# Patient Record
Sex: FEMALE | Race: WHITE | NOT HISPANIC OR LATINO | Employment: OTHER | URBAN - METROPOLITAN AREA
[De-identification: names, ages, dates, MRNs, and addresses within clinical notes are randomized per-mention and may not be internally consistent; named-entity substitution may affect disease eponyms.]

---

## 2017-01-23 DIAGNOSIS — Z12.11 ENCOUNTER FOR SCREENING FOR MALIGNANT NEOPLASM OF COLON: Primary | ICD-10-CM

## 2017-01-23 RX ORDER — SODIUM CHLORIDE 0.9 % (FLUSH) 0.9 %
1-10 SYRINGE (ML) INJECTION AS NEEDED
Status: CANCELLED | OUTPATIENT
Start: 2017-01-23

## 2017-01-23 RX ORDER — SODIUM CHLORIDE, SODIUM LACTATE, POTASSIUM CHLORIDE, CALCIUM CHLORIDE 600; 310; 30; 20 MG/100ML; MG/100ML; MG/100ML; MG/100ML
30 INJECTION, SOLUTION INTRAVENOUS CONTINUOUS
Status: CANCELLED | OUTPATIENT
Start: 2017-01-23

## 2017-02-14 DIAGNOSIS — Z12.11 ENCOUNTER FOR SCREENING FOR MALIGNANT NEOPLASM OF COLON: Primary | ICD-10-CM

## 2017-03-06 ENCOUNTER — ANESTHESIA (OUTPATIENT)
Dept: GASTROENTEROLOGY | Facility: HOSPITAL | Age: 72
End: 2017-03-06

## 2017-03-06 ENCOUNTER — ANESTHESIA EVENT (OUTPATIENT)
Dept: GASTROENTEROLOGY | Facility: HOSPITAL | Age: 72
End: 2017-03-06

## 2017-03-06 ENCOUNTER — HOSPITAL ENCOUNTER (OUTPATIENT)
Facility: HOSPITAL | Age: 72
Setting detail: HOSPITAL OUTPATIENT SURGERY
Discharge: HOME | End: 2017-03-06
Attending: INTERNAL MEDICINE | Admitting: INTERNAL MEDICINE

## 2017-03-06 VITALS
SYSTOLIC BLOOD PRESSURE: 134 MMHG | BODY MASS INDEX: 29.33 KG/M2 | OXYGEN SATURATION: 99 % | HEART RATE: 69 BPM | HEIGHT: 64 IN | DIASTOLIC BLOOD PRESSURE: 67 MMHG | TEMPERATURE: 97.8 F | RESPIRATION RATE: 15 BRPM | WEIGHT: 171.8 LBS

## 2017-03-06 DIAGNOSIS — Z12.11 ENCOUNTER FOR SCREENING FOR MALIGNANT NEOPLASM OF COLON: ICD-10-CM

## 2017-03-06 LAB — GLUCOSE BLDC GLUCOMTR-MCNC: 104 MG/DL (ref 70–130)

## 2017-03-06 PROCEDURE — 45380 COLONOSCOPY AND BIOPSY: CPT | Performed by: INTERNAL MEDICINE

## 2017-03-06 PROCEDURE — 25010000002 PROPOFOL 10 MG/ML EMULSION: Performed by: ANESTHESIOLOGY

## 2017-03-06 PROCEDURE — 45385 COLONOSCOPY W/LESION REMOVAL: CPT | Performed by: INTERNAL MEDICINE

## 2017-03-06 PROCEDURE — 82962 GLUCOSE BLOOD TEST: CPT

## 2017-03-06 PROCEDURE — 88305 TISSUE EXAM BY PATHOLOGIST: CPT | Performed by: INTERNAL MEDICINE

## 2017-03-06 RX ORDER — LIDOCAINE HYDROCHLORIDE 20 MG/ML
INJECTION, SOLUTION INFILTRATION; PERINEURAL AS NEEDED
Status: DISCONTINUED | OUTPATIENT
Start: 2017-03-06 | End: 2017-03-06 | Stop reason: SURG

## 2017-03-06 RX ORDER — ALBUTEROL SULFATE 90 UG/1
2 AEROSOL, METERED RESPIRATORY (INHALATION) EVERY 4 HOURS PRN
COMMUNITY
End: 2020-10-12

## 2017-03-06 RX ORDER — MONTELUKAST SODIUM 10 MG/1
10 TABLET ORAL NIGHTLY
COMMUNITY

## 2017-03-06 RX ORDER — GLIPIZIDE 5 MG/1
5 TABLET, FILM COATED, EXTENDED RELEASE ORAL DAILY
COMMUNITY

## 2017-03-06 RX ORDER — PROPOFOL 10 MG/ML
VIAL (ML) INTRAVENOUS AS NEEDED
Status: DISCONTINUED | OUTPATIENT
Start: 2017-03-06 | End: 2017-03-06 | Stop reason: SURG

## 2017-03-06 RX ORDER — PROPOFOL 10 MG/ML
VIAL (ML) INTRAVENOUS CONTINUOUS PRN
Status: DISCONTINUED | OUTPATIENT
Start: 2017-03-06 | End: 2017-03-06 | Stop reason: SURG

## 2017-03-06 RX ORDER — PROPOFOL 10 MG/ML
VIAL (ML) INTRAVENOUS CONTINUOUS PRN
Status: DISCONTINUED | OUTPATIENT
Start: 2017-03-06 | End: 2017-03-06

## 2017-03-06 RX ORDER — SODIUM CHLORIDE, SODIUM LACTATE, POTASSIUM CHLORIDE, CALCIUM CHLORIDE 600; 310; 30; 20 MG/100ML; MG/100ML; MG/100ML; MG/100ML
30 INJECTION, SOLUTION INTRAVENOUS CONTINUOUS PRN
Status: DISCONTINUED | OUTPATIENT
Start: 2017-03-06 | End: 2017-03-06 | Stop reason: HOSPADM

## 2017-03-06 RX ORDER — SIMVASTATIN 10 MG
10 TABLET ORAL NIGHTLY
COMMUNITY

## 2017-03-06 RX ORDER — SODIUM CHLORIDE 0.9 % (FLUSH) 0.9 %
1-10 SYRINGE (ML) INJECTION AS NEEDED
Status: DISCONTINUED | OUTPATIENT
Start: 2017-03-06 | End: 2017-03-06 | Stop reason: HOSPADM

## 2017-03-06 RX ADMIN — SODIUM CHLORIDE, POTASSIUM CHLORIDE, SODIUM LACTATE AND CALCIUM CHLORIDE 30 ML/HR: 600; 310; 30; 20 INJECTION, SOLUTION INTRAVENOUS at 09:13

## 2017-03-06 RX ADMIN — LIDOCAINE HYDROCHLORIDE 50 MG: 20 INJECTION, SOLUTION INFILTRATION; PERINEURAL at 09:39

## 2017-03-06 RX ADMIN — PROPOFOL 200 MCG/KG/MIN: 10 INJECTION, EMULSION INTRAVENOUS at 09:39

## 2017-03-06 RX ADMIN — PROPOFOL 100 MG: 10 INJECTION, EMULSION INTRAVENOUS at 09:39

## 2017-03-06 NOTE — PLAN OF CARE
Problem: Patient Care Overview (Adult)  Goal: Plan of Care Review  Outcome: Ongoing (interventions implemented as appropriate)    03/06/17 0842   Coping/Psychosocial Response Interventions   Plan Of Care Reviewed With patient       Goal: Adult Individualization and Mutuality  Outcome: Ongoing (interventions implemented as appropriate)    03/06/17 0842   Individualization   Patient Specific Preferences Olinda       Goal: Discharge Needs Assessment  Outcome: Ongoing (interventions implemented as appropriate)    03/06/17 0842   Discharge Needs Assessment   Concerns To Be Addressed no discharge needs identified   Discharge Disposition home or self-care   Living Environment   Transportation Available car;family or friend will provide         Problem: GI Endoscopy (Adult)  Intervention: Monitor/Manage Procedure Recovery    03/06/17 0842   Respiratory Interventions   Airway/Ventilation Management airway patency maintained   Coping/Psychosocial Interventions   Environmental Support calm environment promoted   Activity   Activity Type activity adjusted per tolerance   Cardiac Interventions   Warming Thermoregulation Maintenance warm blankets applied       Intervention: Prevent Tika-procedural Injury    03/06/17 0842   Positioning   Positioning side lying, left   Head Of Bed (HOB) Position HOB elevated         Goal: Signs and Symptoms of Listed Potential Problems Will be Absent or Manageable (GI Endoscopy)  Outcome: Ongoing (interventions implemented as appropriate)    03/06/17 0842   GI Endoscopy   Problems Assessed (GI Endoscopy) all

## 2017-03-06 NOTE — BRIEF OP NOTE
COLONOSCOPY  Procedure Note    Olinda Baumann  3/6/2017    Pre-op Diagnosis:   Encounter for screening for malignant neoplasm of colon [Z12.11]    Post-op Diagnosis:     Post-Op Diagnosis Codes:     * Encounter for screening for malignant neoplasm of colon [Z12.11]     * Colon polyps [K63.5]     * Internal hemorrhoids [K64.8]    Procedure/CPT® Codes:      Procedure(s):  COLONOSCOPY into cecum/terminal ileum with hot polypectomy    Surgeon(s):  Marquez Alan MD    Anesthesia: Monitor Anesthesia Care    Staff:   Endo Technician: Bijal Kim  Endo Nurse: Marycruz Marquez RN    Estimated Blood Loss: * No values recorded between 3/6/2017  9:31 AM and 3/6/2017 10:06 AM *  Urine Voided: * No values recorded between 3/6/2017  9:31 AM and 3/6/2017 10:06 AM *    Specimens:                  ID Type Source Tests Collected by Time Destination   A :  Polyp Large Intestine, Right / Ascending Colon TISSUE EXAM Marquez Alan MD 3/6/2017 0951    B : x2 Polyp Large Intestine, Transverse Colon TISSUE EXAM Marquez Alan MD 3/6/2017 0953    C : x2 Polyp Large Intestine, Left / Descending Colon TISSUE EXAM Marquez Alan MD 3/6/2017 0958    D :  Polyp Large Intestine, Sigmoid Colon TISSUE EXAM Marquez Alan MD 3/6/2017 1001    E :  Polyp Large Intestine, Rectum TISSUE EXAM Marquez Alan MD 3/6/2017 1003          Drains:    NA        Findings: Colon polyps  Internal hemorrhoids    Complications: None      Marquez Alan MD     Date: 3/6/2017  Time: 10:07 AM

## 2017-03-06 NOTE — ANESTHESIA PREPROCEDURE EVALUATION
Anesthesia Evaluation     Patient summary reviewed and Nursing notes reviewed      Airway   Mallampati: II  TM distance: <3 FB  Neck ROM: full  no difficulty expected  Dental - normal exam     Pulmonary - negative pulmonary ROS and normal exam   Cardiovascular - negative cardio ROS and normal exam        Neuro/Psych- negative ROS  GI/Hepatic/Renal/Endo - negative ROS     Musculoskeletal (-) negative ROS    Abdominal  - normal exam    Bowel sounds: normal.   Substance History - negative use     OB/GYN negative ob/gyn ROS         Other                                    Anesthesia Plan    ASA 2     MAC     intravenous induction   Anesthetic plan and risks discussed with patient.

## 2017-03-06 NOTE — H&P
"[facility], [facility]  Pre Procedure History & Physical    Chief Complaint:   Screening colonoscopy    Subjective     HPI:   Patient 72-year-old female with history of diabetes goiter and asthma presents for screening.  Patient reports had episode of prolonged diarrhea during the summer now resolved.  Patient also reports questionable anemia on last office visit and recommended by primary physician to get her screening colonoscopy done.  Patient now here for colonoscopy.    Past Medical History:   Past Medical History   Diagnosis Date   • Ankle fracture    • Asthma    • Cancer of skin of right leg    • Diabetes mellitus    • Goiter      INWARD GOITER       Family History:  History reviewed. No pertinent family history.    Social History:   reports that she has never smoked. She has never used smokeless tobacco. She reports that she drinks alcohol. She reports that she does not use illicit drugs.    Medications:   Prescriptions Prior to Admission   Medication Sig Dispense Refill Last Dose   • glipiZIDE (GLUCOTROL) 5 MG ER tablet Take 5 mg by mouth Daily.   3/5/2017 at Unknown time   • metFORMIN (GLUCOPHAGE) 500 MG tablet Take 500 mg by mouth 2 (Two) Times a Day With Meals.   3/5/2017 at Unknown time   • mometasone-formoterol (DULERA 100) 100-5 MCG/ACT inhaler Inhale 2 puffs 2 (Two) Times a Day.   3/6/2017 at Unknown time   • montelukast (SINGULAIR) 10 MG tablet Take 10 mg by mouth Every Night.   Past Week at Unknown time   • simvastatin (ZOCOR) 10 MG tablet Take 10 mg by mouth Every Night.   Past Week at Unknown time   • albuterol (VENTOLIN HFA) 108 (90 BASE) MCG/ACT inhaler Inhale 2 puffs Every 4 (Four) Hours As Needed for wheezing.   More than a month at Unknown time       Allergies:  Sulfa antibiotics    ROS:    Pertinent items are noted in HPI     Objective     Blood pressure 149/73, pulse 97, temperature 97.8 °F (36.6 °C), temperature source Oral, resp. rate 16, height 64\" (162.6 cm), weight 171 lb " 12.8 oz (77.9 kg), SpO2 98 %.    Physical Exam   Constitutional: Pt is oriented to person, place, and time and well-developed, well-nourished, and in no distress.   HENT:   Mouth/Throat: Oropharynx is clear and moist.   Neck: Normal range of motion. Neck supple.   Cardiovascular: Normal rate, regular rhythm and normal heart sounds.    Pulmonary/Chest: Effort normal and breath sounds normal. No respiratory distress. No  wheezes.   Abdominal: Soft. Bowel sounds are normal.   Skin: Skin is warm and dry.   Psychiatric: Mood, memory, affect and judgment normal.     Assessment/Plan     Diagnosis:  Screening colonoscopy    Anticipated Surgical Procedure:  Colonoscopy    The risks, benefits, and alternatives of this procedure have been discussed with the patient or the responsible party- the patient understands and agrees to proceed.

## 2017-03-06 NOTE — ANESTHESIA POSTPROCEDURE EVALUATION
Patient: Olinda Baumann    Procedure Summary     Date Anesthesia Start Anesthesia Stop Room / Location    03/06/17 0933 1005  [facility]       Procedure Diagnosis Surgeon Provider    COLONOSCOPY into cecum/terminal ileum with hot polypectomy (N/A ) Encounter for screening for malignant neoplasm of colon; Colon polyps; Internal hemorrhoids  (Encounter for screening for malignant neoplasm of colon [Z12.11]) MD Dustin Castro MD          Anesthesia Type: MAC  Last vitals  BP      Temp      Pulse     Resp      SpO2        Post Anesthesia Care and Evaluation    Patient location during evaluation: bedside  Patient participation: complete - patient participated  Level of consciousness: awake  Pain score: 1  Pain management: adequate  Airway patency: patent  Anesthetic complications: No anesthetic complications    Cardiovascular status: acceptable  Respiratory status: acceptable  Hydration status: acceptable

## 2017-03-06 NOTE — DISCHARGE INSTRUCTIONS
For the next 24 hours patient needs to be with a responsible adult.    For 24 hours DO NOT drive, operate machinery, appliances, drink alcohol, make important decisions or sign legal documents.    Start with a light or bland diet and advance to regular diet as tolerated.    Follow recommendations provided by your doctor.    Call Dr. Alan for problems [phone]    Problems may include but not limited to: large amounts of bleeding, trouble breathing, repeated vomiting, severe unrelieved pain, fever or chills.

## 2017-03-07 LAB
CYTO UR: NORMAL
LAB AP CASE REPORT: NORMAL
Lab: NORMAL
PATH REPORT.FINAL DX SPEC: NORMAL
PATH REPORT.GROSS SPEC: NORMAL

## 2017-05-03 ENCOUNTER — HOSPITAL ENCOUNTER (OUTPATIENT)
Dept: MAMMOGRAPHY | Facility: HOSPITAL | Age: 72
Discharge: HOME | End: 2017-05-03
Attending: INTERNAL MEDICINE | Admitting: INTERNAL MEDICINE

## 2017-05-03 DIAGNOSIS — Z12.31 ENCOUNTER FOR MAMMOGRAM TO ESTABLISH BASELINE MAMMOGRAM: ICD-10-CM

## 2017-05-03 PROCEDURE — G0202 SCR MAMMO BI INCL CAD: HCPCS

## 2017-11-09 ENCOUNTER — TELEPHONE (OUTPATIENT)
Dept: GASTROENTEROLOGY | Facility: CLINIC | Age: 72
End: 2017-11-09

## 2017-11-09 NOTE — TELEPHONE ENCOUNTER
----- Message from Marquez Alan MD sent at 11/8/2017 12:40 PM EST -----  Just confirm next colonoscopy in 3 years due to number of benign polyps, then will be every 5 years

## 2017-11-21 ENCOUNTER — TRANSCRIBE ORDERS (OUTPATIENT)
Dept: ADMINISTRATIVE | Facility: HOSPITAL | Age: 72
End: 2017-11-21

## 2017-11-21 ENCOUNTER — TRANSCRIBE ORDERS (OUTPATIENT)
Dept: PULMONOLOGY | Facility: HOSPITAL | Age: 72
End: 2017-11-21

## 2017-11-21 DIAGNOSIS — R05.3 PERSISTENT COUGH: Primary | ICD-10-CM

## 2017-11-27 ENCOUNTER — HOSPITAL ENCOUNTER (OUTPATIENT)
Dept: CT IMAGING | Facility: HOSPITAL | Age: 72
Discharge: HOME | End: 2017-11-27
Attending: INTERNAL MEDICINE | Admitting: INTERNAL MEDICINE

## 2017-11-27 DIAGNOSIS — R05.3 PERSISTENT COUGH: ICD-10-CM

## 2017-11-27 PROCEDURE — 71250 CT THORAX DX C-: CPT

## 2017-12-04 ENCOUNTER — TRANSCRIBE ORDERS (OUTPATIENT)
Dept: ADMINISTRATIVE | Facility: HOSPITAL | Age: 72
End: 2017-12-04

## 2017-12-04 DIAGNOSIS — R91.8 PULMONARY NODULES: Primary | ICD-10-CM

## 2018-04-24 ENCOUNTER — HOSPITAL ENCOUNTER (OUTPATIENT)
Dept: CT IMAGING | Facility: HOSPITAL | Age: 73
Discharge: HOME | End: 2018-04-24
Attending: INTERNAL MEDICINE | Admitting: INTERNAL MEDICINE

## 2018-04-24 DIAGNOSIS — R91.8 PULMONARY NODULES: ICD-10-CM

## 2018-04-24 PROCEDURE — 71250 CT THORAX DX C-: CPT

## 2018-10-23 ENCOUNTER — TRANSCRIBE ORDERS (OUTPATIENT)
Dept: ADMINISTRATIVE | Facility: HOSPITAL | Age: 73
End: 2018-10-23

## 2018-10-23 DIAGNOSIS — R91.1 COIN LESION: Primary | ICD-10-CM

## 2018-10-26 ENCOUNTER — HOSPITAL ENCOUNTER (OUTPATIENT)
Dept: CT IMAGING | Facility: HOSPITAL | Age: 73
Discharge: HOME | End: 2018-10-26
Attending: INTERNAL MEDICINE | Admitting: INTERNAL MEDICINE

## 2018-10-26 DIAGNOSIS — R91.1 COIN LESION: ICD-10-CM

## 2018-10-26 PROCEDURE — 71250 CT THORAX DX C-: CPT

## 2018-11-27 ENCOUNTER — TRANSCRIBE ORDERS (OUTPATIENT)
Dept: ADMINISTRATIVE | Facility: HOSPITAL | Age: 73
End: 2018-11-27

## 2018-11-27 DIAGNOSIS — R91.1 LUNG NODULE: Primary | ICD-10-CM

## 2019-02-14 ENCOUNTER — OFFICE VISIT (OUTPATIENT)
Dept: GASTROENTEROLOGY | Facility: CLINIC | Age: 74
End: 2019-02-14

## 2019-02-14 VITALS
HEIGHT: 64 IN | WEIGHT: 166.6 LBS | DIASTOLIC BLOOD PRESSURE: 76 MMHG | BODY MASS INDEX: 28.44 KG/M2 | TEMPERATURE: 98.7 F | SYSTOLIC BLOOD PRESSURE: 124 MMHG

## 2019-02-14 DIAGNOSIS — K52.9 CHRONIC DIARRHEA: Primary | ICD-10-CM

## 2019-02-14 PROCEDURE — 99213 OFFICE O/P EST LOW 20 MIN: CPT | Performed by: INTERNAL MEDICINE

## 2019-02-14 RX ORDER — ASPIRIN 81 MG/1
81 TABLET ORAL DAILY
COMMUNITY

## 2019-02-14 RX ORDER — NORTRIPTYLINE HYDROCHLORIDE 10 MG/1
10 CAPSULE ORAL NIGHTLY
Qty: 30 CAPSULE | Refills: 11 | Status: SHIPPED | OUTPATIENT
Start: 2019-02-14 | End: 2020-02-25 | Stop reason: SDUPTHER

## 2019-02-14 RX ORDER — CETIRIZINE HYDROCHLORIDE 10 MG/1
10 TABLET ORAL DAILY
COMMUNITY
End: 2020-10-12

## 2019-02-14 NOTE — PROGRESS NOTES
Chief Complaint   Patient presents with   • Pre-op Exam     due for colonoscopy?       Olinda Baumann is a  73 y.o. female here for a follow up visit for chronic diarrhea.    HPI     Patient 73-year-old female with history of diabetes and multiple colon polyps here for evaluation.  Patient reports chronic diarrhea with urgency.  Diarrhea can happen almost every meal and has had to severely limit fiber intake due to the fact that as soon as she begins eating solid she needs to run to the bathroom.  Patient having issues with going out for any meal.  Patient last seen 2017 for normal screening did not discussed the symptoms mostly she reports is been going on for a number of years.  Patient denies weight loss no bright red blood per rectum or melena.  Patient referred for repeat colonoscopy to evaluate chronic diarrhea.    Past Medical History:   Diagnosis Date   • Ankle fracture    • Asthma    • Cancer of skin of right leg    • Diabetes mellitus (CMS/HCC)    • Fibrocystic breast    • Goiter     INWARD GOITER         Current Outpatient Medications:   •  albuterol (VENTOLIN HFA) 108 (90 BASE) MCG/ACT inhaler, Inhale 2 puffs Every 4 (Four) Hours As Needed for wheezing., Disp: , Rfl:   •  aspirin 81 MG EC tablet, Take 81 mg by mouth Daily., Disp: , Rfl:   •  cetirizine (zyrTEC) 10 MG tablet, Take 10 mg by mouth Daily., Disp: , Rfl:   •  glipiZIDE (GLUCOTROL) 5 MG ER tablet, Take 5 mg by mouth Daily., Disp: , Rfl:   •  metFORMIN (GLUCOPHAGE) 500 MG tablet, Take 500 mg by mouth 2 (Two) Times a Day With Meals., Disp: , Rfl:   •  mometasone-formoterol (DULERA 100) 100-5 MCG/ACT inhaler, Inhale 2 puffs 2 (Two) Times a Day., Disp: , Rfl:   •  montelukast (SINGULAIR) 10 MG tablet, Take 10 mg by mouth Every Night., Disp: , Rfl:   •  simvastatin (ZOCOR) 10 MG tablet, Take 10 mg by mouth Every Night., Disp: , Rfl:   •  nortriptyline (PAMELOR) 10 MG capsule, Take 1 capsule by mouth Every Night., Disp: 30 capsule, Rfl:  11    Allergies   Allergen Reactions   • Sulfa Antibiotics      Does not remember reaction       Social History     Socioeconomic History   • Marital status:      Spouse name: Not on file   • Number of children: Not on file   • Years of education: Not on file   • Highest education level: Not on file   Social Needs   • Financial resource strain: Not on file   • Food insecurity - worry: Not on file   • Food insecurity - inability: Not on file   • Transportation needs - medical: Not on file   • Transportation needs - non-medical: Not on file   Occupational History   • Not on file   Tobacco Use   • Smoking status: Never Smoker   • Smokeless tobacco: Never Used   Substance and Sexual Activity   • Alcohol use: Yes     Comment: RARELY   • Drug use: No   • Sexual activity: Defer   Other Topics Concern   • Not on file   Social History Narrative   • Not on file       Family History   Problem Relation Age of Onset   • GI problems Mother         ileostomy       Review of Systems   Constitutional: Negative.    Respiratory: Negative.    Cardiovascular: Negative.    Gastrointestinal: Positive for abdominal distention and diarrhea. Negative for abdominal pain, anal bleeding, blood in stool, constipation, nausea, rectal pain and vomiting.   Musculoskeletal: Negative.    Skin: Negative.    Hematological: Negative.        Vitals:    02/14/19 1432   BP: 124/76   Temp: 98.7 °F (37.1 °C)       Physical Exam   Constitutional: She is oriented to person, place, and time. She appears well-developed and well-nourished.   HENT:   Head: Normocephalic and atraumatic.   Eyes: Pupils are equal, round, and reactive to light. No scleral icterus.   Cardiovascular: Normal rate, regular rhythm and normal heart sounds.   Pulmonary/Chest: Effort normal and breath sounds normal.   Abdominal: Soft. Bowel sounds are normal. She exhibits no distension and no mass. There is no tenderness. No hernia.   Neurological: She is alert and oriented to person,  place, and time.   Skin: Skin is warm and dry. No rash noted.   Psychiatric: She has a normal mood and affect. Her behavior is normal.   Vitals reviewed.      No visits with results within 2 Month(s) from this visit.   Latest known visit with results is:   Admission on 03/06/2017, Discharged on 03/06/2017   Component Date Value Ref Range Status   • Glucose 03/06/2017 104  70 - 130 mg/dL Final   • Case Report 03/06/2017    Final                    Value:Surgical Pathology Report                         Case: [id number]                                  Authorizing Provider:  Marquez Alan MD     Collected:           03/06/2017 09:51 AM          Ordering Location:     [facility]  Received:            03/06/2017 11:09 AM                                 ENDO SUITES                                                                  Pathologist:           Lele Peters MD                                                                           Specimens:   1) - Large Intestine, Right / Ascending Colon                                                       2) - Large Intestine, Transverse Colon, x2                                                          3) - Large Intestine, Left / Descending Colon, x2                                                   4) - Large Intestine, Sigmoid Colon                                                                                           5) - Large Intestine, Rectum                                                              • Final Diagnosis 03/06/2017    Final                    Value:This result contains rich text formatting which cannot be displayed here.   • Gross Description 03/06/2017    Final                    Value:This result contains rich text formatting which cannot be displayed here.   • Microscopic Description 03/06/2017    Final                    Value:This result  contains rich text formatting which cannot be displayed here.       Olinda was seen today for pre-op exam.    Diagnoses and all orders for this visit:    Chronic diarrhea  -     Case Request; Standing  -     Case Request    Other orders  -     Follow Anesthesia Guidelines / Standing Orders; Future  -     Implement Anesthesia orders day of procedure.; Standing  -     Obtain informed consent; Standing  -     nortriptyline (PAMELOR) 10 MG capsule; Take 1 capsule by mouth Every Night.      Patient 73-year-old female last seen 2017 for colonoscopy screening found with multiple colon adenomas.  Patient reports for several years chronic diarrhea.  Patient reports difficulty traveling due to the diarrhea which occurs almost after any meal.  Patient with urgency but no bright red blood per rectum.  Patient had to adjust down her metformin however this still did not resolve her symptoms.  At this point would repeat colonoscopy for biopsies to rule out microscopic colitis but will treat for IBS pending the results.  We will begin Pamelor 10 mg at bedtime and follow-up clinically.

## 2019-02-27 ENCOUNTER — ANESTHESIA (OUTPATIENT)
Dept: GASTROENTEROLOGY | Facility: HOSPITAL | Age: 74
End: 2019-02-27

## 2019-02-27 ENCOUNTER — ANESTHESIA EVENT (OUTPATIENT)
Dept: GASTROENTEROLOGY | Facility: HOSPITAL | Age: 74
End: 2019-02-27

## 2019-02-27 ENCOUNTER — HOSPITAL ENCOUNTER (OUTPATIENT)
Facility: HOSPITAL | Age: 74
Setting detail: HOSPITAL OUTPATIENT SURGERY
Discharge: HOME | End: 2019-02-27
Attending: INTERNAL MEDICINE | Admitting: INTERNAL MEDICINE

## 2019-02-27 VITALS
TEMPERATURE: 98.6 F | OXYGEN SATURATION: 100 % | BODY MASS INDEX: 30.27 KG/M2 | WEIGHT: 164.5 LBS | SYSTOLIC BLOOD PRESSURE: 123 MMHG | RESPIRATION RATE: 18 BRPM | HEIGHT: 62 IN | DIASTOLIC BLOOD PRESSURE: 80 MMHG | HEART RATE: 69 BPM

## 2019-02-27 DIAGNOSIS — K52.9 CHRONIC DIARRHEA: ICD-10-CM

## 2019-02-27 LAB — GLUCOSE BLDC GLUCOMTR-MCNC: 113 MG/DL (ref 70–130)

## 2019-02-27 PROCEDURE — 25010000002 PROPOFOL 10 MG/ML EMULSION: Performed by: ANESTHESIOLOGY

## 2019-02-27 PROCEDURE — 82962 GLUCOSE BLOOD TEST: CPT

## 2019-02-27 PROCEDURE — 45380 COLONOSCOPY AND BIOPSY: CPT | Performed by: INTERNAL MEDICINE

## 2019-02-27 PROCEDURE — 88305 TISSUE EXAM BY PATHOLOGIST: CPT | Performed by: INTERNAL MEDICINE

## 2019-02-27 RX ORDER — SODIUM CHLORIDE, SODIUM LACTATE, POTASSIUM CHLORIDE, CALCIUM CHLORIDE 600; 310; 30; 20 MG/100ML; MG/100ML; MG/100ML; MG/100ML
30 INJECTION, SOLUTION INTRAVENOUS CONTINUOUS PRN
Status: DISCONTINUED | OUTPATIENT
Start: 2019-02-27 | End: 2019-02-27 | Stop reason: HOSPADM

## 2019-02-27 RX ORDER — BUDESONIDE AND FORMOTEROL FUMARATE DIHYDRATE 160; 4.5 UG/1; UG/1
2 AEROSOL RESPIRATORY (INHALATION) 2 TIMES DAILY
COMMUNITY

## 2019-02-27 RX ORDER — LIDOCAINE HYDROCHLORIDE 20 MG/ML
INJECTION, SOLUTION INFILTRATION; PERINEURAL AS NEEDED
Status: DISCONTINUED | OUTPATIENT
Start: 2019-02-27 | End: 2019-02-27 | Stop reason: SURG

## 2019-02-27 RX ORDER — PROPOFOL 10 MG/ML
VIAL (ML) INTRAVENOUS AS NEEDED
Status: DISCONTINUED | OUTPATIENT
Start: 2019-02-27 | End: 2019-02-27 | Stop reason: SURG

## 2019-02-27 RX ORDER — SODIUM CHLORIDE 0.9 % (FLUSH) 0.9 %
3-10 SYRINGE (ML) INJECTION AS NEEDED
Status: DISCONTINUED | OUTPATIENT
Start: 2019-02-27 | End: 2019-02-27 | Stop reason: HOSPADM

## 2019-02-27 RX ORDER — SODIUM CHLORIDE 0.9 % (FLUSH) 0.9 %
3 SYRINGE (ML) INJECTION EVERY 12 HOURS SCHEDULED
Status: DISCONTINUED | OUTPATIENT
Start: 2019-02-27 | End: 2019-02-27 | Stop reason: HOSPADM

## 2019-02-27 RX ORDER — PROPOFOL 10 MG/ML
VIAL (ML) INTRAVENOUS CONTINUOUS PRN
Status: DISCONTINUED | OUTPATIENT
Start: 2019-02-27 | End: 2019-02-27 | Stop reason: SURG

## 2019-02-27 RX ADMIN — SODIUM CHLORIDE, POTASSIUM CHLORIDE, SODIUM LACTATE AND CALCIUM CHLORIDE 30 ML/HR: 600; 310; 30; 20 INJECTION, SOLUTION INTRAVENOUS at 11:18

## 2019-02-27 RX ADMIN — PROPOFOL 50 MG: 10 INJECTION, EMULSION INTRAVENOUS at 12:40

## 2019-02-27 RX ADMIN — LIDOCAINE HYDROCHLORIDE 60 MG: 20 INJECTION, SOLUTION INFILTRATION; PERINEURAL at 12:40

## 2019-02-27 RX ADMIN — PROPOFOL 200 MCG/KG/MIN: 10 INJECTION, EMULSION INTRAVENOUS at 12:40

## 2019-02-27 NOTE — ANESTHESIA POSTPROCEDURE EVALUATION
Patient: Olinda Baumann    Procedure Summary     Date:  02/27/19 Room / Location:   [facility]    Anesthesia Start:  1236 Anesthesia Stop:  1300    Procedure:  COLONOSCOPY TO CECUM WITH COLD BIOPSIES (N/A ) Diagnosis:       Chronic diarrhea      Internal hemorrhoids without complication      (Chronic diarrhea [K52.9])    Surgeon:  Marquez Alan MD Provider:  Anibal Kaufman MD    Anesthesia Type:  MAC ASA Status:  3          Anesthesia Type: MAC  Last vitals  BP   123/80 (02/27/19 1320)   Temp   37 °C (98.6 °F) (02/27/19 1044)   Pulse   69 (02/27/19 1320)   Resp   18 (02/27/19 1320)     SpO2   100 % (02/27/19 1320)     Post Anesthesia Care and Evaluation    Patient location during evaluation: bedside  Patient participation: complete - patient participated  Level of consciousness: awake and alert  Pain score: 0  Pain management: adequate  Airway patency: patent  Anesthetic complications: No anesthetic complications  PONV Status: none  Cardiovascular status: acceptable  Respiratory status: acceptable  Hydration status: acceptable  Post Neuraxial Block status: Motor and sensory function returned to baseline

## 2019-02-27 NOTE — ANESTHESIA PREPROCEDURE EVALUATION
Anesthesia Evaluation     Patient summary reviewed                Airway   Mallampati: III  TM distance: >3 FB  Neck ROM: full  No difficulty expected  Dental    (+) upper dentures    Pulmonary     breath sounds clear to auscultation  Cardiovascular   Exercise tolerance: good (4-7 METS)    Rhythm: regular  Rate: normal        Neuro/Psych  GI/Hepatic/Renal/Endo      Musculoskeletal     Abdominal    Substance History      OB/GYN          Other                        Anesthesia Plan    ASA 3     MAC     intravenous induction   Anesthetic plan, all risks, benefits, and alternatives have been provided, discussed and informed consent has been obtained with: patient.

## 2019-02-28 LAB
CYTO UR: NORMAL
LAB AP CASE REPORT: NORMAL
PATH REPORT.FINAL DX SPEC: NORMAL
PATH REPORT.GROSS SPEC: NORMAL

## 2019-03-11 ENCOUNTER — TELEPHONE (OUTPATIENT)
Dept: GASTROENTEROLOGY | Facility: CLINIC | Age: 74
End: 2019-03-11

## 2019-03-15 NOTE — TELEPHONE ENCOUNTER
Pt returned call per staff message.    Called pt back. Advised of the note from Dr Alan. Pt verb understanding and stated the nortriptyline is working well for her.

## 2019-03-19 NOTE — TELEPHONE ENCOUNTER
----- Message from Marquez Alan MD sent at 3/8/2019  2:30 PM EST -----  Biopsies benign with no inflammation noted, no microscopic colitis.  Check with patient to see if the Pamelor has helped.   How Severe Are They?: moderate Is This A New Presentation, Or A Follow-Up?: Skin Lesions

## 2019-04-26 RX ORDER — NORTRIPTYLINE HYDROCHLORIDE 10 MG/1
10 CAPSULE ORAL NIGHTLY
Qty: 90 CAPSULE | Refills: 1 | Status: SHIPPED | OUTPATIENT
Start: 2019-04-26 | End: 2020-02-25 | Stop reason: SDUPTHER

## 2019-11-26 ENCOUNTER — HOSPITAL ENCOUNTER (OUTPATIENT)
Dept: CT IMAGING | Facility: HOSPITAL | Age: 74
Discharge: HOME | End: 2019-11-26
Admitting: INTERNAL MEDICINE

## 2019-11-26 ENCOUNTER — HOSPITAL ENCOUNTER (OUTPATIENT)
Dept: MAMMOGRAPHY | Facility: HOSPITAL | Age: 74
Discharge: HOME | End: 2019-11-26

## 2019-11-26 ENCOUNTER — TRANSCRIBE ORDERS (OUTPATIENT)
Dept: ADMINISTRATIVE | Facility: HOSPITAL | Age: 74
End: 2019-11-26

## 2019-11-26 DIAGNOSIS — Z12.39 BREAST SCREENING: Primary | ICD-10-CM

## 2019-11-26 DIAGNOSIS — Z12.39 BREAST SCREENING: ICD-10-CM

## 2019-11-26 DIAGNOSIS — R91.1 LUNG NODULE: ICD-10-CM

## 2019-11-26 PROCEDURE — 71250 CT THORAX DX C-: CPT

## 2019-11-26 PROCEDURE — 77067 SCR MAMMO BI INCL CAD: CPT

## 2019-11-26 PROCEDURE — 77063 BREAST TOMOSYNTHESIS BI: CPT

## 2019-12-03 ENCOUNTER — TRANSCRIBE ORDERS (OUTPATIENT)
Dept: ADMINISTRATIVE | Facility: HOSPITAL | Age: 74
End: 2019-12-03

## 2019-12-03 DIAGNOSIS — K76.9 LIVER LESION: Primary | ICD-10-CM

## 2019-12-10 ENCOUNTER — HOSPITAL ENCOUNTER (OUTPATIENT)
Dept: CT IMAGING | Facility: HOSPITAL | Age: 74
Discharge: HOME | End: 2019-12-10
Admitting: INTERNAL MEDICINE

## 2019-12-10 DIAGNOSIS — K76.9 LIVER LESION: ICD-10-CM

## 2019-12-10 LAB — CREAT BLDA-MCNC: 0.6 MG/DL (ref 0.6–1.3)

## 2019-12-10 PROCEDURE — 74178 CT ABD&PLV WO CNTR FLWD CNTR: CPT

## 2019-12-10 PROCEDURE — 25010000002 IOPAMIDOL 61 % SOLUTION: Performed by: INTERNAL MEDICINE

## 2019-12-10 PROCEDURE — 82565 ASSAY OF CREATININE: CPT

## 2019-12-10 RX ADMIN — IOPAMIDOL 85 ML: 612 INJECTION, SOLUTION INTRAVENOUS at 09:44

## 2020-01-02 ENCOUNTER — TRANSCRIBE ORDERS (OUTPATIENT)
Dept: ADMINISTRATIVE | Facility: HOSPITAL | Age: 75
End: 2020-01-02

## 2020-01-02 DIAGNOSIS — R91.8 PULMONARY NODULES: Primary | ICD-10-CM

## 2020-01-27 ENCOUNTER — TELEPHONE (OUTPATIENT)
Dept: GASTROENTEROLOGY | Facility: CLINIC | Age: 75
End: 2020-01-27

## 2020-01-27 NOTE — TELEPHONE ENCOUNTER
Returned patient's phone call. Patient states Dr. Temo Zaidi ordered a CT scan for her and it showed she has liver lesions. She states Dr. Zaidi wants Dr. Alan to review the results. Advised patient to have Dr. Zaidi send an update regarding her test results. She states she is seeing him in the morning and will have him send an update. Advised will send an update to Dr. Alan. She verb understanding.

## 2020-01-27 NOTE — TELEPHONE ENCOUNTER
----- Message from Kamilah Olivera sent at 1/27/2020  1:25 PM EST -----  Regarding: [employer]IC[employer]  Contact: [phone]  CT RESULTS

## 2020-02-25 ENCOUNTER — OFFICE VISIT (OUTPATIENT)
Dept: GASTROENTEROLOGY | Facility: CLINIC | Age: 75
End: 2020-02-25

## 2020-02-25 VITALS
WEIGHT: 171.6 LBS | DIASTOLIC BLOOD PRESSURE: 80 MMHG | TEMPERATURE: 97.6 F | SYSTOLIC BLOOD PRESSURE: 140 MMHG | HEIGHT: 62 IN | BODY MASS INDEX: 31.58 KG/M2

## 2020-02-25 DIAGNOSIS — K58.0 IRRITABLE BOWEL SYNDROME WITH DIARRHEA: Primary | ICD-10-CM

## 2020-02-25 DIAGNOSIS — R05.3 CHRONIC COUGH: ICD-10-CM

## 2020-02-25 PROCEDURE — 99213 OFFICE O/P EST LOW 20 MIN: CPT | Performed by: INTERNAL MEDICINE

## 2020-02-25 RX ORDER — IPRATROPIUM BROMIDE 42 UG/1
SPRAY, METERED NASAL
COMMUNITY
Start: 2020-02-06 | End: 2020-10-12

## 2020-02-25 RX ORDER — NORTRIPTYLINE HYDROCHLORIDE 10 MG/1
10 CAPSULE ORAL NIGHTLY
Qty: 90 CAPSULE | Refills: 3 | Status: SHIPPED | OUTPATIENT
Start: 2020-02-25 | End: 2022-05-03 | Stop reason: SDUPTHER

## 2020-02-25 RX ORDER — TIOTROPIUM BROMIDE INHALATION SPRAY 1.56 UG/1
2 SPRAY, METERED RESPIRATORY (INHALATION) DAILY
COMMUNITY
Start: 2020-02-04 | End: 2022-07-05

## 2020-02-25 RX ORDER — PANTOPRAZOLE SODIUM 40 MG/1
40 TABLET, DELAYED RELEASE ORAL DAILY
Qty: 90 TABLET | Refills: 3 | Status: SHIPPED | OUTPATIENT
Start: 2020-02-25 | End: 2021-02-01

## 2020-02-25 NOTE — PROGRESS NOTES
Chief Complaint   Patient presents with   • Follow-up   • Diarrhea       Olinda Baumann is a  74 y.o. female here for a follow up visit for IBS D.    HPI     Patient 74-year-old female with history of diabetes, hyperlipidemia and IBS D doing very well on as needed nortriptyline.  Patient reports is being worked up for chronic cough and while she complains of postnasal drip it has been suggested that her cough may be reflux related.  Patient denies any significant heartburn no nausea vomiting no weight loss.  Patient denies any change in diet or appetite.  Patient here for med refill for her nortriptyline.    Past Medical History:   Diagnosis Date   • Ankle fracture    • Asthma    • Breast cyst    • Cancer of skin of right leg    • Diabetes mellitus (CMS/HCC)    • Fibrocystic breast    • Goiter     INWARD GOITER   • History of colon polyps    • Hyperlipidemia    • Seasonal allergies          Current Outpatient Medications:   •  albuterol (VENTOLIN HFA) 108 (90 BASE) MCG/ACT inhaler, Inhale 2 puffs Every 4 (Four) Hours As Needed for wheezing., Disp: , Rfl:   •  aspirin 81 MG EC tablet, Take 81 mg by mouth Daily., Disp: , Rfl:   •  budesonide-formoterol (SYMBICORT) 160-4.5 MCG/ACT inhaler, Inhale 2 puffs 2 (Two) Times a Day., Disp: , Rfl:   •  glipiZIDE (GLUCOTROL) 5 MG ER tablet, Take 5 mg by mouth Daily., Disp: , Rfl:   •  Loratadine (CLARITIN PO), Take  by mouth As Needed., Disp: , Rfl:   •  metFORMIN (GLUCOPHAGE) 500 MG tablet, Take 500 mg by mouth every night at bedtime., Disp: , Rfl:   •  montelukast (SINGULAIR) 10 MG tablet, Take 10 mg by mouth Every Night., Disp: , Rfl:   •  nortriptyline (PAMELOR) 10 MG capsule, Take 1 capsule by mouth Every Night., Disp: 90 capsule, Rfl: 3  •  simvastatin (ZOCOR) 10 MG tablet, Take 10 mg by mouth Every Night., Disp: , Rfl:   •  cetirizine (zyrTEC) 10 MG tablet, Take 10 mg by mouth Daily., Disp: , Rfl:   •  ipratropium (ATROVENT) 0.06 % nasal spray, , Disp: , Rfl:   •   mometasone-formoterol (DULERA 100) 100-5 MCG/ACT inhaler, Inhale 2 puffs 2 (Two) Times a Day., Disp: , Rfl:   •  pantoprazole (PROTONIX) 40 MG EC tablet, Take 1 tablet by mouth Daily., Disp: 90 tablet, Rfl: 3  •  SPIRIVA RESPIMAT 1.25 MCG/ACT aerosol solution inhaler, , Disp: , Rfl:     Allergies   Allergen Reactions   • Sulfa Antibiotics Other (See Comments)     Does not remember reaction       Social History     Socioeconomic History   • Marital status:      Spouse name: Not on file   • Number of children: Not on file   • Years of education: Not on file   • Highest education level: Not on file   Tobacco Use   • Smoking status: Never Smoker   • Smokeless tobacco: Never Used   Substance and Sexual Activity   • Alcohol use: Never     Comment: RARELY   • Drug use: Never   • Sexual activity: Defer       Family History   Problem Relation Age of Onset   • GI problems Mother         ileostomy   • Malig Hyperthermia Neg Hx        Review of Systems   Constitutional: Negative.    Respiratory: Positive for cough. Negative for apnea, choking, chest tightness, shortness of breath and stridor.    Cardiovascular: Negative.    Gastrointestinal: Negative.    Skin: Negative.    Hematological: Negative.        Vitals:    02/25/20 0853   BP: 140/80   Temp: 97.6 °F (36.4 °C)       Physical Exam   Constitutional: She is oriented to person, place, and time. She appears well-developed and well-nourished.   HENT:   Head: Normocephalic and atraumatic.   Eyes: Pupils are equal, round, and reactive to light. No scleral icterus.   Cardiovascular: Normal rate, regular rhythm and normal heart sounds. Exam reveals no gallop and no friction rub.   No murmur heard.  Pulmonary/Chest: Effort normal and breath sounds normal.   Abdominal: Soft. Bowel sounds are normal. She exhibits no distension and no mass. There is no tenderness. No hernia.   Musculoskeletal: She exhibits no edema.   Neurological: She is alert and oriented to person, place,  and time.   Skin: Skin is warm and dry. No rash noted.   Psychiatric: She has a normal mood and affect. Her behavior is normal.   Vitals reviewed.      No visits with results within 2 Month(s) from this visit.   Latest known visit with results is:   Hospital Outpatient Visit on 12/10/2019   Component Date Value Ref Range Status   • Creatinine 12/10/2019 0.60  0.60 - 1.30 mg/dL Final       Olinda was seen today for follow-up and diarrhea.    Diagnoses and all orders for this visit:    Irritable bowel syndrome with diarrhea    Chronic cough    Other orders  -     nortriptyline (PAMELOR) 10 MG capsule; Take 1 capsule by mouth Every Night.  -     pantoprazole (PROTONIX) 40 MG EC tablet; Take 1 tablet by mouth Daily.      Patient 74-year-old female with history of chronic cough and IBS D doing very well on the nortriptyline 10 mg but actually just taking it as needed.  Patient reports having some altered sensation on top of her head she takes it every night but as needed she does very well with her bowels.  Patient undergoing work-up for chronic cough and pulmonary suggested might be related to reflux though no objective evidence for this and still undergoing evaluation.  Patient denies any heartburn or reflux type symptoms but certainly a trial of proton pump inhibitor would not be unreasonable.  We will refill her nortriptyline to take as effective.  We will also order Protonix 40 mg daily and follow-up as needed.

## 2020-06-22 ENCOUNTER — OFFICE VISIT (OUTPATIENT)
Dept: OBSTETRICS AND GYNECOLOGY | Age: 75
End: 2020-06-22

## 2020-06-22 VITALS
BODY MASS INDEX: 31.1 KG/M2 | HEIGHT: 62 IN | DIASTOLIC BLOOD PRESSURE: 84 MMHG | SYSTOLIC BLOOD PRESSURE: 144 MMHG | WEIGHT: 169 LBS

## 2020-06-22 DIAGNOSIS — R93.89 THICKENED ENDOMETRIUM: Primary | ICD-10-CM

## 2020-06-22 PROCEDURE — 99204 OFFICE O/P NEW MOD 45 MIN: CPT | Performed by: OBSTETRICS & GYNECOLOGY

## 2020-06-22 RX ORDER — LISINOPRIL 5 MG/1
5 TABLET ORAL NIGHTLY
COMMUNITY
Start: 2020-06-10 | End: 2022-06-13

## 2020-06-22 NOTE — PROGRESS NOTES
Subjective     Chief Complaint   Patient presents with   • Gynecologic Exam     New gyn: referred by  for endometrial thickening per CT scan,dated 19       History of Present Illness  Olinda Baumann is a 75 y.o. female is being seen today for evaluation of potentially thickened endometrium however on CT scan incidental endometrial measurement was only 6 mm.  Patient's not had any postmenopausal bleeding she has no other gynecological concerns or complaints.  She is a non-smoker she does not take hormone replacement therapy she is not had any postmenopausal bleeding she went through natural menopause at 51.  Chief Complaint   Patient presents with   • Gynecologic Exam     New gyn: referred by  for endometrial thickening per CT scan,dated 19   .        The following portions of the patient's history were reviewed and updated as appropriate: allergies, current medications, past family history, past medical history, past social history, past surgical history and problem list.    PAST MEDICAL HISTORY  Past Medical History:   Diagnosis Date   • Ankle fracture    • Asthma    • Breast cyst    • Cancer of skin of right leg    • Diabetes mellitus (CMS/HCC)    • Fibrocystic breast    • Goiter     INWARD GOITER   • History of colon polyps    • Hyperlipidemia    • Seasonal allergies      OB History    Para Term  AB Living       0         SAB TAB Ectopic Molar Multiple Live Births                   Past Surgical History:   Procedure Laterality Date   • ANKLE SURGERY Left     WITH METAL   • BREAST CYST EXCISION Left     about 20 years ago   • CATARACT EXTRACTION EXTRACAPSULAR W/ INTRAOCULAR LENS IMPLANTATION     • COLONOSCOPY N/A 3/6/2017    TA polyps, IH   • COLONOSCOPY N/A 2019    Procedure: COLONOSCOPY TO CECUM WITH COLD BIOPSIES;  Surgeon: Marquez Alan MD;  Location: [facility] ENDOSCOPY;  Service: Gastroenterology   • FRACTURE SURGERY Left     ANKLE   • MOUTH SURGERY      • SKIN CANCER EXCISION Right     LEG     Family History   Problem Relation Age of Onset   • GI problems Mother         ileostomy   • Malig Hyperthermia Neg Hx      Social History     Tobacco Use   Smoking Status Never Smoker   Smokeless Tobacco Never Used       Current Outpatient Medications:   •  albuterol (VENTOLIN HFA) 108 (90 BASE) MCG/ACT inhaler, Inhale 2 puffs Every 4 (Four) Hours As Needed for wheezing., Disp: , Rfl:   •  aspirin 81 MG EC tablet, Take 81 mg by mouth Daily., Disp: , Rfl:   •  budesonide-formoterol (SYMBICORT) 160-4.5 MCG/ACT inhaler, Inhale 2 puffs 2 (Two) Times a Day., Disp: , Rfl:   •  glipiZIDE (GLUCOTROL) 5 MG ER tablet, Take 5 mg by mouth Daily., Disp: , Rfl:   •  ipratropium (ATROVENT) 0.06 % nasal spray, , Disp: , Rfl:   •  lisinopril (PRINIVIL,ZESTRIL) 5 MG tablet, , Disp: , Rfl:   •  Loratadine (CLARITIN PO), Take  by mouth As Needed., Disp: , Rfl:   •  metFORMIN (GLUCOPHAGE) 500 MG tablet, Take 500 mg by mouth every night at bedtime., Disp: , Rfl:   •  montelukast (SINGULAIR) 10 MG tablet, Take 10 mg by mouth Every Night., Disp: , Rfl:   •  nortriptyline (PAMELOR) 10 MG capsule, Take 1 capsule by mouth Every Night., Disp: 90 capsule, Rfl: 3  •  pantoprazole (PROTONIX) 40 MG EC tablet, Take 1 tablet by mouth Daily., Disp: 90 tablet, Rfl: 3  •  simvastatin (ZOCOR) 10 MG tablet, Take 10 mg by mouth Every Night., Disp: , Rfl:   •  SPIRIVA RESPIMAT 1.25 MCG/ACT aerosol solution inhaler, , Disp: , Rfl:   •  cetirizine (zyrTEC) 10 MG tablet, Take 10 mg by mouth Daily., Disp: , Rfl:   •  mometasone-formoterol (DULERA 100) 100-5 MCG/ACT inhaler, Inhale 2 puffs 2 (Two) Times a Day., Disp: , Rfl:     There is no immunization history on file for this patient.    Review of Systems       Except as outlined in history of physical illness, patient denies any changes in her GYN, , GI systems. All other systems reviewed are negative.    Objective   Physical Exam   Alert and oriented, respirations  unlabored, heart regular rate and rhythm   Pelvic external genitalia normal vagina normal cervix is nulliparous uterus is nonenlarged adnexa normal rectal exams normal      Assessment/Plan   Olinda was seen today for gynecologic exam.    Diagnoses and all orders for this visit:    Thickened endometrium  -     IGP, Apt HPV,rfx 16 / 18,45  -     US Pelvis Complete; Future    Actually the endometrium is likely to be not thickened and normal but we are going to schedule a ultrasound at the [facility] location per patient's request.  If that is normal we will simply follow back up with us in 6 months time.  If it is thickened would recommend endometrial sampling.                     EMR Dragon/ Transcription disclaimer:  Much of the encounter note is an electronic transcription/translation of spoken language to printed text. The electronic translation of spoken language may permit erroneous, or at times, nonessential words or phrases to be inadvertently transcribes; Although i have reviewed the note for such errors, some may still exist.

## 2020-06-23 ENCOUNTER — TELEPHONE (OUTPATIENT)
Dept: OBSTETRICS AND GYNECOLOGY | Age: 75
End: 2020-06-23

## 2020-06-23 RX ORDER — FLUCONAZOLE 150 MG/1
150 TABLET ORAL DAILY
Qty: 3 TABLET | Refills: 0 | Status: SHIPPED | OUTPATIENT
Start: 2020-06-23 | End: 2020-06-26

## 2020-06-23 NOTE — TELEPHONE ENCOUNTER
Patient called, wanted to know if she could have an rx sent into pharmacy for yeast infection. Pharmacy confirmed

## 2020-06-25 ENCOUNTER — TELEPHONE (OUTPATIENT)
Dept: OBSTETRICS AND GYNECOLOGY | Age: 75
End: 2020-06-25

## 2020-06-25 NOTE — TELEPHONE ENCOUNTER
"Anesthesia Transfer of Care Note    Patient: Diana Arriaga    Procedure(s) Performed: Procedure(s) (LRB):  INSERTION, TISSUE EXPANDER, BREAST (Right)  CLOSURE, BREAST (Left)    Patient location: PACU    Anesthesia Type: general    Transport from OR: Transported from OR on room air with adequate spontaneous ventilation    Post pain: adequate analgesia    Post assessment: no apparent anesthetic complications and tolerated procedure well    Post vital signs: stable    Level of consciousness: awake, alert and oriented    Nausea/Vomiting: no nausea/vomiting    Complications: none    Transfer of care protocol was followed      Last vitals:   Visit Vitals  BP (!) 118/57   Pulse 83   Temp 36.6 °C (97.9 °F) (Oral)   Resp 14   Ht 5' 2" (1.575 m)   Wt 66.2 kg (146 lb)   SpO2 99%   Breastfeeding? No   BMI 26.70 kg/m²     " (Link pt) Sumi from [facility] scheduling is needing us to fix the order for the u/s Dr. Calix put in. They need it to say a Non Ob Transvaginal U/S, can we place this?    [phone]

## 2020-06-26 LAB
CYTOLOGIST CVX/VAG CYTO: NORMAL
CYTOLOGY CVX/VAG DOC CYTO: NORMAL
CYTOLOGY CVX/VAG DOC THIN PREP: NORMAL
DX ICD CODE: NORMAL
HIV 1 & 2 AB SER-IMP: NORMAL
HPV I/H RISK 4 DNA CVX QL PROBE+SIG AMP: NEGATIVE
OTHER STN SPEC: NORMAL
STAT OF ADQ CVX/VAG CYTO-IMP: NORMAL

## 2020-07-02 ENCOUNTER — HOSPITAL ENCOUNTER (OUTPATIENT)
Dept: ULTRASOUND IMAGING | Facility: HOSPITAL | Age: 75
End: 2020-07-02

## 2020-07-14 ENCOUNTER — TELEPHONE (OUTPATIENT)
Dept: OBSTETRICS AND GYNECOLOGY | Age: 75
End: 2020-07-14

## 2020-07-14 DIAGNOSIS — R10.2 PELVIC PAIN: Primary | ICD-10-CM

## 2020-07-16 ENCOUNTER — HOSPITAL ENCOUNTER (OUTPATIENT)
Dept: ULTRASOUND IMAGING | Facility: HOSPITAL | Age: 75
Discharge: HOME | End: 2020-07-16
Admitting: OBSTETRICS & GYNECOLOGY

## 2020-07-16 DIAGNOSIS — R93.89 THICKENED ENDOMETRIUM: ICD-10-CM

## 2020-07-16 DIAGNOSIS — R10.2 PELVIC PAIN: ICD-10-CM

## 2020-07-16 PROCEDURE — 76856 US EXAM PELVIC COMPLETE: CPT

## 2020-07-16 PROCEDURE — 76830 TRANSVAGINAL US NON-OB: CPT

## 2020-07-22 ENCOUNTER — TELEPHONE (OUTPATIENT)
Dept: OBSTETRICS AND GYNECOLOGY | Age: 75
End: 2020-07-22

## 2020-07-22 NOTE — TELEPHONE ENCOUNTER
----- Message from Mariano Calix MD sent at 7/22/2020  3:03 PM EDT -----  I have already sent a note to call the patient and have her scheduled next week for an endometrial biopsy.  Any day of the week works fine for me

## 2020-07-22 NOTE — TELEPHONE ENCOUNTER
Pt is aware that you are out of office until Monday 7/27/20.  She would like you to call her in regards to her U/S of the Uterus results.  She says she is leaving town on 7/30/20.

## 2020-07-22 NOTE — PROGRESS NOTES
I have already sent a note to call the patient and have her scheduled next week for an endometrial biopsy.  Any day of the week works fine for me

## 2020-07-22 NOTE — TELEPHONE ENCOUNTER
Alondra I am going to need to see the patient either at the end of Monday or Tuesday or Wednesday of next week or if there is a chance to put her on Thursday that would be fine as well.  When you call the patient please let her know the ultrasound confirmed the thickening and as we discussed we need to do an endometrial sampling or biopsy.

## 2020-07-28 ENCOUNTER — OFFICE VISIT (OUTPATIENT)
Dept: OBSTETRICS AND GYNECOLOGY | Age: 75
End: 2020-07-28

## 2020-07-28 VITALS
DIASTOLIC BLOOD PRESSURE: 86 MMHG | HEIGHT: 62 IN | WEIGHT: 166 LBS | BODY MASS INDEX: 30.55 KG/M2 | SYSTOLIC BLOOD PRESSURE: 160 MMHG

## 2020-07-28 DIAGNOSIS — R93.89 THICKENED ENDOMETRIUM: Primary | ICD-10-CM

## 2020-07-28 DIAGNOSIS — N94.89 ENDOMETRIAL MASS: ICD-10-CM

## 2020-07-28 PROCEDURE — 58100 BIOPSY OF UTERUS LINING: CPT | Performed by: OBSTETRICS & GYNECOLOGY

## 2020-07-28 NOTE — PROGRESS NOTES
Subjective     Chief Complaint   Patient presents with   • Gynecologic Exam     Problem: ? endo biopsy       History of Present Illness  Olinda Baumann is a 75 y.o. female is being seen today for endometrial biopsy  Chief Complaint   Patient presents with   • Gynecologic Exam     Problem: ? endo biopsy   .        The following portions of the patient's history were reviewed and updated as appropriate: allergies, current medications, past family history, past medical history, past social history, past surgical history and problem list.    PAST MEDICAL HISTORY  Past Medical History:   Diagnosis Date   • Ankle fracture    • Asthma    • Breast cyst    • Cancer of skin of right leg    • Diabetes mellitus (CMS/HCC)    • Fibrocystic breast    • Goiter     INWARD GOITER   • History of colon polyps    • Hyperlipidemia    • Seasonal allergies      OB History    Para Term  AB Living       0         SAB TAB Ectopic Molar Multiple Live Births                   Past Surgical History:   Procedure Laterality Date   • ANKLE SURGERY Left     WITH METAL   • BREAST CYST EXCISION Left     about 20 years ago   • CATARACT EXTRACTION EXTRACAPSULAR W/ INTRAOCULAR LENS IMPLANTATION     • COLONOSCOPY N/A 3/6/2017    TA polyps, IH   • COLONOSCOPY N/A 2019    Procedure: COLONOSCOPY TO CECUM WITH COLD BIOPSIES;  Surgeon: Marquez Alan MD;  Location: [facility] ENDOSCOPY;  Service: Gastroenterology   • FRACTURE SURGERY Left     ANKLE   • MOUTH SURGERY     • SKIN CANCER EXCISION Right     LEG     Family History   Problem Relation Age of Onset   • GI problems Mother         ileostomy   • Malig Hyperthermia Neg Hx      Social History     Tobacco Use   Smoking Status Never Smoker   Smokeless Tobacco Never Used       Current Outpatient Medications:   •  albuterol (VENTOLIN HFA) 108 (90 BASE) MCG/ACT inhaler, Inhale 2 puffs Every 4 (Four) Hours As Needed for wheezing., Disp: , Rfl:   •  aspirin 81 MG EC tablet, Take 81 mg by  mouth Daily., Disp: , Rfl:   •  budesonide-formoterol (SYMBICORT) 160-4.5 MCG/ACT inhaler, Inhale 2 puffs 2 (Two) Times a Day., Disp: , Rfl:   •  cetirizine (zyrTEC) 10 MG tablet, Take 10 mg by mouth Daily., Disp: , Rfl:   •  glipiZIDE (GLUCOTROL) 5 MG ER tablet, Take 5 mg by mouth Daily., Disp: , Rfl:   •  ipratropium (ATROVENT) 0.06 % nasal spray, , Disp: , Rfl:   •  lisinopril (PRINIVIL,ZESTRIL) 5 MG tablet, , Disp: , Rfl:   •  Loratadine (CLARITIN PO), Take  by mouth As Needed., Disp: , Rfl:   •  metFORMIN (GLUCOPHAGE) 500 MG tablet, Take 500 mg by mouth every night at bedtime., Disp: , Rfl:   •  montelukast (SINGULAIR) 10 MG tablet, Take 10 mg by mouth Every Night., Disp: , Rfl:   •  nortriptyline (PAMELOR) 10 MG capsule, Take 1 capsule by mouth Every Night., Disp: 90 capsule, Rfl: 3  •  pantoprazole (PROTONIX) 40 MG EC tablet, Take 1 tablet by mouth Daily., Disp: 90 tablet, Rfl: 3  •  simvastatin (ZOCOR) 10 MG tablet, Take 10 mg by mouth Every Night., Disp: , Rfl:   •  SPIRIVA RESPIMAT 1.25 MCG/ACT aerosol solution inhaler, , Disp: , Rfl:   •  mometasone-formoterol (DULERA 100) 100-5 MCG/ACT inhaler, Inhale 2 puffs 2 (Two) Times a Day., Disp: , Rfl:     There is no immunization history on file for this patient.    Review of Systems       Except as outlined in history of physical illness, patient denies any changes in her GYN, , GI systems. All other systems reviewed are negative.    Objective   Physical Exam   Alert and oriented, respirations unlabored, heart regular rate and rhythm   Pelvic external genitalia atrophic female    Procedure note   after bimanual exam speculum was inserted, cervix cleansed.  Tenaculum was used to grasp the anterior lip of the cervix.  Pipelle was used to retrieve endometrial tissue.  3 passes were performed, adequate amount of tissue appeared with each pass  Tenaculum was removed  There was no blood loss  There were no complications  Patient tolerated procedure well  She will  call for test results if she has not heard within 7 days.      Assessment/Plan   Olinda was seen today for gynecologic exam.    Diagnoses and all orders for this visit:    Thickened endometrium  -     Reference Histopathology    Endometrial mass                         EMR Dragon/ Transcription disclaimer:  Much of the encounter note is an electronic transcription/translation of spoken language to printed text. The electronic translation of spoken language may permit erroneous, or at times, nonessential words or phrases to be inadvertently transcribes; Although i have reviewed the note for such errors, some may still exist.

## 2020-07-30 ENCOUNTER — PREP FOR SURGERY (OUTPATIENT)
Dept: OTHER | Facility: HOSPITAL | Age: 75
End: 2020-07-30

## 2020-07-30 DIAGNOSIS — N84.0 ENDOMETRIAL POLYP: Primary | ICD-10-CM

## 2020-07-30 LAB
DX ICD CODE: NORMAL
PATH REPORT.FINAL DX SPEC: NORMAL
PATH REPORT.GROSS SPEC: NORMAL
PATH REPORT.SITE OF ORIGIN SPEC: NORMAL
PATHOLOGIST NAME: NORMAL
PAYMENT PROCEDURE: NORMAL

## 2020-07-30 RX ORDER — SODIUM CHLORIDE 0.9 % (FLUSH) 0.9 %
3 SYRINGE (ML) INJECTION EVERY 12 HOURS SCHEDULED
Status: CANCELLED | OUTPATIENT
Start: 2020-10-14

## 2020-07-30 RX ORDER — SODIUM CHLORIDE 0.9 % (FLUSH) 0.9 %
10 SYRINGE (ML) INJECTION AS NEEDED
Status: CANCELLED | OUTPATIENT
Start: 2020-10-14

## 2020-10-12 ENCOUNTER — APPOINTMENT (OUTPATIENT)
Dept: PREADMISSION TESTING | Facility: HOSPITAL | Age: 75
End: 2020-10-12

## 2020-10-12 VITALS
HEIGHT: 62 IN | WEIGHT: 169 LBS | OXYGEN SATURATION: 100 % | SYSTOLIC BLOOD PRESSURE: 156 MMHG | BODY MASS INDEX: 31.1 KG/M2 | HEART RATE: 97 BPM | TEMPERATURE: 99 F | RESPIRATION RATE: 18 BRPM | DIASTOLIC BLOOD PRESSURE: 99 MMHG

## 2020-10-12 DIAGNOSIS — N84.0 ENDOMETRIAL POLYP: ICD-10-CM

## 2020-10-12 LAB
ANION GAP SERPL CALCULATED.3IONS-SCNC: 11.5 MMOL/L (ref 5–15)
BASOPHILS # BLD AUTO: 0.02 10*3/MM3 (ref 0–0.2)
BASOPHILS NFR BLD AUTO: 0.3 % (ref 0–1.5)
BUN SERPL-MCNC: 13 MG/DL (ref 8–23)
BUN/CREAT SERPL: 17.8 (ref 7–25)
CALCIUM SPEC-SCNC: 9.8 MG/DL (ref 8.6–10.5)
CHLORIDE SERPL-SCNC: 104 MMOL/L (ref 98–107)
CO2 SERPL-SCNC: 24.5 MMOL/L (ref 22–29)
CREAT SERPL-MCNC: 0.73 MG/DL (ref 0.57–1)
DEPRECATED RDW RBC AUTO: 42.1 FL (ref 37–54)
EOSINOPHIL # BLD AUTO: 0.06 10*3/MM3 (ref 0–0.4)
EOSINOPHIL NFR BLD AUTO: 0.9 % (ref 0.3–6.2)
ERYTHROCYTE [DISTWIDTH] IN BLOOD BY AUTOMATED COUNT: 13.9 % (ref 12.3–15.4)
GFR SERPL CREATININE-BSD FRML MDRD: 78 ML/MIN/1.73
GLUCOSE SERPL-MCNC: 170 MG/DL (ref 65–99)
HCT VFR BLD AUTO: 41.7 % (ref 34–46.6)
HGB BLD-MCNC: 13.5 G/DL (ref 12–15.9)
IMM GRANULOCYTES # BLD AUTO: 0.01 10*3/MM3 (ref 0–0.05)
IMM GRANULOCYTES NFR BLD AUTO: 0.2 % (ref 0–0.5)
LYMPHOCYTES # BLD AUTO: 1.74 10*3/MM3 (ref 0.7–3.1)
LYMPHOCYTES NFR BLD AUTO: 27 % (ref 19.6–45.3)
MCH RBC QN AUTO: 26.9 PG (ref 26.6–33)
MCHC RBC AUTO-ENTMCNC: 32.4 G/DL (ref 31.5–35.7)
MCV RBC AUTO: 83.1 FL (ref 79–97)
MONOCYTES # BLD AUTO: 0.57 10*3/MM3 (ref 0.1–0.9)
MONOCYTES NFR BLD AUTO: 8.9 % (ref 5–12)
NEUTROPHILS NFR BLD AUTO: 4.04 10*3/MM3 (ref 1.7–7)
NEUTROPHILS NFR BLD AUTO: 62.7 % (ref 42.7–76)
NRBC BLD AUTO-RTO: 0 /100 WBC (ref 0–0.2)
PLATELET # BLD AUTO: 243 10*3/MM3 (ref 140–450)
PMV BLD AUTO: 8.5 FL (ref 6–12)
POTASSIUM SERPL-SCNC: 4.5 MMOL/L (ref 3.5–5.2)
RBC # BLD AUTO: 5.02 10*6/MM3 (ref 3.77–5.28)
SODIUM SERPL-SCNC: 140 MMOL/L (ref 136–145)
WBC # BLD AUTO: 6.44 10*3/MM3 (ref 3.4–10.8)

## 2020-10-12 PROCEDURE — 93005 ELECTROCARDIOGRAM TRACING: CPT

## 2020-10-12 PROCEDURE — 36415 COLL VENOUS BLD VENIPUNCTURE: CPT

## 2020-10-12 PROCEDURE — U0004 COV-19 TEST NON-CDC HGH THRU: HCPCS | Performed by: NURSE PRACTITIONER

## 2020-10-12 PROCEDURE — 85025 COMPLETE CBC W/AUTO DIFF WBC: CPT | Performed by: OBSTETRICS & GYNECOLOGY

## 2020-10-12 PROCEDURE — 93010 ELECTROCARDIOGRAM REPORT: CPT | Performed by: INTERNAL MEDICINE

## 2020-10-12 PROCEDURE — 80048 BASIC METABOLIC PNL TOTAL CA: CPT | Performed by: OBSTETRICS & GYNECOLOGY

## 2020-10-12 PROCEDURE — C9803 HOPD COVID-19 SPEC COLLECT: HCPCS

## 2020-10-12 RX ORDER — ALBUTEROL SULFATE 90 UG/1
2 AEROSOL, METERED RESPIRATORY (INHALATION) EVERY 4 HOURS PRN
COMMUNITY

## 2020-10-12 RX ORDER — LEVOCETIRIZINE DIHYDROCHLORIDE 5 MG/1
5 TABLET, FILM COATED ORAL AS NEEDED
COMMUNITY
End: 2022-11-22

## 2020-10-12 RX ORDER — CHLORHEXIDINE GLUCONATE 500 MG/1
CLOTH TOPICAL TAKE AS DIRECTED
COMMUNITY
End: 2022-06-13

## 2020-10-12 NOTE — DISCHARGE INSTRUCTIONS
Take the following medications the morning of surgery: INHALERS AND  PANTOPRAZOLE    ARRIVE AT 1130AM    If you are on prescription narcotic pain medication to control your pain you may also take that medication the morning of surgery.    General Instructions:  • Do not eat solid food after midnight the night before surgery.  • You may drink clear liquids day of surgery but must stop at least one hour before your hospital arrival time.  • It is beneficial for you to have a clear drink that contains carbohydrates the day of surgery.  We suggest a 12 to 20 ounce bottle of Gatorade or Powerade for non-diabetic patients or a 12 to 20 ounce bottle of G2 or Powerade Zero for diabetic patients. (Pediatric patients, are not advised to drink a 12 to 20 ounce carbohydrate drink)    Clear liquids are liquids you can see through.  Nothing red in color.     Plain water                               Sports drinks  Sodas                                   Gelatin (Jell-O)  Fruit juices without pulp such as white grape juice and apple juice  Popsicles that contain no fruit or yogurt  Tea or coffee (no cream or milk added)  Gatorade / Powerade  G2 / Powerade Zero    • Infants may have breast milk up to four hours before surgery.  • Infants drinking formula may drink formula up to six hours before surgery.   • Patients who avoid smoking, chewing tobacco and alcohol for 4 weeks prior to surgery have a reduced risk of post-operative complications.  Quit smoking as many days before surgery as you can.  • Do not smoke, use chewing tobacco or drink alcohol the day of surgery.   • If applicable bring your C-PAP/ BI-PAP machine.  • Bring any papers given to you in the doctor’s office.  • Wear clean comfortable clothes.  • Do not wear contact lenses, false eyelashes or make-up.  Bring a case for your glasses.   • Bring crutches or walker if applicable.  • Remove all piercings.  Leave jewelry and any other valuables at home.  • Hair extensions  with metal clips must be removed prior to surgery.  • The Pre-Admission Testing nurse will instruct you to bring medications if unable to obtain an accurate list in Pre-Admission Testing.        If you were given a blood bank ID arm band remember to bring it with you the day of surgery.    Preventing a Surgical Site Infection:  • For 2 to 3 days before surgery, avoid shaving with a razor because the razor can irritate skin and make it easier to develop an infection.    • Any areas of open skin can increase the risk of a post-operative wound infection by allowing bacteria to enter and travel throughout the body.  Notify your surgeon if you have any skin wounds / rashes even if it is not near the expected surgical site.  The area will need assessed to determine if surgery should be delayed until it is healed.  • The night prior to surgery shower using a fresh bar of anti-bacterial soap (such as Dial) and clean washcloth.  Sleep in a clean bed with clean clothing.  Do not allow pets to sleep with you.  • Shower on the morning of surgery using a fresh bar of anti-bacterial soap (such as Dial) and clean washcloth.  Dry with a clean towel and dress in clean clothing.  • Ask your surgeon if you will be receiving antibiotics prior to surgery.  • Make sure you, your family, and all healthcare providers clean their hands with soap and water or an alcohol based hand  before caring for you or your wound.    Day of surgery:  Your arrival time is approximately two hours before your scheduled surgery time.  Upon arrival, a Pre-op nurse and Anesthesiologist will review your health history, obtain vital signs, and answer questions you may have.  The only belongings needed at this time will be a list of your home medications and if applicable your C-PAP/BI-PAP machine.  If you are staying overnight your family can leave the rest of your belongings in the car and bring them to your room later.  A Pre-op nurse will start an IV  and you may receive medication in preparation for surgery, including something to help you relax.  While you are in surgery your family should notify the waiting room  if they leave the waiting room area and provide a contact phone number.    Please be aware that surgery does come with discomfort.  We want to make every effort to control your discomfort so please discuss any uncontrolled symptoms with your nurse.   Your doctor will most likely have prescribed pain medications.      If you are going home after surgery you will receive individualized written care instructions before being discharged.  A responsible adult must drive you to and from the hospital on the day of your surgery and stay with you for 24 hours.    If you are staying overnight following surgery, you will be transported to your hospital room following the recovery period.  [facility] has all private rooms.    If you have any questions please call Pre-Admission Testing at [phone].  Deductibles and co-payments are collected on the day of service. Please be prepared to pay the required co-pay, deductible or deposit on the day of service as defined by your plan.    Patient Education for Self-Quarantine Process    Following your COVID testing, we strongly recommend that you do not leave your home after you have been tested for COVID except to get medical care. This includes not going to work, school or to public areas.  If this is not possible for you to do please limit your activities to only required outings.  Be sure to wear a mask when you are with other people, practice social distancing and wash your hands frequently.      The following items provide additional details to keep you safe.  • Wash your hands with soap and water frequently for at least 20 seconds.   • Avoid touching your eyes, nose and mouth with unwashed hands.  • Do not share anything - utensils, towels, food from the same bowl.   • Have your own  utensils, drinking glass, dishes, towels and bedding.   • Do not have visitors.   • Do use FaceTime to stay in touch with family and friends.  • You should stay in a specific room away from others if possible.   • Stay at least 6 feet away from others in the home if you cannot have a dedicated room to yourself.   • Do not snuggle with your pet. While the CDC says there is no evidence that pets can spread COVID-19 or be infected from humans, it is probably best to avoid “petting, snuggling, being kissed or licked and sharing food (during self-quarantine)”, according to the CDC.   • Sanitize household surfaces daily. Include all high touch areas (door handles, light switches, phones, countertops, etc.)  • Do not share a bathroom with others, if possible.   • Wear a mask around others in your home if you are unable to stay in a separate room or 6 feet apart. If  you are unable to wear a mask, have your family member wear a mask if they must be within 6 feet of you.   Call your surgeon immediately if you experience any of the following symptoms:  • Sore Throat  • Shortness of Breath or difficulty breathing  • Cough  • Chills  • Body soreness or muscle pain  • Headache  • Fever  • New loss of taste or smell  • Do not arrive for your surgery ill.  Your procedure will need to be rescheduled to another time.  You will need to call your physician before the day of surgery to avoid any unnecessary exposure to hospital staff as well as other patients.

## 2020-10-13 LAB — SARS-COV-2 RNA RESP QL NAA+PROBE: NOT DETECTED

## 2020-10-14 ENCOUNTER — ANESTHESIA EVENT (OUTPATIENT)
Dept: PERIOP | Facility: HOSPITAL | Age: 75
End: 2020-10-14

## 2020-10-14 ENCOUNTER — HOSPITAL ENCOUNTER (OUTPATIENT)
Facility: HOSPITAL | Age: 75
Setting detail: HOSPITAL OUTPATIENT SURGERY
Discharge: HOME | End: 2020-10-14
Attending: OBSTETRICS & GYNECOLOGY | Admitting: OBSTETRICS & GYNECOLOGY

## 2020-10-14 ENCOUNTER — ANESTHESIA (OUTPATIENT)
Dept: PERIOP | Facility: HOSPITAL | Age: 75
End: 2020-10-14

## 2020-10-14 VITALS
SYSTOLIC BLOOD PRESSURE: 134 MMHG | RESPIRATION RATE: 16 BRPM | HEART RATE: 73 BPM | OXYGEN SATURATION: 95 % | TEMPERATURE: 97.4 F | DIASTOLIC BLOOD PRESSURE: 75 MMHG

## 2020-10-14 DIAGNOSIS — N84.0 ENDOMETRIAL POLYP: ICD-10-CM

## 2020-10-14 LAB — GLUCOSE BLDC GLUCOMTR-MCNC: 148 MG/DL (ref 70–130)

## 2020-10-14 PROCEDURE — 88305 TISSUE EXAM BY PATHOLOGIST: CPT | Performed by: OBSTETRICS & GYNECOLOGY

## 2020-10-14 PROCEDURE — C1782 MORCELLATOR: HCPCS | Performed by: OBSTETRICS & GYNECOLOGY

## 2020-10-14 PROCEDURE — 25010000002 FENTANYL CITRATE (PF) 100 MCG/2ML SOLUTION: Performed by: NURSE ANESTHETIST, CERTIFIED REGISTERED

## 2020-10-14 PROCEDURE — 58558 HYSTEROSCOPY BIOPSY: CPT | Performed by: OBSTETRICS & GYNECOLOGY

## 2020-10-14 PROCEDURE — S0260 H&P FOR SURGERY: HCPCS | Performed by: OBSTETRICS & GYNECOLOGY

## 2020-10-14 PROCEDURE — 25010000002 PROPOFOL 10 MG/ML EMULSION: Performed by: NURSE ANESTHETIST, CERTIFIED REGISTERED

## 2020-10-14 PROCEDURE — 25010000002 ONDANSETRON PER 1 MG: Performed by: NURSE ANESTHETIST, CERTIFIED REGISTERED

## 2020-10-14 PROCEDURE — 82962 GLUCOSE BLOOD TEST: CPT

## 2020-10-14 PROCEDURE — 25010000002 KETOROLAC TROMETHAMINE PER 15 MG: Performed by: NURSE ANESTHETIST, CERTIFIED REGISTERED

## 2020-10-14 RX ORDER — FENTANYL CITRATE 50 UG/ML
50 INJECTION, SOLUTION INTRAMUSCULAR; INTRAVENOUS
Status: DISCONTINUED | OUTPATIENT
Start: 2020-10-14 | End: 2020-10-14 | Stop reason: HOSPADM

## 2020-10-14 RX ORDER — LABETALOL HYDROCHLORIDE 5 MG/ML
5 INJECTION, SOLUTION INTRAVENOUS
Status: DISCONTINUED | OUTPATIENT
Start: 2020-10-14 | End: 2020-10-14 | Stop reason: HOSPADM

## 2020-10-14 RX ORDER — FLUMAZENIL 0.1 MG/ML
0.2 INJECTION INTRAVENOUS AS NEEDED
Status: DISCONTINUED | OUTPATIENT
Start: 2020-10-14 | End: 2020-10-14 | Stop reason: HOSPADM

## 2020-10-14 RX ORDER — OXYCODONE AND ACETAMINOPHEN 7.5; 325 MG/1; MG/1
1 TABLET ORAL ONCE AS NEEDED
Status: DISCONTINUED | OUTPATIENT
Start: 2020-10-14 | End: 2020-10-14 | Stop reason: HOSPADM

## 2020-10-14 RX ORDER — PROPOFOL 10 MG/ML
VIAL (ML) INTRAVENOUS AS NEEDED
Status: DISCONTINUED | OUTPATIENT
Start: 2020-10-14 | End: 2020-10-14 | Stop reason: SURG

## 2020-10-14 RX ORDER — PROMETHAZINE HYDROCHLORIDE 25 MG/1
25 SUPPOSITORY RECTAL ONCE AS NEEDED
Status: DISCONTINUED | OUTPATIENT
Start: 2020-10-14 | End: 2020-10-14 | Stop reason: HOSPADM

## 2020-10-14 RX ORDER — DIPHENHYDRAMINE HCL 25 MG
25 CAPSULE ORAL
Status: DISCONTINUED | OUTPATIENT
Start: 2020-10-14 | End: 2020-10-14 | Stop reason: HOSPADM

## 2020-10-14 RX ORDER — ALBUTEROL SULFATE 2.5 MG/3ML
2.5 SOLUTION RESPIRATORY (INHALATION) ONCE AS NEEDED
Status: DISCONTINUED | OUTPATIENT
Start: 2020-10-14 | End: 2020-10-14 | Stop reason: HOSPADM

## 2020-10-14 RX ORDER — HYDROCODONE BITARTRATE AND ACETAMINOPHEN 7.5; 325 MG/1; MG/1
1 TABLET ORAL ONCE AS NEEDED
Status: COMPLETED | OUTPATIENT
Start: 2020-10-14 | End: 2020-10-14

## 2020-10-14 RX ORDER — SODIUM CHLORIDE 9 MG/ML
INJECTION, SOLUTION INTRAVENOUS AS NEEDED
Status: DISCONTINUED | OUTPATIENT
Start: 2020-10-14 | End: 2020-10-14 | Stop reason: HOSPADM

## 2020-10-14 RX ORDER — FLUCONAZOLE 150 MG/1
150 TABLET ORAL DAILY
Qty: 3 TABLET | Refills: 0 | Status: SHIPPED | OUTPATIENT
Start: 2020-10-14 | End: 2020-10-17

## 2020-10-14 RX ORDER — SODIUM CHLORIDE 0.9 % (FLUSH) 0.9 %
3-10 SYRINGE (ML) INJECTION AS NEEDED
Status: DISCONTINUED | OUTPATIENT
Start: 2020-10-14 | End: 2020-10-14 | Stop reason: HOSPADM

## 2020-10-14 RX ORDER — LIDOCAINE HYDROCHLORIDE 20 MG/ML
INJECTION, SOLUTION INFILTRATION; PERINEURAL AS NEEDED
Status: DISCONTINUED | OUTPATIENT
Start: 2020-10-14 | End: 2020-10-14 | Stop reason: SURG

## 2020-10-14 RX ORDER — FENTANYL CITRATE 50 UG/ML
INJECTION, SOLUTION INTRAMUSCULAR; INTRAVENOUS AS NEEDED
Status: DISCONTINUED | OUTPATIENT
Start: 2020-10-14 | End: 2020-10-14 | Stop reason: SURG

## 2020-10-14 RX ORDER — SODIUM CHLORIDE, SODIUM LACTATE, POTASSIUM CHLORIDE, CALCIUM CHLORIDE 600; 310; 30; 20 MG/100ML; MG/100ML; MG/100ML; MG/100ML
100 INJECTION, SOLUTION INTRAVENOUS CONTINUOUS
Status: DISCONTINUED | OUTPATIENT
Start: 2020-10-14 | End: 2020-10-14 | Stop reason: HOSPADM

## 2020-10-14 RX ORDER — SODIUM CHLORIDE, SODIUM LACTATE, POTASSIUM CHLORIDE, CALCIUM CHLORIDE 600; 310; 30; 20 MG/100ML; MG/100ML; MG/100ML; MG/100ML
9 INJECTION, SOLUTION INTRAVENOUS CONTINUOUS
Status: DISCONTINUED | OUTPATIENT
Start: 2020-10-14 | End: 2020-10-14 | Stop reason: HOSPADM

## 2020-10-14 RX ORDER — KETOROLAC TROMETHAMINE 30 MG/ML
INJECTION, SOLUTION INTRAMUSCULAR; INTRAVENOUS AS NEEDED
Status: DISCONTINUED | OUTPATIENT
Start: 2020-10-14 | End: 2020-10-14 | Stop reason: SURG

## 2020-10-14 RX ORDER — NALOXONE HCL 0.4 MG/ML
0.2 VIAL (ML) INJECTION AS NEEDED
Status: DISCONTINUED | OUTPATIENT
Start: 2020-10-14 | End: 2020-10-14 | Stop reason: HOSPADM

## 2020-10-14 RX ORDER — ONDANSETRON 2 MG/ML
INJECTION INTRAMUSCULAR; INTRAVENOUS AS NEEDED
Status: DISCONTINUED | OUTPATIENT
Start: 2020-10-14 | End: 2020-10-14 | Stop reason: SURG

## 2020-10-14 RX ORDER — SODIUM CHLORIDE 0.9 % (FLUSH) 0.9 %
3 SYRINGE (ML) INJECTION EVERY 12 HOURS SCHEDULED
Status: DISCONTINUED | OUTPATIENT
Start: 2020-10-14 | End: 2020-10-14 | Stop reason: HOSPADM

## 2020-10-14 RX ORDER — EPHEDRINE SULFATE 50 MG/ML
5 INJECTION, SOLUTION INTRAVENOUS ONCE AS NEEDED
Status: DISCONTINUED | OUTPATIENT
Start: 2020-10-14 | End: 2020-10-14 | Stop reason: HOSPADM

## 2020-10-14 RX ORDER — PROMETHAZINE HYDROCHLORIDE 25 MG/1
25 TABLET ORAL ONCE AS NEEDED
Status: DISCONTINUED | OUTPATIENT
Start: 2020-10-14 | End: 2020-10-14 | Stop reason: HOSPADM

## 2020-10-14 RX ORDER — FLUCONAZOLE 150 MG/1
150 TABLET ORAL DAILY
Qty: 3 TABLET | Refills: 0 | Status: SHIPPED | OUTPATIENT
Start: 2020-10-14 | End: 2020-10-14

## 2020-10-14 RX ORDER — HYDROMORPHONE HYDROCHLORIDE 1 MG/ML
0.5 INJECTION, SOLUTION INTRAMUSCULAR; INTRAVENOUS; SUBCUTANEOUS
Status: DISCONTINUED | OUTPATIENT
Start: 2020-10-14 | End: 2020-10-14 | Stop reason: HOSPADM

## 2020-10-14 RX ORDER — MIDAZOLAM HYDROCHLORIDE 1 MG/ML
1 INJECTION INTRAMUSCULAR; INTRAVENOUS
Status: DISCONTINUED | OUTPATIENT
Start: 2020-10-14 | End: 2020-10-14 | Stop reason: HOSPADM

## 2020-10-14 RX ORDER — ONDANSETRON 2 MG/ML
4 INJECTION INTRAMUSCULAR; INTRAVENOUS ONCE AS NEEDED
Status: DISCONTINUED | OUTPATIENT
Start: 2020-10-14 | End: 2020-10-14 | Stop reason: HOSPADM

## 2020-10-14 RX ORDER — DIPHENHYDRAMINE HYDROCHLORIDE 50 MG/ML
12.5 INJECTION INTRAMUSCULAR; INTRAVENOUS
Status: DISCONTINUED | OUTPATIENT
Start: 2020-10-14 | End: 2020-10-14 | Stop reason: HOSPADM

## 2020-10-14 RX ORDER — SODIUM CHLORIDE 0.9 % (FLUSH) 0.9 %
10 SYRINGE (ML) INJECTION AS NEEDED
Status: DISCONTINUED | OUTPATIENT
Start: 2020-10-14 | End: 2020-10-14 | Stop reason: HOSPADM

## 2020-10-14 RX ORDER — ACETAMINOPHEN 500 MG
500 TABLET ORAL ONCE
Status: COMPLETED | OUTPATIENT
Start: 2020-10-14 | End: 2020-10-14

## 2020-10-14 RX ADMIN — PROPOFOL 50 MG: 10 INJECTION, EMULSION INTRAVENOUS at 13:39

## 2020-10-14 RX ADMIN — KETOROLAC TROMETHAMINE 30 MG: 30 INJECTION, SOLUTION INTRAMUSCULAR; INTRAVENOUS at 13:59

## 2020-10-14 RX ADMIN — SODIUM CHLORIDE, POTASSIUM CHLORIDE, SODIUM LACTATE AND CALCIUM CHLORIDE 9 ML/HR: 600; 310; 30; 20 INJECTION, SOLUTION INTRAVENOUS at 12:15

## 2020-10-14 RX ADMIN — PROPOFOL 50 MG: 10 INJECTION, EMULSION INTRAVENOUS at 13:40

## 2020-10-14 RX ADMIN — LIDOCAINE HYDROCHLORIDE 60 MG: 20 INJECTION, SOLUTION INFILTRATION; PERINEURAL at 13:36

## 2020-10-14 RX ADMIN — ONDANSETRON HYDROCHLORIDE 4 MG: 2 SOLUTION INTRAMUSCULAR; INTRAVENOUS at 13:59

## 2020-10-14 RX ADMIN — FENTANYL CITRATE 50 MCG: 50 INJECTION INTRAMUSCULAR; INTRAVENOUS at 13:33

## 2020-10-14 RX ADMIN — HYDROCODONE BITARTRATE AND ACETAMINOPHEN 1 TABLET: 7.5; 325 TABLET ORAL at 14:36

## 2020-10-14 RX ADMIN — PROPOFOL 50 MG: 10 INJECTION, EMULSION INTRAVENOUS at 13:38

## 2020-10-14 RX ADMIN — ACETAMINOPHEN 500 MG: 500 TABLET, FILM COATED ORAL at 12:23

## 2020-10-14 RX ADMIN — PROPOFOL 150 MG: 10 INJECTION, EMULSION INTRAVENOUS at 13:36

## 2020-10-14 RX ADMIN — PROPOFOL 50 MG: 10 INJECTION, EMULSION INTRAVENOUS at 13:37

## 2020-10-14 RX ADMIN — FENTANYL CITRATE 50 MCG: 50 INJECTION, SOLUTION INTRAMUSCULAR; INTRAVENOUS at 14:35

## 2020-10-14 NOTE — ANESTHESIA POSTPROCEDURE EVALUATION
Patient: Olinda Baumann    Procedure Summary     Date: 10/14/20 Room / [facility]:  [facility]  [facility]    Anesthesia Start: 1333 Anesthesia Stop: 1414    Procedure: DILATATION AND CURETTAGE HYSTEROSCOPY WITH MYOSURE (N/A Uterus) Diagnosis:       Endometrial polyp      (Endometrial polyp [N84.0])    Surgeon: Mariano Calix MD Provider: Emigdio Larkin MD    Anesthesia Type: general ASA Status: 3          Anesthesia Type: general    Vitals  Vitals Value Taken Time   /81 10/14/20 1430   Temp 36.1 °C (97 °F) 10/14/20 1408   Pulse 78 10/14/20 1443   Resp 16 10/14/20 1430   SpO2 92 % 10/14/20 1443   Vitals shown include unvalidated device data.        Post Anesthesia Care and Evaluation    Patient location during evaluation: bedside  Patient participation: complete - patient participated  Level of consciousness: awake and alert  Pain management: adequate  Airway patency: patent  Anesthetic complications: No anesthetic complications    Cardiovascular status: acceptable  Respiratory status: acceptable  Hydration status: acceptable    Comments: /81   Pulse 78   Temp 36.1 °C (97 °F) (Oral)   Resp 16   SpO2 98%

## 2020-10-14 NOTE — ANESTHESIA PREPROCEDURE EVALUATION
Anesthesia Evaluation     history of anesthetic complications: prolonged sedation  NPO Solid Status: > 8 hours  NPO Liquid Status: > 2 hours           Airway   Mallampati: II  Neck ROM: full  no difficulty expected  Dental    (+) upper dentures    Pulmonary - normal exam   (+) asthma,  (-) COPD, sleep apnea, not a smoker    ROS comment: Chronic cough;  Pulmonary nodules  PE comment: nonlabored  Cardiovascular - normal exam    Rhythm: regular  Rate: normal    (+) hypertension, hyperlipidemia,   (-) valvular problems/murmurs, past MI, CAD, dysrhythmias, angina      Neuro/Psych- negative ROS  (-) seizures, TIA, CVA  GI/Hepatic/Renal/Endo    (+)  GERD,  diabetes mellitus type 2 well controlled, thyroid problem (goiter)   (-) liver disease, no renal disease    Musculoskeletal     (+) arthralgias,   Abdominal    Substance History      OB/GYN      Comment: Endometrial polyp      Other   arthritis,    history of cancer (R LE skin cancer)                  Anesthesia Plan    ASA 3     general     intravenous induction     Anesthetic plan, all risks, benefits, and alternatives have been provided, discussed and informed consent has been obtained with: patient.

## 2020-10-14 NOTE — ANESTHESIA PROCEDURE NOTES
Airway  Urgency: elective    Date/Time: 10/14/2020 1:40 PM  Airway not difficult    General Information and Staff    Patient location during procedure: OR  Anesthesiologist: Emigdio Larkin MD  CRNA: Megan Duckworth CRNA    Indications and Patient Condition  Indications for airway management: airway protection    Preoxygenated: yes  MILS maintained throughout  Mask difficulty assessment: 0 - not attempted    Final Airway Details  Final airway type: supraglottic airway      Successful airway: unique  Size 4    Number of attempts at approach: 1  Assessment: lips, teeth, and gum same as pre-op    Additional Comments  Atraumatic ET Tube placement.  Teeth as pre-op. BLEBS.  -ABD sounds.  +ET CO2.  Secured to face

## 2020-10-14 NOTE — OP NOTE
Hysteroscopy with Novasure  Procedure Note    Olinda Baumann  10/14/2020    Pre-op Diagnosis: Endometrial polyp      Post-op Diagnosis:     Same    Procedure(s):  DILATATION AND CURETTAGE HYSTEROSCOPY WITH MYOSURE    Surgeon(s):  Mariano Calix MD    Anesthesia: General    Staff:   Circulator: Renetta Morrell RN  Scrub Person: Fei Trevino    Estimated Blood Loss: Less than 5 cc    Specimens:                Order Name Source Comment Collection Info Order Time   PREGNANCY, URINE  POCT. PRN for all menstruating females.  10/14/2020 12:02 PM   TISSUE PATHOLOGY EXAM Endometrial Curettings  Collected By: Mariano Calix MD 10/14/2020  1:56 PM         Drains: * No LDAs found *    Findings: Endometrial polyp 5 mm on the anterior wall    Complications: None    Procedure: After reviewing risks, benefits, & complications of surgery with patient, she was taken to the OR and prepped and draped in the usual fashion.  EUA was performed, bladderr was drained, weighted speculum was inserted into the posterior fornix vagina, and single-tooth tenaculum was placed on the uterine cervix.  Cervix was progressively dilated with dilators.  Uterus was sounded to 7    Hysteroscopy was inserted after obtaining white balance.  Both ostia were visualized, normal-appearing endometrium was noted.  MyoSure device was utilized.  Polyp was removed, the surrounding endometrial tissue was also removed and sampled.  Hysteroscope was withdrawn.       Single-tooth tenaculum was removed from the cervix.  Minimal bleeding from tenaculum site was noted.  All specimens were sent to pathology for further study.  Patient was awakened and taken to recovery room in stable condition.      Mariano Calix MD     Date: 10/14/2020  Time: 14:15 EDT    EMR Dragon/ Transcription disclaimer:  Much of the encounter note is an electronic transcription/translation of spoken language to printed text. The electronic translation of spoken language may permit  erroneous, or at times, nonessential words or phrases to be inadvertently transcribes; Although i have reviewed the note for such errors, some may still exist.

## 2020-10-14 NOTE — CONSULTS
[facility]  Olinda Baumann  : 1945  MRN: [id number]  CSN: [id number]    History and Physical  No chief complaint on file.    Subjective   Olinda Baumann is a 75 y.o. year old No obstetric history on file. who present for surgery due to presence of suspected endometrial polyp.  This has been confirmed by CT scan and ultrasound.  Patient had an endometrial biopsy which was benign.  She is admitted for hysteroscopy polypectomy likely to be accomplished with my Essure.  Risk benefits potential complications reviewed..    Past Medical History:   Diagnosis Date   • Ankle fracture    • Arthritis    • Asthma    • Breast cyst    • Cancer of skin of right leg    • Diabetes mellitus (CMS/HCC)    • Fibrocystic breast    • GERD (gastroesophageal reflux disease)    • Goiter     INWARD GOITER   • History of colon polyps    • Hyperlipidemia    • Hypertension    • Seasonal allergies    • Urine frequency    • Uterine polyp      Past Surgical History:   Procedure Laterality Date   • ANKLE SURGERY Left     WITH METAL   • BREAST CYST EXCISION Left     about 20 years ago   • CATARACT EXTRACTION EXTRACAPSULAR W/ INTRAOCULAR LENS IMPLANTATION     • COLONOSCOPY N/A 3/6/2017    TA polyps, IH   • COLONOSCOPY N/A 2019    Procedure: COLONOSCOPY TO CECUM WITH COLD BIOPSIES;  Surgeon: Marquez Alan MD;  Location: [facility] ENDOSCOPY;  Service: Gastroenterology   • FRACTURE SURGERY Left     ANKLE   • MOUTH SURGERY     • SKIN CANCER EXCISION Right     LEG     Social History    Tobacco Use      Smoking status: Never Smoker      Smokeless tobacco: Never Used      Current Facility-Administered Medications:   •  lactated ringers infusion, 9 mL/hr, Intravenous, Continuous, Emigdio Larkin MD, Last Rate: 9 mL/hr at 10/14/20 1215, 9 mL/hr at 10/14/20 1215  •  midazolam (VERSED) injection 1 mg, 1 mg, Intravenous, Q10 Min PRN, Emigdio Larkin MD  •  sodium chloride 0.9 % flush 10 mL, 10 mL, Intravenous, PRN, Yogi  Mariano DUARTE MD  •  sodium chloride 0.9 % flush 3 mL, 3 mL, Intravenous, Q12H, Mariano Calix MD  •  sodium chloride 0.9 % flush 3 mL, 3 mL, Intravenous, Q12H, Emigdio Larkin MD  •  sodium chloride 0.9 % flush 3-10 mL, 3-10 mL, Intravenous, PRN, Emigdio Larkin MD    Allergies   Allergen Reactions   • Sulfa Antibiotics Other (See Comments)     Does not remember reaction       Review of Systems      Except as outlined in history of physical illness, patient denies any changes in her GYN, , GI systems. All other systems reviewed are negative.        Objective   /85 (BP Location: Left arm, Patient Position: Sitting)   Pulse 100   Temp 97.6 °F (36.4 °C) (Oral)   Resp 16   SpO2 98%   General: well developed; well nourished  no acute distress  appears stated age   Heart: regular rate and rhythm, S1, S2 normal, no murmur, click, rub or gallop   Lungs: breathing is unlabored  clear to auscultation bilaterally   Abdomen: soft, non-tender; no masses  no umbilical or inguinal hernias are present  no hepato-splenomegaly   Pelvis:: Clinical staff was present for exam  Will be repeated under anesthesia   Labs  Lab Results   Component Value Date     10/12/2020    HGB 13.5 10/12/2020    HCT 41.7 10/12/2020    WBC 6.44 10/12/2020     10/12/2020    K 4.5 10/12/2020     10/12/2020    CO2 24.5 10/12/2020    BUN 13 10/12/2020    CREATININE 0.73 10/12/2020    GLUCOSE 170 (H) 10/12/2020    CALCIUM 9.8 10/12/2020        Assessment   1. Suspected endometrial polyp with previous confirmation by CT scan and ultrasound.  Biopsy of endometrium on 7/28/2020 was benign     Plan   1. Exam under anesthesia, hysteroscopy, polyp removal with MyoSure  2. Risk benefits potential complications reviewed    Mariano Calix MD  10/14/2020  13:06 EDT       EMR Dragon/ Transcription disclaimer:  Much of the encounter note is an electronic transcription/translation of spoken language to printed text. The electronic  translation of spoken language may permit erroneous, or at times, nonessential words or phrases to be inadvertently transcribes; Although i have reviewed the note for such errors, some may still exist.

## 2020-10-20 ENCOUNTER — TELEPHONE (OUTPATIENT)
Dept: OBSTETRICS AND GYNECOLOGY | Age: 75
End: 2020-10-20

## 2020-10-20 NOTE — TELEPHONE ENCOUNTER
(Yogi pt) DR. Calix performed a D&C hysteroscopy on this pt on 10/14.  Pt states she was told she would have some bleeding.  Pt says she is still bleeding like a normal period.  Is this normal?    [phone]

## 2020-10-20 NOTE — TELEPHONE ENCOUNTER
Can expect bleeding for 7-10 days if it is heavy and pt is sxatic, can offer problem visit for further evaluation. Otherwise, I would suggest monitoring and can schedule po visit with Dr kebede

## 2020-10-26 NOTE — PROGRESS NOTES
Please let patient know that the pathology report showed no hyperplasia or cancer.  It was consistent with a simple polyp as discussed

## 2020-10-27 ENCOUNTER — TELEPHONE (OUTPATIENT)
Dept: OBSTETRICS AND GYNECOLOGY | Age: 75
End: 2020-10-27

## 2020-10-27 NOTE — TELEPHONE ENCOUNTER
----- Message from Mariano Calix MD sent at 10/26/2020  1:00 PM EDT -----  Please let patient know that the pathology report showed no hyperplasia or cancer.  It was consistent with a simple polyp as discussed

## 2020-11-19 ENCOUNTER — TRANSCRIBE ORDERS (OUTPATIENT)
Dept: ADMINISTRATIVE | Facility: HOSPITAL | Age: 75
End: 2020-11-19

## 2020-11-19 DIAGNOSIS — R91.8 PULMONARY NODULES: Primary | ICD-10-CM

## 2020-11-25 ENCOUNTER — HOSPITAL ENCOUNTER (OUTPATIENT)
Dept: CT IMAGING | Facility: HOSPITAL | Age: 75
Discharge: HOME | End: 2020-11-25
Admitting: INTERNAL MEDICINE

## 2020-11-25 DIAGNOSIS — R91.8 PULMONARY NODULES: ICD-10-CM

## 2020-11-25 PROCEDURE — 71250 CT THORAX DX C-: CPT

## 2021-01-18 ENCOUNTER — APPOINTMENT (OUTPATIENT)
Dept: CT IMAGING | Facility: HOSPITAL | Age: 76
End: 2021-01-18

## 2021-01-18 ENCOUNTER — HOSPITAL ENCOUNTER (EMERGENCY)
Facility: HOSPITAL | Age: 76
Discharge: HOME | End: 2021-01-18
Attending: EMERGENCY MEDICINE | Admitting: EMERGENCY MEDICINE

## 2021-01-18 VITALS
SYSTOLIC BLOOD PRESSURE: 144 MMHG | HEART RATE: 121 BPM | HEIGHT: 62 IN | BODY MASS INDEX: 31.65 KG/M2 | RESPIRATION RATE: 18 BRPM | WEIGHT: 172 LBS | DIASTOLIC BLOOD PRESSURE: 99 MMHG | OXYGEN SATURATION: 95 % | TEMPERATURE: 97.7 F

## 2021-01-18 DIAGNOSIS — R42 VERTIGO: Primary | ICD-10-CM

## 2021-01-18 LAB
ALBUMIN SERPL-MCNC: 4.1 G/DL (ref 3.5–5.2)
ALBUMIN/GLOB SERPL: 1.5 G/DL
ALP SERPL-CCNC: 98 U/L (ref 39–117)
ALT SERPL W P-5'-P-CCNC: 16 U/L (ref 1–33)
ANION GAP SERPL CALCULATED.3IONS-SCNC: 13.2 MMOL/L (ref 5–15)
AST SERPL-CCNC: 15 U/L (ref 1–32)
B-OH-BUTYR SERPL-SCNC: 0.18 MMOL/L (ref 0.02–0.27)
BACTERIA UR QL AUTO: ABNORMAL /HPF
BASOPHILS # BLD AUTO: 0.02 10*3/MM3 (ref 0–0.2)
BASOPHILS NFR BLD AUTO: 0.3 % (ref 0–1.5)
BILIRUB SERPL-MCNC: 0.3 MG/DL (ref 0–1.2)
BILIRUB UR QL STRIP: NEGATIVE
BUN SERPL-MCNC: 15 MG/DL (ref 8–23)
BUN/CREAT SERPL: 22.7 (ref 7–25)
CALCIUM SPEC-SCNC: 9.4 MG/DL (ref 8.6–10.5)
CHLORIDE SERPL-SCNC: 103 MMOL/L (ref 98–107)
CLARITY UR: CLEAR
CO2 SERPL-SCNC: 21.8 MMOL/L (ref 22–29)
COLOR UR: YELLOW
CREAT SERPL-MCNC: 0.66 MG/DL (ref 0.57–1)
DEPRECATED RDW RBC AUTO: 40.9 FL (ref 37–54)
EOSINOPHIL # BLD AUTO: 0.06 10*3/MM3 (ref 0–0.4)
EOSINOPHIL NFR BLD AUTO: 0.8 % (ref 0.3–6.2)
ERYTHROCYTE [DISTWIDTH] IN BLOOD BY AUTOMATED COUNT: 13.5 % (ref 12.3–15.4)
GFR SERPL CREATININE-BSD FRML MDRD: 88 ML/MIN/1.73
GLOBULIN UR ELPH-MCNC: 2.8 GM/DL
GLUCOSE BLDC GLUCOMTR-MCNC: 100 MG/DL (ref 70–130)
GLUCOSE BLDC GLUCOMTR-MCNC: 114 MG/DL (ref 70–130)
GLUCOSE SERPL-MCNC: 129 MG/DL (ref 65–99)
GLUCOSE UR STRIP-MCNC: NEGATIVE MG/DL
HCT VFR BLD AUTO: 40.7 % (ref 34–46.6)
HGB BLD-MCNC: 13.5 G/DL (ref 12–15.9)
HGB UR QL STRIP.AUTO: ABNORMAL
HOLD SPECIMEN: NORMAL
HOLD SPECIMEN: NORMAL
HYALINE CASTS UR QL AUTO: ABNORMAL /LPF
IMM GRANULOCYTES # BLD AUTO: 0.02 10*3/MM3 (ref 0–0.05)
IMM GRANULOCYTES NFR BLD AUTO: 0.3 % (ref 0–0.5)
KETONES UR QL STRIP: NEGATIVE
LEUKOCYTE ESTERASE UR QL STRIP.AUTO: NEGATIVE
LYMPHOCYTES # BLD AUTO: 2.08 10*3/MM3 (ref 0.7–3.1)
LYMPHOCYTES NFR BLD AUTO: 27.8 % (ref 19.6–45.3)
MCH RBC QN AUTO: 27.7 PG (ref 26.6–33)
MCHC RBC AUTO-ENTMCNC: 33.2 G/DL (ref 31.5–35.7)
MCV RBC AUTO: 83.4 FL (ref 79–97)
MONOCYTES # BLD AUTO: 0.68 10*3/MM3 (ref 0.1–0.9)
MONOCYTES NFR BLD AUTO: 9.1 % (ref 5–12)
NEUTROPHILS NFR BLD AUTO: 4.63 10*3/MM3 (ref 1.7–7)
NEUTROPHILS NFR BLD AUTO: 61.7 % (ref 42.7–76)
NITRITE UR QL STRIP: NEGATIVE
NRBC BLD AUTO-RTO: 0.1 /100 WBC (ref 0–0.2)
PH UR STRIP.AUTO: 7 [PH] (ref 5–8)
PLATELET # BLD AUTO: 247 10*3/MM3 (ref 140–450)
PMV BLD AUTO: 8.8 FL (ref 6–12)
POTASSIUM SERPL-SCNC: 4.4 MMOL/L (ref 3.5–5.2)
PROT SERPL-MCNC: 6.9 G/DL (ref 6–8.5)
PROT UR QL STRIP: NEGATIVE
RBC # BLD AUTO: 4.88 10*6/MM3 (ref 3.77–5.28)
RBC # UR: ABNORMAL /HPF
REF LAB TEST METHOD: ABNORMAL
SODIUM SERPL-SCNC: 138 MMOL/L (ref 136–145)
SP GR UR STRIP: 1.01 (ref 1–1.03)
SQUAMOUS #/AREA URNS HPF: ABNORMAL /HPF
UROBILINOGEN UR QL STRIP: ABNORMAL
WBC # BLD AUTO: 7.49 10*3/MM3 (ref 3.4–10.8)
WBC UR QL AUTO: ABNORMAL /HPF
WHOLE BLOOD HOLD SPECIMEN: NORMAL
WHOLE BLOOD HOLD SPECIMEN: NORMAL

## 2021-01-18 PROCEDURE — 80053 COMPREHEN METABOLIC PANEL: CPT

## 2021-01-18 PROCEDURE — 82962 GLUCOSE BLOOD TEST: CPT

## 2021-01-18 PROCEDURE — 99283 EMERGENCY DEPT VISIT LOW MDM: CPT

## 2021-01-18 PROCEDURE — 85025 COMPLETE CBC W/AUTO DIFF WBC: CPT

## 2021-01-18 PROCEDURE — 81001 URINALYSIS AUTO W/SCOPE: CPT | Performed by: EMERGENCY MEDICINE

## 2021-01-18 PROCEDURE — 36415 COLL VENOUS BLD VENIPUNCTURE: CPT

## 2021-01-18 PROCEDURE — 82010 KETONE BODYS QUAN: CPT

## 2021-01-18 PROCEDURE — 70450 CT HEAD/BRAIN W/O DYE: CPT

## 2021-01-18 RX ORDER — MECLIZINE HYDROCHLORIDE 25 MG/1
25 TABLET ORAL ONCE
Status: COMPLETED | OUTPATIENT
Start: 2021-01-18 | End: 2021-01-18

## 2021-01-18 RX ORDER — MECLIZINE HYDROCHLORIDE 25 MG/1
25 TABLET ORAL 3 TIMES DAILY PRN
Qty: 15 TABLET | Refills: 0 | Status: SHIPPED | OUTPATIENT
Start: 2021-01-18 | End: 2021-01-23

## 2021-01-18 RX ORDER — SODIUM CHLORIDE 0.9 % (FLUSH) 0.9 %
10 SYRINGE (ML) INJECTION AS NEEDED
Status: DISCONTINUED | OUTPATIENT
Start: 2021-01-18 | End: 2021-01-19 | Stop reason: HOSPADM

## 2021-01-18 RX ADMIN — SODIUM CHLORIDE 1000 ML: 9 INJECTION, SOLUTION INTRAVENOUS at 18:58

## 2021-01-18 RX ADMIN — MECLIZINE HYDROCHLORIDE 25 MG: 25 TABLET ORAL at 19:30

## 2021-01-18 NOTE — ED PROVIDER NOTES
"EMERGENCY DEPARTMENT ENCOUNTER    Room Number:  05/05  Date seen:  1/23/2021  Time seen: 18:07 EST  PCP: Bill Hwang Jr., MD  Historian: Patient    HPI:  Chief complaint: Hyperglycemia  A complete HPI/ROS/PMH/PSH/SH/FH are unobtainable due to: None  Context:Olinda Baumann is a 75 y.o. female, who presents to the ED with c/o feeling dizzy when she got up this morning describes it as room spinning.  When she measured her blood sugar at the time she said it was 415.  She called her PCP who advised her to go to the ER for further evaluation.  Patient has a history of diabetes and takes glipizide in the morning and Metformin at night.  Patient denies nausea, vomiting, abdominal pain.  Patient took her 5 mg of glipizide prior to arrival with a blood sugar of 120s in the emergency room.  In addition, she is complaining of feeling \"off balance \"intermittently since yesterday morning.  Denies headache or altered mental status.    Patient was placed in face mask in first look. Patient was wearing facemask when I entered the room and throughout our encounter. I wore full protective equipment throughout this patient encounter including a face mask, goggles, and gloves. Hand hygiene was performed before donning protective equipment and after removal when leaving the room.      MEDICAL RECORD REVIEW      ALLERGIES  Sulfa antibiotics    PAST MEDICAL HISTORY  Active Ambulatory Problems     Diagnosis Date Noted   • Encounter for screening for malignant neoplasm of colon 03/06/2017   • Chronic diarrhea 02/14/2019   • Irritable bowel syndrome with diarrhea 02/25/2020   • Chronic cough 02/25/2020   • Endometrial polyp 07/30/2020     Resolved Ambulatory Problems     Diagnosis Date Noted   • No Resolved Ambulatory Problems     Past Medical History:   Diagnosis Date   • Ankle fracture 2001   • Arthritis    • Asthma    • Breast cyst    • Cancer of skin of right leg    • Diabetes mellitus (CMS/HCC)    • Fibrocystic breast    • GERD " (gastroesophageal reflux disease)    • Goiter    • History of colon polyps    • Hyperlipidemia    • Hypertension    • Seasonal allergies    • Urine frequency    • Uterine polyp        PAST SURGICAL HISTORY  Past Surgical History:   Procedure Laterality Date   • ANKLE SURGERY Left     WITH METAL   • BREAST CYST EXCISION Left     about 20 years ago   • CATARACT EXTRACTION EXTRACAPSULAR W/ INTRAOCULAR LENS IMPLANTATION     • COLONOSCOPY N/A 3/6/2017    TA polyps, IH   • COLONOSCOPY N/A 2/27/2019    Procedure: COLONOSCOPY TO CECUM WITH COLD BIOPSIES;  Surgeon: Marquez Alan MD;  Location: [facility] ENDOSCOPY;  Service: Gastroenterology   • D&C HYSTEROSCOPY N/A 10/14/2020    Procedure: DILATATION AND CURETTAGE HYSTEROSCOPY WITH MYOSURE;  Surgeon: Mariano Calix MD;  Location: [facility] [place] [facility];  Service: Obstetrics/Gynecology;  Laterality: N/A;   • FRACTURE SURGERY Left     ANKLE   • MOUTH SURGERY     • SKIN CANCER EXCISION Right     LEG       FAMILY HISTORY  Family History   Problem Relation Age of Onset   • GI problems Mother         ileostomy   • Malig Hyperthermia Neg Hx        SOCIAL HISTORY  Social History     Socioeconomic History   • Marital status:      Spouse name: Not on file   • Number of children: Not on file   • Years of education: Not on file   • Highest education level: Not on file   Tobacco Use   • Smoking status: Never Smoker   • Smokeless tobacco: Never Used   Substance and Sexual Activity   • Alcohol use: Never     Comment: RARELY   • Drug use: Never   • Sexual activity: Defer       REVIEW OF SYSTEMS  Review of Systems    All systems reviewed and negative except for those discussed in HPI.     PHYSICAL EXAM    ED Triage Vitals   Temp Heart Rate Resp BP SpO2   01/18/21 1506 01/18/21 1506 01/18/21 1506 01/18/21 1510 01/18/21 1506   97.7 °F (36.5 °C) (!) 121 18 144/99 95 %      Temp src Heart Rate Source Patient Position BP Location FiO2 (%)   01/18/21 1506 -- -- -- --   Tympanic         Physical  Exam    I have reviewed the triage vital signs and nursing notes.      GENERAL: not distressed  HENT: nares patent  EYES: no scleral icterus, mild horizontal nystagmus  NECK: no ROM limitations  CV: regular rhythm, regular rate  RESPIRATORY: normal effort  ABDOMEN: soft, clear to auscultation bilaterally  : deferred  MUSCULOSKELETAL: no deformity  NEURO: alert, moves all extremities, follows commands  SKIN: warm, dry    LAB RESULTS  No results found for this or any previous visit (from the past 24 hour(s)).      RADIOLOGY RESULTS  CT Head Without Contrast   Final Result     1. No acute intracranial abnormality.            Electronically signed by Timur Rosales MD on 01-18-21 at 2205            PROGRESS, DATA ANALYSIS, CONSULTS AND MEDICAL DECISION MAKING  All labs have been independently reviewed by me.  All radiology studies have been reviewed by me and discussed with radiologist dictating the report. Discussion below represents my analysis of pertinent findings related to patient's condition, differential diagnosis, treatment plan and final disposition.     ED Course as of Jan 23 1747   Mon Jan 18, 2021   1808 Beta-Hydroxybutyrate Quant: 0.177 [SS]   1808 Glucose(!): 129 [SS]   1808 Anion Gap: 13.2 [SS]   2000 I rechecked patient informed her of her unremarkable CT head results.  She reports feeling significantly better after the meclizine given in the emergency room.  Patient's lab work are unremarkable, her symptoms sound like vertigo especially with relief with the meclizine.  Will prescribe her meclizine to go home with and advised to follow-up with PCP.    [SS]      ED Course User Index  [SS] Magdalena Sosa PA-C       The differential diagnosis include but are not limited to vertigo, intracranial hemorrhage, hypoglycemia, diabetes.       Reviewed pt's history and workup with Dr. Yan.  After a bedside evaluation, Dr. Yan agrees with the plan of care.    (FOR DISCHARGE)The patient's history,  "physical exam, and lab findings were discussed with the physician, who also performed a face to face history and physical exam.  I discussed all results and noted any abnormalities with patient.  Discussed absoute need to recheck abnormalities with their family physician.  I answered any of the patient's questions.  Discussed plan for discharge, as there is no emergent indication for admission.  Pt is agreeable and understands need for follow up and repeat testing.  Pt is aware that discharge does not mean that nothing is wrong but it indicates no emergency is present and they must continue care with their family physician.  Pt is discharged with instructions to follow up with primary care doctor to have their blood pressure rechecked.           Disposition vitals:  /99   Pulse (!) 121   Temp 97.7 °F (36.5 °C) (Tympanic)   Resp 18   Ht 157.5 cm (62\")   Wt 78 kg (172 lb)   SpO2 95%   BMI 31.46 kg/m²       DIAGNOSIS  Final diagnoses:   Vertigo       FOLLOW UP   Bill Hwang Jr., MD  [address] [id number] [phone]    Call in 2 days  For close follow-up    [facility] Emergency Department  [address] [facility] [address] [phone]    As needed, If symptoms worsen         Magdalena Sosa PA-C  01/19/21 0122       Cullen Yan MD  01/23/21 1747    "

## 2021-01-18 NOTE — ED TRIAGE NOTES
Patient reports that she was feeling weak today and that she checked her blood sugar and it was in the 400's. Patient also states that she spoke with Dr. Hwang before she came and he told her to come here.   Patient has on her mask and triage staff have on proper ppe.

## 2021-01-19 NOTE — DISCHARGE INSTRUCTIONS
When moving positions, moves slowly.  Take prescription as prescribed.  Follow-up with your primary care doctor if your symptoms continue.

## 2021-01-19 NOTE — ED NOTES
Pt alert and oriented x 4. Educated on discharge instructions. Pt verbalized understanding. Ambulatory with steady gait. Pt in no distress.       Jennifer Wise RN  01/18/21 8705

## 2021-01-19 NOTE — ED NOTES
Pt ambulated to bathroom with stand by assist from this nurse. Pt's ambulatory with steady gait. Pt had no concerns or complaints.      Jennifer Wise RN  01/18/21 2019

## 2021-01-19 NOTE — ED PROVIDER NOTES
MD ATTESTATION NOTE    The TOMEKA and I have discussed this patient's history, physical exam, and treatment plan.  I have reviewed the documentation and personally had a face to face interaction with the patient. I affirm the documentation and agree with the treatment and plan.  The attached note describes my personal findings.      Olinda Baumann is a 75 y.o. female who presents to the ED c/o dizziness and hyperglycemia.  Patient states that for the past couple days she has been feeling dizzy.  She describes it as a rotational movement that makes her feel unsteady.  No nausea no feeling that she will faint.  Patient states she sometimes feels this way when her blood sugar gets too low.  Patient states that at 1:00 this afternoon she checked her blood sugar and it was in the 400s.  She got touch with her primary care doctor who told her to come to the ER for further evaluation.  Patient states that she is feeling better and her symptoms almost completely gone away at this time.  Her blood sugar is normal on Accu-Chek in the ED.      On exam:  No acute distress, alert and oriented x3, normal cephalic atraumatic, PERRLA no provokable horizontal nystagmus, regular rate and rhythm, clear to auscultation bilaterally, soft nontender nondistended, neuro intact no difficulty with coordination    Labs  No results found for this or any previous visit (from the past 24 hour(s)).    Radiology  No Radiology Exams Resulted Within Past 24 Hours    Medical Decision Making:  ED Course as of Jan 23 1750   Mon Jan 18, 2021   1808 Beta-Hydroxybutyrate Quant: 0.177 [SS]   1808 Glucose(!): 129 [SS]   1808 Anion Gap: 13.2 [SS]   2000 I rechecked patient informed her of her unremarkable CT head results.  She reports feeling significantly better after the meclizine given in the emergency room.  Patient's lab work are unremarkable, her symptoms sound like vertigo especially with relief with the meclizine.  Will prescribe her meclizine to go  home with and advised to follow-up with PCP.    [SS]      ED Course User Index  [SS] Magdalena Sosa PA-C           PPE: Both the patient and I wore a surgical mask throughout the entire patient encounter. I wore protective goggles.     Diagnosis  Final diagnoses:   Vertigo        Cullen Yan MD  01/18/21 8453       Cullen Yan MD  01/23/21 1294

## 2021-02-01 RX ORDER — PANTOPRAZOLE SODIUM 40 MG/1
40 TABLET, DELAYED RELEASE ORAL DAILY
Qty: 90 TABLET | Refills: 3 | Status: SHIPPED | OUTPATIENT
Start: 2021-02-01 | End: 2022-05-03

## 2021-02-26 ENCOUNTER — OFFICE VISIT (OUTPATIENT)
Dept: ORTHOPEDIC SURGERY | Facility: CLINIC | Age: 76
End: 2021-02-26

## 2021-02-26 VITALS — BODY MASS INDEX: 31.65 KG/M2 | HEIGHT: 62 IN | TEMPERATURE: 97.2 F | WEIGHT: 172 LBS

## 2021-02-26 DIAGNOSIS — R52 PAIN: Primary | ICD-10-CM

## 2021-02-26 DIAGNOSIS — M25.511 CHRONIC RIGHT SHOULDER PAIN: ICD-10-CM

## 2021-02-26 DIAGNOSIS — G89.29 CHRONIC RIGHT SHOULDER PAIN: ICD-10-CM

## 2021-02-26 PROCEDURE — 20610 DRAIN/INJ JOINT/BURSA W/O US: CPT | Performed by: ORTHOPAEDIC SURGERY

## 2021-02-26 PROCEDURE — 73030 X-RAY EXAM OF SHOULDER: CPT | Performed by: ORTHOPAEDIC SURGERY

## 2021-02-26 PROCEDURE — 99203 OFFICE O/P NEW LOW 30 MIN: CPT | Performed by: ORTHOPAEDIC SURGERY

## 2021-02-26 RX ORDER — METHYLPREDNISOLONE ACETATE 80 MG/ML
80 INJECTION, SUSPENSION INTRA-ARTICULAR; INTRALESIONAL; INTRAMUSCULAR; SOFT TISSUE
Status: COMPLETED | OUTPATIENT
Start: 2021-02-26 | End: 2021-02-26

## 2021-02-26 RX ADMIN — METHYLPREDNISOLONE ACETATE 80 MG: 80 INJECTION, SUSPENSION INTRA-ARTICULAR; INTRALESIONAL; INTRAMUSCULAR; SOFT TISSUE at 10:58

## 2021-02-26 NOTE — PROGRESS NOTES
"  Patient: Olinda Baumann    YOB: 1945    Medical Record Number: [id number]    Chief Complaints:  Right shoulder pain    History of Present Illness:     75 y.o. female patient who presents with a complaint of right shoulder pain and weakness.  She reports that the symptoms first started several months ago without any discrete injury.  She thought that she \"may have strained it somehow\".  Her current pain is moderate, constant and aching.  The pain is worse with certain reaching and lifting movements as well as at night.  She denies any alleviating factors.  She denies any shooting pain down the arm, distal weakness, numbness or paresthesias.    Allergies:   Allergies   Allergen Reactions   • Sulfa Antibiotics Other (See Comments)     Does not remember reaction       Home Medications:    Current Outpatient Medications:   •  albuterol sulfate  (90 Base) MCG/ACT inhaler, Inhale 2 puffs Every 4 (Four) Hours As Needed for Wheezing., Disp: , Rfl:   •  aspirin 81 MG EC tablet, Take 81 mg by mouth Daily. HELD, Disp: , Rfl:   •  budesonide-formoterol (SYMBICORT) 160-4.5 MCG/ACT inhaler, Inhale 2 puffs 2 (two) times a day., Disp: , Rfl:   •  glipiZIDE (GLUCOTROL) 5 MG ER tablet, Take 5 mg by mouth Daily., Disp: , Rfl:   •  levocetirizine (XYZAL) 5 MG tablet, Take 5 mg by mouth Every Evening., Disp: , Rfl:   •  metFORMIN (GLUCOPHAGE) 500 MG tablet, Take 500 mg by mouth every night at bedtime., Disp: , Rfl:   •  montelukast (SINGULAIR) 10 MG tablet, Take 10 mg by mouth Every Night., Disp: , Rfl:   •  nortriptyline (PAMELOR) 10 MG capsule, Take 1 capsule by mouth Every Night. (Patient taking differently: Take 10 mg by mouth At Night As Needed.), Disp: 90 capsule, Rfl: 3  •  simvastatin (ZOCOR) 10 MG tablet, Take 10 mg by mouth Every Night., Disp: , Rfl:   •  Chlorhexidine Gluconate Cloth 2 % pads, Apply  topically Take As Directed., Disp: , Rfl:   •  lisinopril (PRINIVIL,ZESTRIL) 5 MG tablet, Take 5 mg by " mouth Every Night., Disp: , Rfl:   •  pantoprazole (PROTONIX) 40 MG EC tablet, TAKE 1 TABLET BY MOUTH  DAILY, Disp: 90 tablet, Rfl: 3  •  SPIRIVA RESPIMAT 1.25 MCG/ACT aerosol solution inhaler, 2 puffs Daily., Disp: , Rfl:     Past Medical History:   Diagnosis Date   • Ankle fracture 2001   • Arthritis    • Asthma    • Breast cyst    • Cancer of skin of right leg    • Diabetes mellitus (CMS/HCC)    • Fibrocystic breast    • GERD (gastroesophageal reflux disease)    • Goiter     INWARD GOITER   • History of colon polyps    • Hyperlipidemia    • Hypertension    • Seasonal allergies    • Urine frequency    • Uterine polyp        Past Surgical History:   Procedure Laterality Date   • ANKLE SURGERY Left     WITH METAL   • BREAST CYST EXCISION Left     about 20 years ago   • CATARACT EXTRACTION EXTRACAPSULAR W/ INTRAOCULAR LENS IMPLANTATION     • COLONOSCOPY N/A 3/6/2017    TA polyps, IH   • COLONOSCOPY N/A 2/27/2019    Procedure: COLONOSCOPY TO CECUM WITH COLD BIOPSIES;  Surgeon: Marquez Alan MD;  Location: [facility] ENDOSCOPY;  Service: Gastroenterology   • D&C HYSTEROSCOPY N/A 10/14/2020    Procedure: DILATATION AND CURETTAGE HYSTEROSCOPY WITH MYOSURE;  Surgeon: Mariano Calix MD;  Location: [facility] OR [facility];  Service: Obstetrics/Gynecology;  Laterality: N/A;   • FRACTURE SURGERY Left     ANKLE   • MOUTH SURGERY     • SKIN CANCER EXCISION Right     LEG       Social History     Occupational History   • Not on file   Tobacco Use   • Smoking status: Never Smoker   • Smokeless tobacco: Never Used   Substance and Sexual Activity   • Alcohol use: Never     Comment: RARELY   • Drug use: Never   • Sexual activity: Defer      Social History     Social History Narrative   • Not on file       Family History   Problem Relation Age of Onset   • GI problems Mother         ileostomy   • Malig Hyperthermia Neg Hx        Review of Systems:      Constitutional: Denies fever, shaking or chills   Eyes: Denies change in visual acuity  "  HEENT: Denies nasal congestion or sore throat   Respiratory: Denies cough or shortness of breath   Cardiovascular: Denies chest pain or edema  Endocrine: Denies tremors, palpitations, intolerance of heat or cold, polyuria, polydipsia.  GI: Denies abdominal pain, nausea, vomiting, bloody stools or diarrhea  : Denies frequency, urgency, incontinence, retention, or nocturia.  Musculoskeletal: Denies numbness, tingling or loss of motor function except as above  Integument: Denies rash, lesion or ulceration   Neurologic: Denies headache or focal weakness, deficits  Heme: Denies spontaneous or excessive bleeding, epistaxis, hematuria, melena, fatigue, enlarged or tender lymph nodes.      All other pertinent positives and negatives as noted above in HPI.    Physical Exam:   75 y.o. female    Vitals:    02/26/21 1036   Temp: 97.2 °F (36.2 °C)   Weight: 78 kg (172 lb)   Height: 157.5 cm (62\")     General:  Patient is awake and alert.  Appears in no acute distress or discomfort.    Psych:  Affect and demeanor are appropriate.    Eyes:  Conjunctiva and sclera appear grossly normal.  Eyes track well and EOM seem to be intact.    Ears:  No gross abnormalities.  Hearing adequate for the exam.    Cardiovascular:  Regular rate and rhythm.    Lungs:  Good chest expansion.  Breathing unlabored.    Lymph:  No palpable adenopathy about neck or axilla.    Neck:  Supple.  Normal ROM.  Negative Spurling's for shoulder or arm pain.    Right upper extremity:  Skin is benign.  No gross abnormalities on inspection including any atrophy, swellings, or masses.  No palpable masses or adenopathy.  No focal areas of significant tenderness.  Full shoulder motion.  No evident instability or apprehension.  Mildly positive Neer Lechuga.  Weakness with forward elevation in the scapular plane.  Good strength with internal and external rotation.  Good strength in the deltoid, biceps, triceps, and .  Intact sensation throughout the arm.  Brisk " cap refill.  Palpable radial pulse.  Good skin turgor.         Radiology:   AP, scapular Y, and axillary views of the right shoulder are ordered by myself and reviewed to evaluate the patient's complaint.  No comparison films are immediately available.  The x-rays show no obvious acute abnormalities, lesions, masses, significant degenerative changes, or other concerning findings.  The acromiohumeral interval is normal.  Glenoid version appears normal as well.    Assessment/Plan:   Right rotator cuff tear    We discussed the natural history of rotator cuff tears and risk of potential tear progression.  We thoroughly discussed options in detail including a trial of conservative treatment versus further work-up and potential surgical options. She has acknowledged understanding of this information.  The patient would prefer to pursue nonsurgical management at this time.  The risk, benefits and alternatives to an injection were thoroughly discussed.  She consented and the injection was performed as described below.  I offered to refer her to therapy as well but she declined that offer for now.  She will follow-up with me as needed going forward.    Thony Sams MD    02/26/2021    CC to Bill Hwang Jr., MD    Large Joint Arthrocentesis: R subacromial bursa  Date/Time: 2/26/2021 10:58 AM  Consent given by: patient  Site marked: site marked  Timeout: Immediately prior to procedure a time out was called to verify the correct patient, procedure, equipment, support staff and site/side marked as required   Supporting Documentation  Indications: pain   Procedure Details  Location: shoulder - R subacromial bursa  Preparation: Patient was prepped and draped in the usual sterile fashion  Needle gauge: 21G.  Approach: posterior  Medications administered: 2 mL lidocaine (cardiac); 80 mg methylPREDNISolone acetate 80 MG/ML  Patient tolerance: patient tolerated the procedure well with no immediate complications

## 2021-03-03 DIAGNOSIS — Z23 IMMUNIZATION DUE: ICD-10-CM

## 2022-03-11 ENCOUNTER — TELEPHONE (OUTPATIENT)
Dept: ORTHOPEDIC SURGERY | Facility: CLINIC | Age: 77
End: 2022-03-11

## 2022-03-11 NOTE — TELEPHONE ENCOUNTER
Caller: URBANO SYED    Relationship to patient: SELF    Best call back number:  [phone]    Chief complaint:  PATIENT WANTS F/U APPT. FOR RIGHT SHOULDER INJECTION.    Type of visit:  F/U/INJ    Additional notes: PATIENT WANTS [facility] OFFICE

## 2022-03-18 ENCOUNTER — OFFICE VISIT (OUTPATIENT)
Dept: ORTHOPEDIC SURGERY | Facility: CLINIC | Age: 77
End: 2022-03-18

## 2022-03-18 VITALS — BODY MASS INDEX: 31.65 KG/M2 | HEIGHT: 62 IN | TEMPERATURE: 97.3 F | WEIGHT: 172 LBS

## 2022-03-18 DIAGNOSIS — M25.511 RIGHT SHOULDER PAIN, UNSPECIFIED CHRONICITY: Primary | ICD-10-CM

## 2022-03-18 PROCEDURE — 99213 OFFICE O/P EST LOW 20 MIN: CPT | Performed by: ORTHOPAEDIC SURGERY

## 2022-03-18 PROCEDURE — 20610 DRAIN/INJ JOINT/BURSA W/O US: CPT | Performed by: ORTHOPAEDIC SURGERY

## 2022-03-18 PROCEDURE — 73030 X-RAY EXAM OF SHOULDER: CPT | Performed by: ORTHOPAEDIC SURGERY

## 2022-03-18 RX ORDER — METHYLPREDNISOLONE ACETATE 80 MG/ML
80 INJECTION, SUSPENSION INTRA-ARTICULAR; INTRALESIONAL; INTRAMUSCULAR; SOFT TISSUE
Status: COMPLETED | OUTPATIENT
Start: 2022-03-18 | End: 2022-03-18

## 2022-03-18 RX ADMIN — METHYLPREDNISOLONE ACETATE 80 MG: 80 INJECTION, SUSPENSION INTRA-ARTICULAR; INTRALESIONAL; INTRAMUSCULAR; SOFT TISSUE at 08:35

## 2022-03-18 NOTE — PROGRESS NOTES
Chief complaint: Worsening right shoulder pain    Ms. Rios follows up for her right shoulder.  I saw her for this issue a little over a year ago.  She reports the injection helped tremendously.  Her pain was gone for a period of about 6 to 9 months.  Her pain is gradually worsened.  She reports pain and weakness with overhead activity.  She comes in today to discuss further options.    Her right shoulder is examined.  Skin is benign.  No atrophy.  Full motion.  Again she has positive impingement maneuvers.  She has weakness with forward elevation in the scapular plane.  External rotation is uncomfortable but her strength is still good.    AP, scapular Y and axial views right shoulder are ordered and reviewed to evaluate her complaint.  These are compared to previous x-rays.  No new findings.    Assessment: Right rotator cuff tear    Plan: We discussed further work-up with an MRI.  She was initially leaning towards that but ultimately decided against that once we discussed the potential surgery.  She says that she would prefer to try another injection before moving forward with any further work-up or surgery.  The risk, benefits and alternatives to the injection were discussed.  She consented and the injection was performed as described below.  She will follow up as needed.    Large Joint Arthrocentesis: R subacromial bursa  Date/Time: 3/18/2022 8:35 AM  Consent given by: patient  Site marked: site marked  Timeout: Immediately prior to procedure a time out was called to verify the correct patient, procedure, equipment, support staff and site/side marked as required   Supporting Documentation  Indications: pain and joint swelling   Procedure Details  Location: shoulder - R subacromial bursa  Preparation: Patient was prepped and draped in the usual sterile fashion  Needle gauge: 21g.  Approach: anterolateral  Medications administered: 80 mg methylPREDNISolone acetate 80 MG/ML; 2 mL lidocaine (cardiac)  Patient  tolerance: patient tolerated the procedure well with no immediate complications

## 2022-03-23 ENCOUNTER — TRANSCRIBE ORDERS (OUTPATIENT)
Dept: ADMINISTRATIVE | Facility: HOSPITAL | Age: 77
End: 2022-03-23

## 2022-03-23 DIAGNOSIS — Z12.31 VISIT FOR SCREENING MAMMOGRAM: Primary | ICD-10-CM

## 2022-03-23 DIAGNOSIS — R91.8 LUNG NODULES: Primary | ICD-10-CM

## 2022-04-12 ENCOUNTER — HOSPITAL ENCOUNTER (OUTPATIENT)
Dept: CT IMAGING | Facility: HOSPITAL | Age: 77
Discharge: HOME | End: 2022-04-12

## 2022-04-12 ENCOUNTER — HOSPITAL ENCOUNTER (OUTPATIENT)
Dept: MAMMOGRAPHY | Facility: HOSPITAL | Age: 77
Discharge: HOME | End: 2022-04-12

## 2022-04-12 DIAGNOSIS — R91.8 LUNG NODULES: ICD-10-CM

## 2022-04-12 DIAGNOSIS — Z12.31 VISIT FOR SCREENING MAMMOGRAM: ICD-10-CM

## 2022-04-12 PROCEDURE — 77063 BREAST TOMOSYNTHESIS BI: CPT

## 2022-04-12 PROCEDURE — 71250 CT THORAX DX C-: CPT

## 2022-04-12 PROCEDURE — 77067 SCR MAMMO BI INCL CAD: CPT

## 2022-04-13 ENCOUNTER — TRANSCRIBE ORDERS (OUTPATIENT)
Dept: ADMINISTRATIVE | Facility: HOSPITAL | Age: 77
End: 2022-04-13

## 2022-04-13 DIAGNOSIS — R92.8 ABNORMAL MAMMOGRAM: Primary | ICD-10-CM

## 2022-05-03 ENCOUNTER — TELEPHONE (OUTPATIENT)
Dept: GASTROENTEROLOGY | Facility: CLINIC | Age: 77
End: 2022-05-03

## 2022-05-03 ENCOUNTER — OFFICE VISIT (OUTPATIENT)
Dept: GASTROENTEROLOGY | Facility: CLINIC | Age: 77
End: 2022-05-03

## 2022-05-03 VITALS
SYSTOLIC BLOOD PRESSURE: 168 MMHG | HEIGHT: 62 IN | WEIGHT: 169.8 LBS | HEART RATE: 88 BPM | TEMPERATURE: 97.8 F | BODY MASS INDEX: 31.25 KG/M2 | DIASTOLIC BLOOD PRESSURE: 90 MMHG

## 2022-05-03 DIAGNOSIS — R05.3 CHRONIC COUGH: Primary | ICD-10-CM

## 2022-05-03 DIAGNOSIS — K58.0 IRRITABLE BOWEL SYNDROME WITH DIARRHEA: ICD-10-CM

## 2022-05-03 PROCEDURE — 99213 OFFICE O/P EST LOW 20 MIN: CPT | Performed by: INTERNAL MEDICINE

## 2022-05-03 RX ORDER — FEXOFENADINE HCL AND PSEUDOEPHEDRINE HCI 180; 240 MG/1; MG/1
1 TABLET, EXTENDED RELEASE ORAL AS NEEDED
COMMUNITY

## 2022-05-03 RX ORDER — NORTRIPTYLINE HYDROCHLORIDE 10 MG/1
10 CAPSULE ORAL NIGHTLY PRN
Qty: 90 CAPSULE | Refills: 3 | Status: SHIPPED | OUTPATIENT
Start: 2022-05-03

## 2022-05-03 RX ORDER — FLUTICASONE PROPIONATE 50 MCG
2 SPRAY, SUSPENSION (ML) NASAL AS NEEDED
COMMUNITY

## 2022-05-03 NOTE — TELEPHONE ENCOUNTER
spoke with pt scheduled at [facility] on ---june 8--------arrive at -----200 pm-----blaze----edmar----EGSUSAN  instructional packet handed to pt in office

## 2022-05-03 NOTE — PROGRESS NOTES
Chief Complaint   Patient presents with   • Cough   • Difficulty Swallowing       Olinda Baumann is a  77 y.o. female here for a follow up visit for chronic cough.    HPI     Patient 77-year-old female with history of hypertension, hyperlipidemia, diabetes presenting with chronic cough.  Patient actually denies any significant swallowing issues.  Patient seen 2 years ago for the same has tried PPI without changing her symptoms.  Patient reports evaluation by pulmonary and allergy were negative other than allergy to mold but they do not feel that this is consistent with patient's chronic cough and want to rule out reflux.  Patient denies nausea vomiting no heartburn no shortness of breath.  Patient here for further recommendations.    Past Medical History:   Diagnosis Date   • Ankle fracture 2001   • Arthritis    • Asthma    • Breast cyst    • Cancer of skin of right leg    • Diabetes mellitus (HCC)    • Fibrocystic breast    • GERD (gastroesophageal reflux disease)    • Goiter     INWARD GOITER   • History of colon polyps    • Hyperlipidemia    • Hypertension    • Seasonal allergies    • Urine frequency    • Uterine polyp          Current Outpatient Medications:   •  albuterol sulfate  (90 Base) MCG/ACT inhaler, Inhale 2 puffs Every 4 (Four) Hours As Needed for Wheezing., Disp: , Rfl:   •  aspirin 81 MG EC tablet, Take 81 mg by mouth Daily. HELD, Disp: , Rfl:   •  budesonide-formoterol (SYMBICORT) 160-4.5 MCG/ACT inhaler, Inhale 2 puffs 2 (two) times a day., Disp: , Rfl:   •  fexofenadine-pseudoephedrine (ALLEGRA-D 24) 180-240 MG per 24 hr tablet, Take 1 tablet by mouth As Needed for Allergies., Disp: , Rfl:   •  fluticasone (FLONASE) 50 MCG/ACT nasal spray, 2 sprays into the nostril(s) as directed by provider As Needed for Rhinitis., Disp: , Rfl:   •  glipiZIDE (GLUCOTROL) 5 MG ER tablet, Take 5 mg by mouth Daily., Disp: , Rfl:   •  levocetirizine (XYZAL) 5 MG tablet, Take 5 mg by mouth As Needed., Disp: ,  Rfl:   •  metFORMIN (GLUCOPHAGE) 500 MG tablet, Take 500 mg by mouth every night at bedtime., Disp: , Rfl:   •  montelukast (SINGULAIR) 10 MG tablet, Take 10 mg by mouth Every Night., Disp: , Rfl:   •  nortriptyline (PAMELOR) 10 MG capsule, Take 1 capsule by mouth At Night As Needed (Diarrhea)., Disp: 90 capsule, Rfl: 3  •  simvastatin (ZOCOR) 10 MG tablet, Take 10 mg by mouth Every Night., Disp: , Rfl:   •  Chlorhexidine Gluconate Cloth 2 % pads, Apply  topically Take As Directed., Disp: , Rfl:   •  lisinopril (PRINIVIL,ZESTRIL) 5 MG tablet, Take 5 mg by mouth Every Night., Disp: , Rfl:   •  SPIRIVA RESPIMAT 1.25 MCG/ACT aerosol solution inhaler, 2 puffs Daily., Disp: , Rfl:     Allergies   Allergen Reactions   • Sulfa Antibiotics Other (See Comments)     Does not remember reaction       Social History     Socioeconomic History   • Marital status:    Tobacco Use   • Smoking status: Never Smoker   • Smokeless tobacco: Never Used   Vaping Use   • Vaping Use: Never used   Substance and Sexual Activity   • Alcohol use: Never     Comment: RARELY   • Drug use: Never   • Sexual activity: Defer       Family History   Problem Relation Age of Onset   • GI problems Mother         ileostomy   • Malig Hyperthermia Neg Hx    • Breast cancer Neg Hx    • Ovarian cancer Neg Hx        Review of Systems   Constitutional: Negative.    Respiratory: Positive for cough. Negative for apnea, choking, chest tightness, shortness of breath, wheezing and stridor.    Cardiovascular: Negative.    Gastrointestinal: Negative.    Musculoskeletal: Negative.    Skin: Negative.    Hematological: Negative.        Vitals:    05/03/22 1023   BP: 168/90   Pulse: 88   Temp: 97.8 °F (36.6 °C)       Physical Exam  Vitals reviewed.   Constitutional:       Appearance: Normal appearance. She is well-developed.   HENT:      Head: Normocephalic and atraumatic.   Eyes:      General: No scleral icterus.     Pupils: Pupils are equal, round, and reactive to  light.   Cardiovascular:      Rate and Rhythm: Normal rate and regular rhythm.      Heart sounds: Normal heart sounds.   Pulmonary:      Effort: Pulmonary effort is normal. No respiratory distress.      Breath sounds: Normal breath sounds.   Abdominal:      General: Bowel sounds are normal. There is no distension.      Palpations: Abdomen is soft. There is no mass.      Tenderness: There is no abdominal tenderness.      Hernia: No hernia is present.   Skin:     General: Skin is warm and dry.      Coloration: Skin is not jaundiced.      Findings: No rash.   Neurological:      General: No focal deficit present.      Mental Status: She is alert and oriented to person, place, and time.      Cranial Nerves: No cranial nerve deficit.   Psychiatric:         Behavior: Behavior normal.         Thought Content: Thought content normal.         Judgment: Judgment normal.         No visits with results within 2 Month(s) from this visit.   Latest known visit with results is:   Admission on 01/18/2021, Discharged on 01/18/2021   Component Date Value Ref Range Status   • Glucose 01/18/2021 129 (A) 65 - 99 mg/dL Final   • BUN 01/18/2021 15  8 - 23 mg/dL Final   • Creatinine 01/18/2021 0.66  0.57 - 1.00 mg/dL Final   • Sodium 01/18/2021 138  136 - 145 mmol/L Final   • Potassium 01/18/2021 4.4  3.5 - 5.2 mmol/L Final   • Chloride 01/18/2021 103  98 - 107 mmol/L Final   • CO2 01/18/2021 21.8 (A) 22.0 - 29.0 mmol/L Final   • Calcium 01/18/2021 9.4  8.6 - 10.5 mg/dL Final   • Total Protein 01/18/2021 6.9  6.0 - 8.5 g/dL Final   • Albumin 01/18/2021 4.10  3.50 - 5.20 g/dL Final   • ALT (SGPT) 01/18/2021 16  1 - 33 U/L Final   • AST (SGOT) 01/18/2021 15  1 - 32 U/L Final   • Alkaline Phosphatase 01/18/2021 98  39 - 117 U/L Final   • Total Bilirubin 01/18/2021 0.3  0.0 - 1.2 mg/dL Final   • eGFR Non  Amer 01/18/2021 88  >60 mL/min/1.73 Final   • Globulin 01/18/2021 2.8  gm/dL Final   • A/G Ratio 01/18/2021 1.5  g/dL Final   •  BUN/Creatinine Ratio 01/18/2021 22.7  7.0 - 25.0 Final   • Anion Gap 01/18/2021 13.2  5.0 - 15.0 mmol/L Final   • Color, UA 01/18/2021 Yellow  Yellow, Straw Final   • Appearance, UA 01/18/2021 Clear  Clear Final   • pH, UA 01/18/2021 7.0  5.0 - 8.0 Final   • Specific Gravity, UA 01/18/2021 1.006  1.005 - 1.030 Final   • Glucose, UA 01/18/2021 Negative  Negative Final   • Ketones, UA 01/18/2021 Negative  Negative Final   • Bilirubin, UA 01/18/2021 Negative  Negative Final   • Blood, UA 01/18/2021 Trace (A) Negative Final   • Protein, UA 01/18/2021 Negative  Negative Final   • Leuk Esterase, UA 01/18/2021 Negative  Negative Final   • Nitrite, UA 01/18/2021 Negative  Negative Final   • Urobilinogen, UA 01/18/2021 0.2 E.U./dL  0.2 - 1.0 E.U./dL Final   • Extra Tube 01/18/2021 hold for add-on   Final   • Extra Tube 01/18/2021 Hold for add-ons.   Final   • Extra Tube 01/18/2021 hold for add-on   Final   • Extra Tube 01/18/2021 Hold for add-ons.   Final   • WBC 01/18/2021 7.49  3.40 - 10.80 10*3/mm3 Final   • RBC 01/18/2021 4.88  3.77 - 5.28 10*6/mm3 Final   • Hemoglobin 01/18/2021 13.5  12.0 - 15.9 g/dL Final   • Hematocrit 01/18/2021 40.7  34.0 - 46.6 % Final   • MCV 01/18/2021 83.4  79.0 - 97.0 fL Final   • MCH 01/18/2021 27.7  26.6 - 33.0 pg Final   • MCHC 01/18/2021 33.2  31.5 - 35.7 g/dL Final   • RDW 01/18/2021 13.5  12.3 - 15.4 % Final   • RDW-SD 01/18/2021 40.9  37.0 - 54.0 fl Final   • MPV 01/18/2021 8.8  6.0 - 12.0 fL Final   • Platelets 01/18/2021 247  140 - 450 10*3/mm3 Final   • Neutrophil % 01/18/2021 61.7  42.7 - 76.0 % Final   • Lymphocyte % 01/18/2021 27.8  19.6 - 45.3 % Final   • Monocyte % 01/18/2021 9.1  5.0 - 12.0 % Final   • Eosinophil % 01/18/2021 0.8  0.3 - 6.2 % Final   • Basophil % 01/18/2021 0.3  0.0 - 1.5 % Final   • Immature Grans % 01/18/2021 0.3  0.0 - 0.5 % Final   • Neutrophils, Absolute 01/18/2021 4.63  1.70 - 7.00 10*3/mm3 Final   • Lymphocytes, Absolute 01/18/2021 2.08  0.70 - 3.10  10*3/mm3 Final   • Monocytes, Absolute 01/18/2021 0.68  0.10 - 0.90 10*3/mm3 Final   • Eosinophils, Absolute 01/18/2021 0.06  0.00 - 0.40 10*3/mm3 Final   • Basophils, Absolute 01/18/2021 0.02  0.00 - 0.20 10*3/mm3 Final   • Immature Grans, Absolute 01/18/2021 0.02  0.00 - 0.05 10*3/mm3 Final   • nRBC 01/18/2021 0.1  0.0 - 0.2 /100 WBC Final   • Beta-Hydroxybutyrate Quant 01/18/2021 0.177  0.020 - 0.270 mmol/L Final   • Glucose 01/18/2021 114  70 - 130 mg/dL Final   • RBC, UA 01/18/2021 0-2  None Seen, 0-2 /HPF Final   • WBC, UA 01/18/2021 0-2  None Seen, 0-2 /HPF Final   • Bacteria, UA 01/18/2021 1+ (A) None Seen /HPF Final   • Squamous Epithelial Cells, UA 01/18/2021 0-2  None Seen, 0-2 /HPF Final   • Hyaline Casts, UA 01/18/2021 None Seen  None Seen /LPF Final   • Methodology 01/18/2021 Automated Microscopy   Final   • Glucose 01/18/2021 100  70 - 130 mg/dL Final       Diagnoses and all orders for this visit:    1. Chronic cough (Primary)  -     Case Request; Standing  -     Case Request  -     COVID PRE-OP / PRE-PROCEDURE SCREENING ORDER (NO ISOLATION) - Swab, Nasopharynx; Future    2. Irritable bowel syndrome with diarrhea    Other orders  -     nortriptyline (PAMELOR) 10 MG capsule; Take 1 capsule by mouth At Night As Needed (Diarrhea).  Dispense: 90 capsule; Refill: 3      Patient 77-year-old female with history of IBS, diabetes, hypertension and hyperlipidemia complaining of chronic cough.  Patient with no response to PPI but apparently evaluation by allergy and pulmonary showed no indication for the reason for her cough and they are wanting to rule out reflux despite the lack of response to PPI.  Patient denies heartburn no nausea vomiting just a chronic cough.  We will proceed with EGD.  Patient due for colonoscopy in 2024 we will follow-up with that at that time.

## 2022-05-12 ENCOUNTER — APPOINTMENT (OUTPATIENT)
Dept: ULTRASOUND IMAGING | Facility: HOSPITAL | Age: 77
End: 2022-05-12

## 2022-05-12 ENCOUNTER — HOSPITAL ENCOUNTER (OUTPATIENT)
Dept: MAMMOGRAPHY | Facility: HOSPITAL | Age: 77
Discharge: HOME | End: 2022-05-12
Admitting: INTERNAL MEDICINE

## 2022-05-12 DIAGNOSIS — R92.8 ABNORMAL MAMMOGRAM: ICD-10-CM

## 2022-05-12 PROCEDURE — 77066 DX MAMMO INCL CAD BI: CPT

## 2022-05-12 PROCEDURE — G0279 TOMOSYNTHESIS, MAMMO: HCPCS

## 2022-05-16 ENCOUNTER — TRANSCRIBE ORDERS (OUTPATIENT)
Dept: ADMINISTRATIVE | Facility: HOSPITAL | Age: 77
End: 2022-05-16

## 2022-05-16 DIAGNOSIS — R92.8 ABNORMAL MAMMOGRAM: Primary | ICD-10-CM

## 2022-06-06 ENCOUNTER — TELEPHONE (OUTPATIENT)
Dept: GASTROENTEROLOGY | Facility: CLINIC | Age: 77
End: 2022-06-06

## 2022-06-06 NOTE — TELEPHONE ENCOUNTER
PT HAS A SCOPE COMING UP THIS Wednesday 6/8/22 WITH DR. HORAN AND WOULD LIKE A PHONE CALL TO GO OVER ANY NECESSARY PREP NEEDED TO BE DONE PRIOR TO THE PROCEDURE. PLEASE CALL: [phone]

## 2022-06-07 ENCOUNTER — LAB (OUTPATIENT)
Dept: LAB | Facility: HOSPITAL | Age: 77
End: 2022-06-07

## 2022-06-07 ENCOUNTER — TELEPHONE (OUTPATIENT)
Dept: GASTROENTEROLOGY | Facility: CLINIC | Age: 77
End: 2022-06-07

## 2022-06-07 DIAGNOSIS — R05.3 CHRONIC COUGH: Primary | ICD-10-CM

## 2022-06-07 LAB — SARS-COV-2 ORF1AB RESP QL NAA+PROBE: NOT DETECTED

## 2022-06-07 PROCEDURE — U0005 INFEC AGEN DETEC AMPLI PROBE: HCPCS

## 2022-06-07 PROCEDURE — C9803 HOPD COVID-19 SPEC COLLECT: HCPCS

## 2022-06-07 PROCEDURE — U0004 COV-19 TEST NON-CDC HGH THRU: HCPCS

## 2022-06-07 NOTE — TELEPHONE ENCOUNTER
spoke with pt scheduled at [facility] on ---june 8--------arrive at -----1030am-----blaze----vinny---LIGIA  instructional packet handed to pt in office

## 2022-06-08 ENCOUNTER — HOSPITAL ENCOUNTER (OUTPATIENT)
Facility: HOSPITAL | Age: 77
Setting detail: HOSPITAL OUTPATIENT SURGERY
Discharge: HOME | End: 2022-06-08
Attending: INTERNAL MEDICINE | Admitting: INTERNAL MEDICINE

## 2022-06-08 ENCOUNTER — ANESTHESIA EVENT (OUTPATIENT)
Dept: GASTROENTEROLOGY | Facility: HOSPITAL | Age: 77
End: 2022-06-08

## 2022-06-08 ENCOUNTER — ANESTHESIA (OUTPATIENT)
Dept: GASTROENTEROLOGY | Facility: HOSPITAL | Age: 77
End: 2022-06-08

## 2022-06-08 VITALS
SYSTOLIC BLOOD PRESSURE: 123 MMHG | DIASTOLIC BLOOD PRESSURE: 68 MMHG | BODY MASS INDEX: 30.36 KG/M2 | OXYGEN SATURATION: 97 % | HEIGHT: 62 IN | RESPIRATION RATE: 13 BRPM | WEIGHT: 165 LBS | HEART RATE: 82 BPM

## 2022-06-08 DIAGNOSIS — R05.3 CHRONIC COUGH: ICD-10-CM

## 2022-06-08 LAB — GLUCOSE BLDC GLUCOMTR-MCNC: 135 MG/DL (ref 70–130)

## 2022-06-08 PROCEDURE — S0260 H&P FOR SURGERY: HCPCS | Performed by: INTERNAL MEDICINE

## 2022-06-08 PROCEDURE — 43239 EGD BIOPSY SINGLE/MULTIPLE: CPT | Performed by: INTERNAL MEDICINE

## 2022-06-08 PROCEDURE — 82962 GLUCOSE BLOOD TEST: CPT

## 2022-06-08 PROCEDURE — 88305 TISSUE EXAM BY PATHOLOGIST: CPT | Performed by: INTERNAL MEDICINE

## 2022-06-08 PROCEDURE — 25010000002 PROPOFOL 10 MG/ML EMULSION: Performed by: ANESTHESIOLOGY

## 2022-06-08 RX ORDER — LIDOCAINE HYDROCHLORIDE 10 MG/ML
0.5 INJECTION, SOLUTION INFILTRATION; PERINEURAL ONCE AS NEEDED
Status: DISCONTINUED | OUTPATIENT
Start: 2022-06-08 | End: 2022-06-08 | Stop reason: HOSPADM

## 2022-06-08 RX ORDER — LIDOCAINE HYDROCHLORIDE 20 MG/ML
INJECTION, SOLUTION INFILTRATION; PERINEURAL AS NEEDED
Status: DISCONTINUED | OUTPATIENT
Start: 2022-06-08 | End: 2022-06-08 | Stop reason: SURG

## 2022-06-08 RX ORDER — SODIUM CHLORIDE 0.9 % (FLUSH) 0.9 %
10 SYRINGE (ML) INJECTION AS NEEDED
Status: DISCONTINUED | OUTPATIENT
Start: 2022-06-08 | End: 2022-06-08 | Stop reason: HOSPADM

## 2022-06-08 RX ORDER — PROPOFOL 10 MG/ML
VIAL (ML) INTRAVENOUS AS NEEDED
Status: DISCONTINUED | OUTPATIENT
Start: 2022-06-08 | End: 2022-06-08 | Stop reason: SURG

## 2022-06-08 RX ORDER — PROPOFOL 10 MG/ML
VIAL (ML) INTRAVENOUS CONTINUOUS PRN
Status: DISCONTINUED | OUTPATIENT
Start: 2022-06-08 | End: 2022-06-08 | Stop reason: SURG

## 2022-06-08 RX ORDER — SODIUM CHLORIDE, SODIUM LACTATE, POTASSIUM CHLORIDE, CALCIUM CHLORIDE 600; 310; 30; 20 MG/100ML; MG/100ML; MG/100ML; MG/100ML
1000 INJECTION, SOLUTION INTRAVENOUS CONTINUOUS
Status: DISCONTINUED | OUTPATIENT
Start: 2022-06-08 | End: 2022-06-08 | Stop reason: HOSPADM

## 2022-06-08 RX ADMIN — PROPOFOL 120 MG: 10 INJECTION, EMULSION INTRAVENOUS at 11:46

## 2022-06-08 RX ADMIN — Medication 200 MCG/KG/MIN: at 11:46

## 2022-06-08 RX ADMIN — SODIUM CHLORIDE, POTASSIUM CHLORIDE, SODIUM LACTATE AND CALCIUM CHLORIDE 1000 ML: 600; 310; 30; 20 INJECTION, SOLUTION INTRAVENOUS at 11:13

## 2022-06-08 RX ADMIN — LIDOCAINE HYDROCHLORIDE 60 MG: 20 INJECTION, SOLUTION INFILTRATION; PERINEURAL at 11:46

## 2022-06-08 NOTE — H&P
[facility]  Pre Procedure History & Physical    Chief Complaint:   Chronic cough    Subjective     HPI:   Patient 77-year-old female with history diabetes, hypertension hyperlipidemia presenting for chronic cough.  Patient with negative  allergy and pulmonary work-up referred for EGD evaluation.  Past Medical History:   Past Medical History:   Diagnosis Date   • Ankle fracture 2001   • Arthritis    • Asthma    • Breast cyst    • Cancer of skin of right leg    • Diabetes mellitus (HCC)    • Fibrocystic breast    • GERD (gastroesophageal reflux disease)    • Goiter     INWARD GOITER   • Hearing loss     left   • History of colon polyps    • Hyperlipidemia    • Hypertension    • Lung nodule    • Seasonal allergies    • Sleep apnea     not using cpap right now   • Urinary frequency    • Urine frequency    • Uterine polyp    • UTI (urinary tract infection)    • Vertigo        Past Surgical History:  Past Surgical History:   Procedure Laterality Date   • ANKLE SURGERY Left     WITH METAL   • BREAST CYST EXCISION Left     about 20 years ago   • CATARACT EXTRACTION EXTRACAPSULAR W/ INTRAOCULAR LENS IMPLANTATION     • COLONOSCOPY N/A 3/6/2017    TA polyps, IH   • COLONOSCOPY N/A 2/27/2019    Procedure: COLONOSCOPY TO CECUM WITH COLD BIOPSIES;  Surgeon: Marquez Alan MD;  Location: [facility] ENDOSCOPY;  Service: Gastroenterology   • D & C HYSTEROSCOPY N/A 10/14/2020    Procedure: DILATATION AND CURETTAGE HYSTEROSCOPY WITH MYOSURE;  Surgeon: Mariano Calix MD;  Location: [facility] OR [facility];  Service: Obstetrics/Gynecology;  Laterality: N/A;   • FRACTURE SURGERY Left     ANKLE   • MOUTH SURGERY     • SKIN CANCER EXCISION Right     LEG       Family History:  Family History   Problem Relation Age of Onset   • GI problems Mother         ileostomy   • Malig Hyperthermia Neg Hx    • Breast cancer Neg Hx    • Ovarian cancer Neg Hx        Social History:   reports that she has never smoked. She has never used  "smokeless tobacco. She reports that she does not drink alcohol and does not use drugs.    Medications:   Medications Prior to Admission   Medication Sig Dispense Refill Last Dose   • albuterol sulfate  (90 Base) MCG/ACT inhaler Inhale 2 puffs Every 4 (Four) Hours As Needed for Wheezing.   Past Week at Unknown time   • aspirin 81 MG EC tablet Take 81 mg by mouth Daily. HELD   6/6/2022   • budesonide-formoterol (SYMBICORT) 160-4.5 MCG/ACT inhaler Inhale 2 puffs 2 (two) times a day.   6/7/2022 at Unknown time   • fexofenadine-pseudoephedrine (ALLEGRA-D 24) 180-240 MG per 24 hr tablet Take 1 tablet by mouth As Needed for Allergies.   Past Week at Unknown time   • fluticasone (FLONASE) 50 MCG/ACT nasal spray 2 sprays into the nostril(s) as directed by provider As Needed for Rhinitis.   6/7/2022 at Unknown time   • glipiZIDE (GLUCOTROL) 5 MG ER tablet Take 5 mg by mouth Daily.   6/7/2022 at Unknown time   • levocetirizine (XYZAL) 5 MG tablet Take 5 mg by mouth As Needed.   6/7/2022 at Unknown time   • metFORMIN (GLUCOPHAGE) 500 MG tablet Take 500 mg by mouth every night at bedtime.   6/7/2022 at Unknown time   • montelukast (SINGULAIR) 10 MG tablet Take 10 mg by mouth Every Night.   6/7/2022 at Unknown time   • nortriptyline (PAMELOR) 10 MG capsule Take 1 capsule by mouth At Night As Needed (Diarrhea). 90 capsule 3 Past Month at Unknown time   • simvastatin (ZOCOR) 10 MG tablet Take 10 mg by mouth Every Night.   6/7/2022 at Unknown time   • SPIRIVA RESPIMAT 1.25 MCG/ACT aerosol solution inhaler 2 puffs Daily.   6/7/2022 at Unknown time   • Chlorhexidine Gluconate Cloth 2 % pads Apply  topically Take As Directed.   Unknown at Unknown time   • lisinopril (PRINIVIL,ZESTRIL) 5 MG tablet Take 5 mg by mouth Every Night.          Allergies:  Sulfa antibiotics    ROS:    Pertinent items are noted in HPI     Objective     Blood pressure 145/89, pulse 88, resp. rate 16, height 157.5 cm (62\"), weight 74.8 kg (165 lb), SpO2 99 " %, not currently breastfeeding.    Physical Exam   Constitutional: Pt is oriented to person, place, and time and well-developed, well-nourished, and in no distress.   Mouth/Throat: Oropharynx is clear and moist.   Neck: Normal range of motion.   Cardiovascular: Normal rate, regular rhythm and normal heart sounds.    Pulmonary/Chest: Effort normal and breath sounds normal.   Abdominal: Soft. Nontender  Skin: Skin is warm and dry.   Psychiatric: Mood, memory, affect and judgment normal.     Assessment & Plan     Diagnosis:  Chronic cough    Anticipated Surgical Procedure:  EGD    The risks, benefits, and alternatives of this procedure have been discussed with the patient or the responsible party- the patient understands and agrees to proceed.

## 2022-06-08 NOTE — ANESTHESIA PREPROCEDURE EVALUATION
Anesthesia Evaluation     Patient summary reviewed and Nursing notes reviewed                Airway   Mallampati: II  TM distance: <3 FB  Neck ROM: full  Possible difficult intubation  Dental    (+) implants    Comment: Upper implants    Pulmonary - normal exam   (+) asthma,sleep apnea,     ROS comment: No CPAP currently  Cardiovascular - normal exam    ECG reviewed    (+) hypertension less than 2 medications, hyperlipidemia,       Neuro/Psych  (+) dizziness/light headedness,      ROS Comment: Vertigo  GI/Hepatic/Renal/Endo    (+) obesity,  GERD,  diabetes mellitus type 2, thyroid problem     Musculoskeletal     Abdominal   (+) obese,    Substance History      OB/GYN          Other   arthritis,    history of cancer      Other Comment: Skin CA                Anesthesia Plan    ASA 2     MAC     intravenous induction     Anesthetic plan, risks, benefits, and alternatives have been provided, discussed and informed consent has been obtained with: patient.        CODE STATUS:

## 2022-06-08 NOTE — ANESTHESIA POSTPROCEDURE EVALUATION
Patient: Olinda Baumann    Procedure Summary     Date: 06/08/22 Room / Location:  [facility]    Anesthesia Start: 1140 Anesthesia Stop: 1159    Procedure: ESOPHAGOGASTRODUODENOSCOPY with biopsy (N/A Esophagus) Diagnosis:       Chronic cough      Gastritis      Gastric polyps      (Chronic cough [R05.3])    Surgeons: Marquez Alan MD Provider: hCidi Olmedo MD    Anesthesia Type: MAC ASA Status: 2          Anesthesia Type: MAC    Vitals  Vitals Value Taken Time   /54 06/08/22 1157   Temp     Pulse     Resp 10 06/08/22 1157   SpO2 100 % 06/08/22 1157           Post Anesthesia Care and Evaluation    Patient location during evaluation: PHASE II  Patient participation: complete - patient participated  Level of consciousness: awake and alert  Pain management: adequate    Airway patency: patent  Anesthetic complications: No anesthetic complications  PONV Status: none  Cardiovascular status: acceptable and hemodynamically stable  Respiratory status: acceptable, nonlabored ventilation and spontaneous ventilation  Hydration status: acceptable

## 2022-06-08 NOTE — BRIEF OP NOTE
ESOPHAGOGASTRODUODENOSCOPY  Progress Note    Olinda Baumann  6/8/2022    Pre-op Diagnosis:   Chronic cough [R05.3]       Post-Op Diagnosis Codes:     * Chronic cough [R05.3]     * Gastritis [K29.70]     * Gastric polyps [K31.7]    Procedure/CPT® Codes:        Procedure(s):  ESOPHAGOGASTRODUODENOSCOPY with biopsy    Surgeon(s):  Marquez Alan MD    Anesthesia: Monitored Anesthesia Care    Staff:   Endo Technician: Siria Rizvi PCT  Endo Nurse: Marycruz Marquez RN         Estimated Blood Loss: minimal    Urine Voided: * No values recorded between 6/8/2022 11:41 AM and 6/8/2022 11:51 AM *    Specimens:                Specimens     ID Source Type Tests Collected By Collected At Frozen?    A Gastric, Antrum Tissue · TISSUE PATHOLOGY EXAM   Marquez Alan MD 6/8/22 1150                 Drains: * No LDAs found *    Findings: EGD with normal esophageal mucosa throughout.  GE junction was normal and smooth.  Antral gastritis seen biopsies obtained.  Several small diminutive gastric polyps were identified.  Duodenum was normal throughout.        Complications: None          Marquez Alan MD     Date: 6/8/2022  Time: 11:52 EDT

## 2022-06-09 LAB
LAB AP CASE REPORT: NORMAL
PATH REPORT.FINAL DX SPEC: NORMAL
PATH REPORT.GROSS SPEC: NORMAL

## 2022-06-13 ENCOUNTER — HOSPITAL ENCOUNTER (OUTPATIENT)
Dept: MAMMOGRAPHY | Facility: HOSPITAL | Age: 77
Discharge: HOME | End: 2022-06-13
Admitting: INTERNAL MEDICINE

## 2022-06-13 VITALS
OXYGEN SATURATION: 97 % | WEIGHT: 165 LBS | RESPIRATION RATE: 18 BRPM | SYSTOLIC BLOOD PRESSURE: 165 MMHG | HEART RATE: 90 BPM | HEIGHT: 62 IN | BODY MASS INDEX: 30.36 KG/M2 | TEMPERATURE: 97.1 F | DIASTOLIC BLOOD PRESSURE: 82 MMHG

## 2022-06-13 DIAGNOSIS — R92.8 ABNORMAL MAMMOGRAM: ICD-10-CM

## 2022-06-13 PROCEDURE — 88360 TUMOR IMMUNOHISTOCHEM/MANUAL: CPT | Performed by: INTERNAL MEDICINE

## 2022-06-13 PROCEDURE — 88341 IMHCHEM/IMCYTCHM EA ADD ANTB: CPT | Performed by: INTERNAL MEDICINE

## 2022-06-13 PROCEDURE — 88305 TISSUE EXAM BY PATHOLOGIST: CPT | Performed by: INTERNAL MEDICINE

## 2022-06-13 PROCEDURE — 88342 IMHCHEM/IMCYTCHM 1ST ANTB: CPT | Performed by: INTERNAL MEDICINE

## 2022-06-13 PROCEDURE — 0 LIDOCAINE 1 % SOLUTION: Performed by: INTERNAL MEDICINE

## 2022-06-13 RX ORDER — LIDOCAINE HYDROCHLORIDE AND EPINEPHRINE 20; 5 MG/ML; UG/ML
10 INJECTION, SOLUTION EPIDURAL; INFILTRATION; INTRACAUDAL; PERINEURAL ONCE
Status: COMPLETED | OUTPATIENT
Start: 2022-06-13 | End: 2022-06-13

## 2022-06-13 RX ORDER — LIDOCAINE HYDROCHLORIDE 10 MG/ML
1 INJECTION, SOLUTION INFILTRATION; PERINEURAL ONCE
Status: COMPLETED | OUTPATIENT
Start: 2022-06-13 | End: 2022-06-13

## 2022-06-13 RX ADMIN — Medication 1 ML: at 08:27

## 2022-06-13 RX ADMIN — LIDOCAINE HYDROCHLORIDE,EPINEPHRINE BITARTRATE 10 ML: 20; .005 INJECTION, SOLUTION EPIDURAL; INFILTRATION; INTRACAUDAL; PERINEURAL at 08:28

## 2022-06-13 NOTE — H&P
Name: Olinda Baumann ADMIT: 2022   : 1945  PCP: Bill Hwang Jr., MD    MRN: [id number] LOS: 0 days   AGE/SEX: 77 y.o. female  ROOM: Room/bed info not found       Chief complaint L breast calcs    Present Illness or Internal History:  Patient is a 77 y.o. female presents with L breast calcs for stereo bx.     Past Surgical History:  Past Surgical History:   Procedure Laterality Date   • ANKLE SURGERY Left     WITH METAL   • BREAST CYST EXCISION Left     about 20 years ago   • CATARACT EXTRACTION EXTRACAPSULAR W/ INTRAOCULAR LENS IMPLANTATION     • COLONOSCOPY N/A 3/6/2017    TA polyps, IH   • COLONOSCOPY N/A 2019    Procedure: COLONOSCOPY TO CECUM WITH COLD BIOPSIES;  Surgeon: Marquez Alan MD;  Location: [facility] ENDOSCOPY;  Service: Gastroenterology   • D & C HYSTEROSCOPY N/A 10/14/2020    Procedure: DILATATION AND CURETTAGE HYSTEROSCOPY WITH MYOSURE;  Surgeon: Mariano Calix MD;  Location: [facility] OR [facility];  Service: Obstetrics/Gynecology;  Laterality: N/A;   • ENDOSCOPY N/A 2022    Procedure: ESOPHAGOGASTRODUODENOSCOPY with biopsy;  Surgeon: Marquez Alan MD;  Location: [facility] ENDOSCOPY;  Service: Gastroenterology;  Laterality: N/A;  gastritis   • FRACTURE SURGERY Left     ANKLE   • MOUTH SURGERY     • SKIN CANCER EXCISION Right     LEG       Past Medical History:  Past Medical History:   Diagnosis Date   • Ankle fracture    • Arthritis    • Asthma    • Breast cyst    • Cancer of skin of right leg    • Diabetes mellitus (HCC)    • Fibrocystic breast    • GERD (gastroesophageal reflux disease)    • Goiter     INWARD GOITER   • Hearing loss     left   • History of colon polyps    • Hyperlipidemia    • Hypertension    • Lung nodule    • Seasonal allergies    • Sleep apnea     not using cpap right now   • Urinary frequency    • Urine frequency    • Uterine polyp    • UTI (urinary tract infection)    • Vertigo        Home Medications:  (Not in a hospital  admission)      Allergies:  Sulfa antibiotics    Family History:  Family History   Problem Relation Age of Onset   • GI problems Mother         ileostomy   • Dementia Mother    • Pancreatic cancer Father    • Malig Hyperthermia Neg Hx    • Breast cancer Neg Hx    • Ovarian cancer Neg Hx        Social History:  Social History     Tobacco Use   • Smoking status: Never Smoker   • Smokeless tobacco: Never Used   Vaping Use   • Vaping Use: Never used   Substance Use Topics   • Alcohol use: Never     Comment: RARELY   • Drug use: Never        Objective     Physical Exam:    No exam performed today,    Vital Signs  Temp:  [97.1 °F (36.2 °C)] 97.1 °F (36.2 °C)  Heart Rate:  [95] 95  Resp:  [18] 18  BP: (160)/(90) 160/90    Anticipated Surgical Procedure:  Left breast stereotactic core needle biopsy    The risks, benefits and alternatives of this procedure have been discussed with the patient or responsible party: Yes        Renetta Chavez MD  06/13/22  08:13 EDT

## 2022-06-13 NOTE — NURSING NOTE
Biopsy site to left breast clear with Dermabond dry and intact. No firmness or swelling noted at or around biopsy site. Denies pain. Ice pack with protective covering applied to biopsy site. Discharge instructions discussed with understanding voiced by patient. Copies provided to patient. No distress noted. To home via private vehicle.

## 2022-06-14 LAB
LAB AP CASE REPORT: NORMAL
LAB AP SPECIAL STAINS: NORMAL
LAB AP SYNOPTIC CHECKLIST: NORMAL
PATH REPORT.FINAL DX SPEC: NORMAL
PATH REPORT.GROSS SPEC: NORMAL

## 2022-06-17 ENCOUNTER — TRANSCRIBE ORDERS (OUTPATIENT)
Dept: ADMINISTRATIVE | Facility: HOSPITAL | Age: 77
End: 2022-06-17

## 2022-06-17 DIAGNOSIS — D05.10 DUCTAL CARCINOMA IN SITU (DCIS) OF BREAST, UNSPECIFIED LATERALITY: Primary | ICD-10-CM

## 2022-06-23 DIAGNOSIS — C50.919 MALIGNANT NEOPLASM OF FEMALE BREAST, UNSPECIFIED ESTROGEN RECEPTOR STATUS, UNSPECIFIED LATERALITY, UNSPECIFIED SITE OF BREAST: Primary | ICD-10-CM

## 2022-07-02 ENCOUNTER — HOSPITAL ENCOUNTER (OUTPATIENT)
Dept: MRI IMAGING | Facility: HOSPITAL | Age: 77
Discharge: HOME | End: 2022-07-02
Admitting: INTERNAL MEDICINE

## 2022-07-02 DIAGNOSIS — D05.10 DUCTAL CARCINOMA IN SITU (DCIS) OF BREAST, UNSPECIFIED LATERALITY: ICD-10-CM

## 2022-07-02 PROCEDURE — 82565 ASSAY OF CREATININE: CPT

## 2022-07-02 PROCEDURE — C8937 CAD BREAST MRI: HCPCS

## 2022-07-02 PROCEDURE — 0 GADOBENATE DIMEGLUMINE 529 MG/ML SOLUTION: Performed by: INTERNAL MEDICINE

## 2022-07-02 PROCEDURE — C8908 MRI W/O FOL W/CONT, BREAST,: HCPCS

## 2022-07-02 PROCEDURE — A9577 INJ MULTIHANCE: HCPCS | Performed by: INTERNAL MEDICINE

## 2022-07-02 RX ADMIN — GADOBENATE DIMEGLUMINE 15 ML: 529 INJECTION, SOLUTION INTRAVENOUS at 10:18

## 2022-07-05 ENCOUNTER — NURSE NAVIGATOR (OUTPATIENT)
Dept: OTHER | Facility: HOSPITAL | Age: 77
End: 2022-07-05

## 2022-07-05 ENCOUNTER — OFFICE VISIT (OUTPATIENT)
Dept: SURGERY | Facility: CLINIC | Age: 77
End: 2022-07-05

## 2022-07-05 VITALS — HEIGHT: 62 IN | BODY MASS INDEX: 30.91 KG/M2 | WEIGHT: 168 LBS

## 2022-07-05 DIAGNOSIS — D05.12 DUCTAL CARCINOMA IN SITU (DCIS) OF LEFT BREAST: Primary | ICD-10-CM

## 2022-07-05 LAB — CREAT BLDA-MCNC: 0.6 MG/DL (ref 0.6–1.3)

## 2022-07-05 PROCEDURE — 99204 OFFICE O/P NEW MOD 45 MIN: CPT | Performed by: STUDENT IN AN ORGANIZED HEALTH CARE EDUCATION/TRAINING PROGRAM

## 2022-07-05 NOTE — PROGRESS NOTES
General Surgery Breast Cancer History and Physical Exam     Summary:    Olinda Baumann is a 77 y.o. lady. 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      A multidisciplinary plan has been formulated for the patient:    (1) Breast Surgical Oncology:  -Follow up [employer] 9 panel genetic testing. I will call her with results.   -MRI to evaluate anatomy as well as for surgical planning. Awaiting read.  -Nurse navigator consult.   -Surgical plan: Likely plan for left breast wire localized lumpectomy pending MRI results. She would like to proceed with surgery after her 2 week vacation starting July 30.   -Plastic surgery referral deferred.     (2) Medical Oncology:  -Will refer postoperatively for evaluation for endocrine therapy.    (3) Radiation Oncology:  -Will refer postoperatively for evaluation for radiation therapy as clinically indicated.    Referring Provider: Bill Hwang Jr.,*    Chief Complaint: abnormal breast imaging    History of Present Illness: Ms. Olinda Baumann is a 77 y.o. year old lady, seen at the request of Bill Hwang Jr.* for a new diagnosis of left breast cancer.      This was initially detected as an imaging abnormality. She has had annual mammograms each year. She has no prior history of breast biopsy. Her work-up is detailed in the oncologic history below.     She denies any breast lumps, pain, skin changes, or nipple discharge. She denies any family history of breast or ovarian cancer.     Workup of Current Diagnosis:    4/12/2022 Bilateral Screening Mammogram  IMPRESSION:  1. There are multiple microcalcifications seen in the anterior one-third retroareolar region of the left breast. Further evaluation with spot magnification CC and 90 degree lateral images is recommended.  2. There is an area of focal asymmetry in the middle third lateral aspect of the right breast. Further evaluation with spot  compression CC mammographic and tomosynthesis images and possible targeted right breast sonography is recommended.  BI-RADS Category 0: Incomplete - Needs additional imaging evaluation.    5/12/2022 Bilateral Diagnostic Mammogram   In the right breast in the middle third lateral aspect of the previous described area of focal asymmetry resolves with additional imaging. This is consistent with normal breast parenchyma. No suspicious findings in the right breast are visualized.  In the left breast additional imaging demonstrates the presence of multiple microcalcifications in the cluster with a linear distribution spanning a distance of approximately 1.8 cm seen in the retroareolar region of the left breast at the 6 clock position. There is a question of mild pleomorphism.  IMPRESSION:  1. There is a 1.8 cm cluster of linear microcalcifications in the retroareolar region of the left breast at the 6 o'clock position. Correlation with a stereo-tactic guided left breast biopsy is recommended.  2. There are no findings suspicious for malignancy in the right breast.  BI-RADS Category 4: Suspicious abnormality. Biopsy should be considered.    6/13/2022 Left Breast Stereotactic Biopsy  The calcifications in the anterior left breast was/were localized and targeted under stereotactic/tomosynthesis guidance via a(n) medial approach. 4 core specimens were obtained. Specimen radiography demonstrated the targeted calcifications. A tribell clip was then deployed at the biopsy site. The patient tolerated the procedure well with no immediate complications.  Post-procedural digital mammographic views of the left breast demonstrate the tribell clip at the expected location at the site of biopsy in the lower central anterior left breast at the lateral margin of residual calcifications.  IMPRESSION:  1. Stereotactic/Tomosynthesis guided core needle biopsy of a 1.8 cm group of calcifications at 6:00 in the retroareolar left breast, marked  with a tribell biopsy clip, where there are residual calcifications. Pathology is malignant and concordant with the imaging assessment. Surgical consultation is recommended. Contrast-enhanced breast MRI should be considered to evaluate extent of disease.     2022 Pathology  Final Diagnosis   1. Breast, Left, Retro, 6 O'clock, Biopsy: Ductal carcinoma in situ (DCIS).               A. The ductal carcinoma in situ is of high nuclear grade with comedo, solid and cribriform architecture and                     expansive necrosis.                 B. Microcalcifications are associated with the ductal carcinoma in situ.               C. DCIS measures up to 3 mm on the slide.     2022 Bilateral Breast MRI:   Read pending    Gynecologic History:   . P:0 AB:6  Age at first childbirth: N/A  Lactation/How long: N/A  Age at menarche: 13  Age at menopause: 51  Total years of oral contraceptive use: 5-6 years previously  Total years of hormone replacement therapy: No    Past Medical History:   • DM  • Asthma   • HLD    Past Surgical History:    • Left ankle surgery   • L breast cyst excision, 20 years ago, benign  • Bilateral cataract extraction   • D&C  • R leg skin cancer excision    Family History:    • As above    Social History:  • Denies tobacco use  • Occasional alcohol use    Allergies:   Allergies   Allergen Reactions   • Sulfa Antibiotics Other (See Comments)     Does not remember reaction       Medications:     Current Outpatient Medications:   •  albuterol sulfate  (90 Base) MCG/ACT inhaler, Inhale 2 puffs Every 4 (Four) Hours As Needed for Wheezing., Disp: , Rfl:   •  aspirin 81 MG EC tablet, Take 81 mg by mouth Daily. HELD, Disp: , Rfl:   •  budesonide-formoterol (SYMBICORT) 160-4.5 MCG/ACT inhaler, Inhale 2 puffs 2 (two) times a day., Disp: , Rfl:   •  fexofenadine-pseudoephedrine (ALLEGRA-D 24) 180-240 MG per 24 hr tablet, Take 1 tablet by mouth As Needed for Allergies., Disp: , Rfl:   •   fluticasone (FLONASE) 50 MCG/ACT nasal spray, 2 sprays into the nostril(s) as directed by provider As Needed for Rhinitis., Disp: , Rfl:   •  glipiZIDE (GLUCOTROL) 5 MG ER tablet, Take 5 mg by mouth Daily., Disp: , Rfl:   •  levocetirizine (XYZAL) 5 MG tablet, Take 5 mg by mouth As Needed., Disp: , Rfl:   •  metFORMIN (GLUCOPHAGE) 500 MG tablet, Take 500 mg by mouth every night at bedtime., Disp: , Rfl:   •  montelukast (SINGULAIR) 10 MG tablet, Take 10 mg by mouth Every Night., Disp: , Rfl:   •  nortriptyline (PAMELOR) 10 MG capsule, Take 1 capsule by mouth At Night As Needed (Diarrhea)., Disp: 90 capsule, Rfl: 3  •  simvastatin (ZOCOR) 10 MG tablet, Take 10 mg by mouth Every Night., Disp: , Rfl:   •  SPIRIVA RESPIMAT 1.25 MCG/ACT aerosol solution inhaler, 2 puffs Daily., Disp: , Rfl:     Laboratory Values:    Labs from 2021 reviewed    Review of Systems:   Influenza-like illness: no fever, no  cough, no  sore throat, no  body aches, no loss of sense of taste or smell, no known exposure to person with Covid-19.  Constitutional: Negative for fevers or chills  HENT: Negative for hearing loss or runny nose  Eyes: Negative for vision changes or scleral icterus  Respiratory: Negative for cough or shortness of breath  Cardiovascular: Negative for chest pain or heart palpitations  Gastrointestinal: Negative for abdominal pain, nausea, vomiting, constipation, melena, or hematochezia  Genitourinary: Negative for hematuria or dysuria  Musculoskeletal: Negative for joint swelling or gait instability  Neurologic: Negative for tremors or seizures  Psychiatric: Negative for suicidal ideations or depression  All other systems reviewed and negative    Physical Exam:   • ECO - Asymptomatic  • Constitutional: Well-developed well-nourished, no acute distress  • Eyes: Conjunctiva normal, sclera nonicteric  • ENMT: Hearing grossly normal, oral mucosa moist  • Neck: Supple, no palpable mass, trachea midline  • Respiratory: Clear  to auscultation, normal inspiratory effort  • Cardiovascular: Regular rate, no peripheral edema, no jugular venous distention  • Breast: symmetric  o Right: No visible abnormalities on inspection while seated, with arms raised or hands on hips. No masses, skin changes, or nipple abnormalities.  o Left: No visible abnormalities on inspection while seated, with arms raised or hands on hips. No masses, skin changes, or nipple abnormalities. Moderate bruising at 9:00 2cm FN from biopsy site.  o Biopsy site appreciated in left breast, otherwise no skin changes.   o No clinical chest wall involvement.  • Gastrointestinal: Soft, nontender  • Lymphatics (palpable nodes): No cervical, supraclavicular or axillary lymphadenopathy  • Skin:  Warm, dry, no rash on visualized skin surfaces  • Musculoskeletal: Symmetric strength, normal gait  • Psychiatric: Alert and oriented ×3, normal affect     Discussion:  I had an extensive discussion with the patient and her family about the nature of her breast cancer diagnosis. We reviewed the components of breast tissue including ducts and lobules. We reviewed her pathology report in detail. We reviewed breast cancer histology, including stage, grade, ER/MT receptors, HER2 receptors and how this applies to her diagnosis. We reviewed the basics of systemic and local/regional management of breast cancer.     We discussed that most breast cancer is not hereditary, that I do not believe this plays a role in her case, and that genetic testing is not warranted.     We reviewed potential surgical treatments to include partial mastectomy, mastectomy, sentinel lymph node biopsy and axillary node dissection and discussed the rationale associated with each approach. Regarding radiation therapy, we discussed that radiation is indicated in all cases of breast conservation and in only limited circumstances following mastectomy. We discussed that the primary goal of adjuvant radiation is to decrease the  likelihood of local recurrence.     In her case, she is a good candidate for lumpectomy and she would like to proceed with breast conservation. We discussed that lumpectomy would require preoperative wire-localization. We also discussed the risk of positive margins and that she must have negative margins for lumpectomy to be an appropriate oncologic procedure. I will make every effort to obtain negative margins at her initial operation, but there is a 10-15% chance that she will require a second operation for re-excision, or possibly a total mastectomy. We will not know the margin status until after her final pathology has returned.     I described additional risks and potential complications associated with surgery, including, but not limited to, bleeding, infection, deformity/poor cosmetic result, chronic pain, numbness, seroma, hematoma, deep venous thrombosis, skin flap necrosis, disease recurrence and the possibility of requiring additional surgery. We also discussed other treatment options including the option of not undergoing any surgical treatment and the risks associated with this including disease progression. She expressed an understanding of these factors and wished to proceed.    We discussed that in her case, systemic treatment would likely involve endocrine therapy/targeted therapy.     RYAN JOLLEY M.D.  General and Endoscopic Surgery  [facility]    [address], [address]  [place], [place], [address]  P: [phone]  F: [phone]

## 2022-07-05 NOTE — PROGRESS NOTES
Referral received from Dr. Juarez's office. Met Ms. Baumann and her  during her surgery consult. I introduced myself and navigational services. She has a good understanding of her pathology and treatment options presented to her by Dr. Juarez. After the consult she is leaning toward having a lumpectomy. She is comfortable with this plan and has no questions or concerns following the consult.     We discussed her support system and she stated her family was very supportive and she has no resource needs at this time.     We discussed integrative therapies and other services at [facility]. I gave her a navigation folder with the following information:   [facility] Support [facility], Cancer and Restorative Exercise (CARE), [facility], Together for Breast Cancer Survival, Guide for the Newly Diagnosed, [employer], [facility], Massage Therapy, Reiki Therapy, [employer] [place], Cancer Nutrition, and Survivorship Clinic.    She verbalized appreciation for navigational services and she has my contact information and will call with any questions that arise.

## 2022-07-11 ENCOUNTER — PREP FOR SURGERY (OUTPATIENT)
Dept: OTHER | Facility: HOSPITAL | Age: 77
End: 2022-07-11

## 2022-07-11 ENCOUNTER — TELEPHONE (OUTPATIENT)
Dept: SURGERY | Facility: CLINIC | Age: 77
End: 2022-07-11

## 2022-07-11 DIAGNOSIS — D05.10 DUCTAL CARCINOMA IN SITU (DCIS) OF BREAST, UNSPECIFIED LATERALITY: Primary | ICD-10-CM

## 2022-07-11 RX ORDER — CEFAZOLIN SODIUM 2 G/100ML
2 INJECTION, SOLUTION INTRAVENOUS ONCE
Status: CANCELLED | OUTPATIENT
Start: 2022-08-18 | End: 2022-07-11

## 2022-07-11 RX ORDER — DIAZEPAM 5 MG/1
5 TABLET ORAL ONCE
Status: CANCELLED | OUTPATIENT
Start: 2022-08-18 | End: 2022-07-11

## 2022-07-11 NOTE — TELEPHONE ENCOUNTER
Called Olinda Baumann regarding MRI results    No new findings. Known area of malignancy seen; 1.3cm DCIS retroareolar 6:00 Tri-Bell clip. Remainder of both breasts and lymph node areas normal.     Plan to proceed with surgery as planned: left breast wire localized lumpectomy. Orders placed. Will schedule. Out of town 7/30/2022 to 8/15/2022.    Sarah Juarez MD

## 2022-07-21 ENCOUNTER — NURSE NAVIGATOR (OUTPATIENT)
Dept: OTHER | Facility: HOSPITAL | Age: 77
End: 2022-07-21

## 2022-07-21 NOTE — PROGRESS NOTES
Called Ms. Baumann to see how she was doing. She stated she is fighting a UTI currently but is doing well otherwise. They have no needs at this time. She was thankful for the call and will reach out if any questions or needs arise.

## 2022-08-15 ENCOUNTER — NURSE NAVIGATOR (OUTPATIENT)
Dept: OTHER | Facility: HOSPITAL | Age: 77
End: 2022-08-15

## 2022-08-15 NOTE — PROGRESS NOTES
Ms. Kurgermarisela called with questions about visitor policy, medications and pre op instructions. Encouraged her to ask her PAT nurse many of these questions tomorrow. She was thankful for the help and will reach out with any other concerns that arise.

## 2022-08-16 ENCOUNTER — PRE-ADMISSION TESTING (OUTPATIENT)
Dept: PREADMISSION TESTING | Facility: HOSPITAL | Age: 77
End: 2022-08-16

## 2022-08-16 VITALS
TEMPERATURE: 98.8 F | HEIGHT: 62 IN | HEART RATE: 99 BPM | SYSTOLIC BLOOD PRESSURE: 165 MMHG | DIASTOLIC BLOOD PRESSURE: 89 MMHG | WEIGHT: 167.6 LBS | BODY MASS INDEX: 30.84 KG/M2 | RESPIRATION RATE: 20 BRPM | OXYGEN SATURATION: 96 %

## 2022-08-16 DIAGNOSIS — D05.10 DUCTAL CARCINOMA IN SITU (DCIS) OF BREAST, UNSPECIFIED LATERALITY: ICD-10-CM

## 2022-08-16 LAB
ANION GAP SERPL CALCULATED.3IONS-SCNC: 11.6 MMOL/L (ref 5–15)
BUN SERPL-MCNC: 12 MG/DL (ref 8–23)
BUN/CREAT SERPL: 20.3 (ref 7–25)
CALCIUM SPEC-SCNC: 9.3 MG/DL (ref 8.6–10.5)
CHLORIDE SERPL-SCNC: 104 MMOL/L (ref 98–107)
CO2 SERPL-SCNC: 23.4 MMOL/L (ref 22–29)
CREAT SERPL-MCNC: 0.59 MG/DL (ref 0.57–1)
DEPRECATED RDW RBC AUTO: 40 FL (ref 37–54)
EGFRCR SERPLBLD CKD-EPI 2021: 93.5 ML/MIN/1.73
ERYTHROCYTE [DISTWIDTH] IN BLOOD BY AUTOMATED COUNT: 13.2 % (ref 12.3–15.4)
GLUCOSE SERPL-MCNC: 192 MG/DL (ref 65–99)
HCT VFR BLD AUTO: 38.8 % (ref 34–46.6)
HGB BLD-MCNC: 12.9 G/DL (ref 12–15.9)
MCH RBC QN AUTO: 27.9 PG (ref 26.6–33)
MCHC RBC AUTO-ENTMCNC: 33.2 G/DL (ref 31.5–35.7)
MCV RBC AUTO: 84 FL (ref 79–97)
PLATELET # BLD AUTO: 238 10*3/MM3 (ref 140–450)
PMV BLD AUTO: 8.6 FL (ref 6–12)
POTASSIUM SERPL-SCNC: 4.1 MMOL/L (ref 3.5–5.2)
QT INTERVAL: 364 MS
RBC # BLD AUTO: 4.62 10*6/MM3 (ref 3.77–5.28)
SARS-COV-2 ORF1AB RESP QL NAA+PROBE: NOT DETECTED
SODIUM SERPL-SCNC: 139 MMOL/L (ref 136–145)
WBC NRBC COR # BLD: 6.36 10*3/MM3 (ref 3.4–10.8)

## 2022-08-16 PROCEDURE — C9803 HOPD COVID-19 SPEC COLLECT: HCPCS

## 2022-08-16 PROCEDURE — 36415 COLL VENOUS BLD VENIPUNCTURE: CPT

## 2022-08-16 PROCEDURE — U0004 COV-19 TEST NON-CDC HGH THRU: HCPCS

## 2022-08-16 PROCEDURE — 93005 ELECTROCARDIOGRAM TRACING: CPT

## 2022-08-16 PROCEDURE — 80048 BASIC METABOLIC PNL TOTAL CA: CPT

## 2022-08-16 PROCEDURE — 93010 ELECTROCARDIOGRAM REPORT: CPT | Performed by: INTERNAL MEDICINE

## 2022-08-16 PROCEDURE — 85027 COMPLETE CBC AUTOMATED: CPT

## 2022-08-16 PROCEDURE — U0005 INFEC AGEN DETEC AMPLI PROBE: HCPCS

## 2022-08-16 RX ORDER — CHLORHEXIDINE GLUCONATE 500 MG/1
CLOTH TOPICAL
COMMUNITY
End: 2022-09-12

## 2022-08-16 NOTE — DISCHARGE INSTRUCTIONS
CHLORHEXIDINE CLOTH INSTRUCTIONS  The morning of surgery follow these instructions using the Chlorhexidine cloths you've been given.  These steps reduce bacteria on the body.  Do not use the cloths near your eyes, ears mouth, genitalia or on open wounds.  Throw the cloths away after use but do not try to flush them down a toilet.      Open and remove one cloth at a time from the package.    Leave the cloth unfolded and begin the bathing.  Massage the skin with the cloths using gentle pressure to remove bacteria.  Do not scrub harshly.   Follow the steps below with one 2% CHG cloth per area (6 total cloths).  One cloth for neck, shoulders and chest.  One cloth for both arms, hands, fingers and underarms (do underarms last).  One cloth for the abdomen followed by groin.  One cloth for right leg and foot including between the toes.  One cloth for left leg and foot including between the toes.  The last cloth is to be used for the back of the neck, back and buttocks.    Allow the CHG to air dry 3 minutes on the skin which will give it time to work and decrease the chance of irritation.  The skin may feel sticky until it is dry.  Do not rinse with water or any other liquid or you will lose the beneficial effects of the CHG.  If mild skin irritation occurs, do rinse the skin to remove the CHG.  Report this to the nurse at time of admission.  Do not apply lotions, creams, ointments, deodorants or perfumes after using the clothes. Dress in clean clothes before coming to the hospital.     Take the following medications the morning of surgery:  INHALERS    ARRIVAL TIME 0730 ON 8/18/22       If you are on prescription narcotic pain medication to control your pain you may also take that medication the morning of surgery.    General Instructions:  Do not eat solid food after midnight the night before surgery.  You may drink clear liquids day of surgery but must stop at least one hour before your hospital arrival time.  It is  beneficial for you to have a clear drink that contains carbohydrates the day of surgery.  We suggest a 12 to 20 ounce bottle of Gatorade or Powerade for non-diabetic patients or a 12 to 20 ounce bottle of G2 or Powerade Zero for diabetic patients. (Pediatric patients, are not advised to drink a 12 to 20 ounce carbohydrate drink)    Clear liquids are liquids you can see through.  Nothing red in color.     Plain water                               Sports drinks  Sodas                                   Gelatin (Jell-O)  Fruit juices without pulp such as white grape juice and apple juice  Popsicles that contain no fruit or yogurt  Tea or coffee (no cream or milk added)  Gatorade / Powerade  G2 / Powerade Zero    Infants may have breast milk up to four hours before surgery.  Infants drinking formula may drink formula up to six hours before surgery.   Patients who avoid smoking, chewing tobacco and alcohol for 4 weeks prior to surgery have a reduced risk of post-operative complications.  Quit smoking as many days before surgery as you can.  Do not smoke, use chewing tobacco or drink alcohol the day of surgery.   If applicable bring your C-PAP/ BI-PAP machine.  Bring any papers given to you in the doctor’s office.  Wear clean comfortable clothes.  Do not wear contact lenses, false eyelashes or make-up.  Bring a case for your glasses.   Bring crutches or walker if applicable.  Remove all piercings.  Leave jewelry and any other valuables at home.  Hair extensions with metal clips must be removed prior to surgery.  The Pre-Admission Testing nurse will instruct you to bring medications if unable to obtain an accurate list in Pre-Admission Testing.            Preventing a Surgical Site Infection:  For 2 to 3 days before surgery, avoid shaving with a razor because the razor can irritate skin and make it easier to develop an infection.    Any areas of open skin can increase the risk of a post-operative wound infection by allowing  bacteria to enter and travel throughout the body.  Notify your surgeon if you have any skin wounds / rashes even if it is not near the expected surgical site.  The area will need assessed to determine if surgery should be delayed until it is healed.  The night prior to surgery shower using a fresh bar of anti-bacterial soap (such as Dial) and clean washcloth.  Sleep in a clean bed with clean clothing.  Do not allow pets to sleep with you.  Shower on the morning of surgery using a fresh bar of anti-bacterial soap (such as Dial) and clean washcloth.  Dry with a clean towel and dress in clean clothing.  Ask your surgeon if you will be receiving antibiotics prior to surgery.  Make sure you, your family, and all healthcare providers clean their hands with soap and water or an alcohol based hand  before caring for you or your wound.    Day of surgery:  Your arrival time is approximately two hours before your scheduled surgery time.  Upon arrival, a Pre-op nurse and Anesthesiologist will review your health history, obtain vital signs, and answer questions you may have.  The only belongings needed at this time will be a list of your home medications and if applicable your C-PAP/BI-PAP machine.  A Pre-op nurse will start an IV and you may receive medication in preparation for surgery, including something to help you relax.     Please be aware that surgery does come with discomfort.  We want to make every effort to control your discomfort so please discuss any uncontrolled symptoms with your nurse.   Your doctor will most likely have prescribed pain medications.      If you are going home after surgery you will receive individualized written care instructions before being discharged.  A responsible adult must drive you to and from the hospital on the day of your surgery and stay with you for 24 hours.  Discharge prescriptions can be filled by the hospital pharmacy during regular pharmacy hours.  If you are having  surgery late in the day/evening your prescription may be e-prescribed to your pharmacy.  Please verify your pharmacy hours or chose a 24 hour pharmacy to avoid not having access to your prescription because your pharmacy has closed for the day.    If you are staying overnight following surgery, you will be transported to your hospital room following the recovery period.  [facility] has all private rooms.    If you have any questions please call Pre-Admission Testing at [phone].  Deductibles and co-payments are collected on the day of service. Please be prepared to pay the required co-pay, deductible or deposit on the day of service as defined by your plan.    Patient Education for Self-Quarantine Process    Following your COVID testing, we strongly recommend that you wear a mask when you are with other people and practice social distancing.   Limit your activities to only required outings.  Wash your hands with soap and water frequently for at least 20 seconds.   Avoid touching your eyes, nose and mouth with unwashed hands.  Do not share anything - utensils, drinking glasses, food from the same bowl.   Sanitize household surfaces daily. Include all high touch areas (door handles, light switches, phones, countertops, etc.)    Call your surgeon immediately if you experience any of the following symptoms:  Sore Throat  Shortness of Breath or difficulty breathing  Cough  Chills  Body soreness or muscle pain  Headache  Fever  New loss of taste or smell  Do not arrive for your surgery ill.  Your procedure will need to be rescheduled to another time.  You will need to call your physician before the day of surgery to avoid any unnecessary exposure to hospital staff as well as other patients.

## 2022-08-18 ENCOUNTER — HOSPITAL ENCOUNTER (OUTPATIENT)
Facility: HOSPITAL | Age: 77
Setting detail: HOSPITAL OUTPATIENT SURGERY
Discharge: HOME | End: 2022-08-18
Attending: STUDENT IN AN ORGANIZED HEALTH CARE EDUCATION/TRAINING PROGRAM | Admitting: STUDENT IN AN ORGANIZED HEALTH CARE EDUCATION/TRAINING PROGRAM

## 2022-08-18 ENCOUNTER — APPOINTMENT (OUTPATIENT)
Dept: GENERAL RADIOLOGY | Facility: HOSPITAL | Age: 77
End: 2022-08-18

## 2022-08-18 ENCOUNTER — ANESTHESIA (OUTPATIENT)
Dept: PERIOP | Facility: HOSPITAL | Age: 77
End: 2022-08-18

## 2022-08-18 ENCOUNTER — HOSPITAL ENCOUNTER (OUTPATIENT)
Dept: MAMMOGRAPHY | Facility: HOSPITAL | Age: 77
Discharge: HOME | End: 2022-08-18

## 2022-08-18 ENCOUNTER — ANESTHESIA EVENT (OUTPATIENT)
Dept: PERIOP | Facility: HOSPITAL | Age: 77
End: 2022-08-18

## 2022-08-18 VITALS
HEART RATE: 70 BPM | DIASTOLIC BLOOD PRESSURE: 60 MMHG | SYSTOLIC BLOOD PRESSURE: 134 MMHG | OXYGEN SATURATION: 99 % | RESPIRATION RATE: 16 BRPM | TEMPERATURE: 97.4 F

## 2022-08-18 DIAGNOSIS — D05.10 DUCTAL CARCINOMA IN SITU (DCIS) OF BREAST, UNSPECIFIED LATERALITY: ICD-10-CM

## 2022-08-18 DIAGNOSIS — D05.10 DUCTAL CARCINOMA IN SITU (DCIS) OF BREAST, UNSPECIFIED LATERALITY: Primary | ICD-10-CM

## 2022-08-18 LAB — GLUCOSE BLDC GLUCOMTR-MCNC: 140 MG/DL (ref 70–130)

## 2022-08-18 PROCEDURE — 88307 TISSUE EXAM BY PATHOLOGIST: CPT | Performed by: STUDENT IN AN ORGANIZED HEALTH CARE EDUCATION/TRAINING PROGRAM

## 2022-08-18 PROCEDURE — 88342 IMHCHEM/IMCYTCHM 1ST ANTB: CPT | Performed by: STUDENT IN AN ORGANIZED HEALTH CARE EDUCATION/TRAINING PROGRAM

## 2022-08-18 PROCEDURE — 88341 IMHCHEM/IMCYTCHM EA ADD ANTB: CPT | Performed by: STUDENT IN AN ORGANIZED HEALTH CARE EDUCATION/TRAINING PROGRAM

## 2022-08-18 PROCEDURE — 25010000002 PROPOFOL 10 MG/ML EMULSION: Performed by: NURSE ANESTHETIST, CERTIFIED REGISTERED

## 2022-08-18 PROCEDURE — 19301 PARTIAL MASTECTOMY: CPT | Performed by: STUDENT IN AN ORGANIZED HEALTH CARE EDUCATION/TRAINING PROGRAM

## 2022-08-18 PROCEDURE — 25010000002 CEFAZOLIN IN DEXTROSE 2-4 GM/100ML-% SOLUTION: Performed by: STUDENT IN AN ORGANIZED HEALTH CARE EDUCATION/TRAINING PROGRAM

## 2022-08-18 PROCEDURE — 82962 GLUCOSE BLOOD TEST: CPT

## 2022-08-18 PROCEDURE — C1819 TISSUE LOCALIZATION-EXCISION: HCPCS

## 2022-08-18 PROCEDURE — 25010000002 MIDAZOLAM PER 1 MG: Performed by: ANESTHESIOLOGY

## 2022-08-18 PROCEDURE — 76098 X-RAY EXAM SURGICAL SPECIMEN: CPT

## 2022-08-18 RX ORDER — SODIUM CHLORIDE 0.9 % (FLUSH) 0.9 %
3 SYRINGE (ML) INJECTION EVERY 12 HOURS SCHEDULED
Status: DISCONTINUED | OUTPATIENT
Start: 2022-08-18 | End: 2022-08-18 | Stop reason: HOSPADM

## 2022-08-18 RX ORDER — MIDAZOLAM HYDROCHLORIDE 1 MG/ML
0.5 INJECTION INTRAMUSCULAR; INTRAVENOUS
Status: DISCONTINUED | OUTPATIENT
Start: 2022-08-18 | End: 2022-08-18 | Stop reason: HOSPADM

## 2022-08-18 RX ORDER — PROMETHAZINE HYDROCHLORIDE 25 MG/1
25 SUPPOSITORY RECTAL ONCE AS NEEDED
Status: DISCONTINUED | OUTPATIENT
Start: 2022-08-18 | End: 2022-08-18 | Stop reason: HOSPADM

## 2022-08-18 RX ORDER — SODIUM CHLORIDE 0.9 % (FLUSH) 0.9 %
3-10 SYRINGE (ML) INJECTION AS NEEDED
Status: DISCONTINUED | OUTPATIENT
Start: 2022-08-18 | End: 2022-08-18 | Stop reason: HOSPADM

## 2022-08-18 RX ORDER — HYDROMORPHONE HYDROCHLORIDE 1 MG/ML
0.5 INJECTION, SOLUTION INTRAMUSCULAR; INTRAVENOUS; SUBCUTANEOUS
Status: DISCONTINUED | OUTPATIENT
Start: 2022-08-18 | End: 2022-08-18 | Stop reason: HOSPADM

## 2022-08-18 RX ORDER — DIAZEPAM 5 MG/1
5 TABLET ORAL ONCE
Status: COMPLETED | OUTPATIENT
Start: 2022-08-18 | End: 2022-08-18

## 2022-08-18 RX ORDER — LIDOCAINE HYDROCHLORIDE 20 MG/ML
INJECTION, SOLUTION INFILTRATION; PERINEURAL AS NEEDED
Status: DISCONTINUED | OUTPATIENT
Start: 2022-08-18 | End: 2022-08-18 | Stop reason: SURG

## 2022-08-18 RX ORDER — FENTANYL CITRATE 50 UG/ML
50 INJECTION, SOLUTION INTRAMUSCULAR; INTRAVENOUS
Status: DISCONTINUED | OUTPATIENT
Start: 2022-08-18 | End: 2022-08-18 | Stop reason: HOSPADM

## 2022-08-18 RX ORDER — ONDANSETRON 2 MG/ML
4 INJECTION INTRAMUSCULAR; INTRAVENOUS ONCE AS NEEDED
Status: DISCONTINUED | OUTPATIENT
Start: 2022-08-18 | End: 2022-08-18 | Stop reason: HOSPADM

## 2022-08-18 RX ORDER — LIDOCAINE HYDROCHLORIDE 10 MG/ML
10 INJECTION, SOLUTION INFILTRATION; PERINEURAL ONCE
Status: DISCONTINUED | OUTPATIENT
Start: 2022-08-18 | End: 2022-08-19 | Stop reason: HOSPADM

## 2022-08-18 RX ORDER — NALOXONE HCL 0.4 MG/ML
0.2 VIAL (ML) INJECTION AS NEEDED
Status: DISCONTINUED | OUTPATIENT
Start: 2022-08-18 | End: 2022-08-18 | Stop reason: HOSPADM

## 2022-08-18 RX ORDER — LABETALOL HYDROCHLORIDE 5 MG/ML
5 INJECTION, SOLUTION INTRAVENOUS
Status: DISCONTINUED | OUTPATIENT
Start: 2022-08-18 | End: 2022-08-18 | Stop reason: HOSPADM

## 2022-08-18 RX ORDER — PROPOFOL 10 MG/ML
VIAL (ML) INTRAVENOUS CONTINUOUS PRN
Status: DISCONTINUED | OUTPATIENT
Start: 2022-08-18 | End: 2022-08-18 | Stop reason: SURG

## 2022-08-18 RX ORDER — SODIUM CHLORIDE, SODIUM LACTATE, POTASSIUM CHLORIDE, CALCIUM CHLORIDE 600; 310; 30; 20 MG/100ML; MG/100ML; MG/100ML; MG/100ML
9 INJECTION, SOLUTION INTRAVENOUS CONTINUOUS
Status: DISCONTINUED | OUTPATIENT
Start: 2022-08-18 | End: 2022-08-18 | Stop reason: HOSPADM

## 2022-08-18 RX ORDER — FAMOTIDINE 10 MG/ML
20 INJECTION, SOLUTION INTRAVENOUS ONCE
Status: COMPLETED | OUTPATIENT
Start: 2022-08-18 | End: 2022-08-18

## 2022-08-18 RX ORDER — PROMETHAZINE HYDROCHLORIDE 25 MG/1
25 TABLET ORAL ONCE AS NEEDED
Status: DISCONTINUED | OUTPATIENT
Start: 2022-08-18 | End: 2022-08-18 | Stop reason: HOSPADM

## 2022-08-18 RX ORDER — FLUMAZENIL 0.1 MG/ML
0.2 INJECTION INTRAVENOUS AS NEEDED
Status: DISCONTINUED | OUTPATIENT
Start: 2022-08-18 | End: 2022-08-18 | Stop reason: HOSPADM

## 2022-08-18 RX ORDER — HYDROCODONE BITARTRATE AND ACETAMINOPHEN 5; 325 MG/1; MG/1
1 TABLET ORAL EVERY 6 HOURS PRN
Qty: 8 TABLET | Refills: 0 | Status: SHIPPED | OUTPATIENT
Start: 2022-08-18 | End: 2022-09-12

## 2022-08-18 RX ORDER — DIPHENHYDRAMINE HYDROCHLORIDE 50 MG/ML
12.5 INJECTION INTRAMUSCULAR; INTRAVENOUS
Status: DISCONTINUED | OUTPATIENT
Start: 2022-08-18 | End: 2022-08-18 | Stop reason: HOSPADM

## 2022-08-18 RX ORDER — HYDRALAZINE HYDROCHLORIDE 20 MG/ML
5 INJECTION INTRAMUSCULAR; INTRAVENOUS
Status: DISCONTINUED | OUTPATIENT
Start: 2022-08-18 | End: 2022-08-18 | Stop reason: HOSPADM

## 2022-08-18 RX ORDER — EPHEDRINE SULFATE 50 MG/ML
5 INJECTION, SOLUTION INTRAVENOUS ONCE AS NEEDED
Status: DISCONTINUED | OUTPATIENT
Start: 2022-08-18 | End: 2022-08-18 | Stop reason: HOSPADM

## 2022-08-18 RX ORDER — IBUPROFEN 600 MG/1
600 TABLET ORAL ONCE AS NEEDED
Status: DISCONTINUED | OUTPATIENT
Start: 2022-08-18 | End: 2022-08-18 | Stop reason: HOSPADM

## 2022-08-18 RX ORDER — OXYCODONE AND ACETAMINOPHEN 7.5; 325 MG/1; MG/1
1 TABLET ORAL EVERY 4 HOURS PRN
Status: DISCONTINUED | OUTPATIENT
Start: 2022-08-18 | End: 2022-08-18 | Stop reason: HOSPADM

## 2022-08-18 RX ORDER — LIDOCAINE HYDROCHLORIDE 10 MG/ML
0.5 INJECTION, SOLUTION EPIDURAL; INFILTRATION; INTRACAUDAL; PERINEURAL ONCE AS NEEDED
Status: DISCONTINUED | OUTPATIENT
Start: 2022-08-18 | End: 2022-08-18 | Stop reason: HOSPADM

## 2022-08-18 RX ORDER — DIPHENHYDRAMINE HCL 25 MG
25 CAPSULE ORAL
Status: DISCONTINUED | OUTPATIENT
Start: 2022-08-18 | End: 2022-08-18 | Stop reason: HOSPADM

## 2022-08-18 RX ORDER — CEFAZOLIN SODIUM 2 G/100ML
2 INJECTION, SOLUTION INTRAVENOUS ONCE
Status: COMPLETED | OUTPATIENT
Start: 2022-08-18 | End: 2022-08-18

## 2022-08-18 RX ORDER — HYDROCODONE BITARTRATE AND ACETAMINOPHEN 7.5; 325 MG/1; MG/1
1 TABLET ORAL ONCE AS NEEDED
Status: DISCONTINUED | OUTPATIENT
Start: 2022-08-18 | End: 2022-08-18 | Stop reason: HOSPADM

## 2022-08-18 RX ADMIN — LIDOCAINE HYDROCHLORIDE 60 MG: 20 INJECTION, SOLUTION INFILTRATION; PERINEURAL at 12:25

## 2022-08-18 RX ADMIN — SODIUM CHLORIDE, POTASSIUM CHLORIDE, SODIUM LACTATE AND CALCIUM CHLORIDE: 600; 310; 30; 20 INJECTION, SOLUTION INTRAVENOUS at 12:19

## 2022-08-18 RX ADMIN — CEFAZOLIN SODIUM 2 G: 2 INJECTION, SOLUTION INTRAVENOUS at 12:12

## 2022-08-18 RX ADMIN — MIDAZOLAM 0.5 MG: 1 INJECTION INTRAMUSCULAR; INTRAVENOUS at 11:33

## 2022-08-18 RX ADMIN — DIAZEPAM 5 MG: 5 TABLET ORAL at 08:32

## 2022-08-18 RX ADMIN — FAMOTIDINE 20 MG: 10 INJECTION INTRAVENOUS at 08:40

## 2022-08-18 RX ADMIN — PROPOFOL 300 MCG/KG/MIN: 10 INJECTION, EMULSION INTRAVENOUS at 12:25

## 2022-08-18 NOTE — ANESTHESIA POSTPROCEDURE EVALUATION
Patient: Olinda Baumann    Procedure Summary     Date: 08/18/22 Room / Location:  [facility]  [facility]    Anesthesia Start: 1219 Anesthesia Stop: 1317    Procedure: left breast wire localized lumpectomy (Left Breast) Diagnosis:       Ductal carcinoma in situ (DCIS) of breast, unspecified laterality      (Ductal carcinoma in situ (DCIS) of breast, unspecified laterality [D05.10])    Surgeons: Sarah Juarez MD Provider: Aaron Callahan MD    Anesthesia Type: general ASA Status: 3          Anesthesia Type: general    Vitals  Vitals Value Taken Time   /60 08/18/22 1345   Temp 36.3 °C (97.4 °F) 08/18/22 1315   Pulse 70 08/18/22 1345   Resp 16 08/18/22 1345   SpO2 99 % 08/18/22 1345           Post Anesthesia Care and Evaluation    Patient location during evaluation: bedside  Patient participation: complete - patient participated  Level of consciousness: awake and alert  Pain management: adequate    Airway patency: patent  Anesthetic complications: No anesthetic complications    Cardiovascular status: acceptable  Respiratory status: acceptable  Hydration status: acceptable    Comments: /60 (BP Location: Left arm, Patient Position: Lying)   Pulse 70   Temp 36.3 °C (97.4 °F) (Oral)   Resp 16   SpO2 99%

## 2022-08-18 NOTE — ANESTHESIA PREPROCEDURE EVALUATION
Anesthesia Evaluation     Patient summary reviewed   history of anesthetic complications: prolonged sedation  NPO Solid Status: > 8 hours  NPO Liquid Status: > 2 hours           Airway   Mallampati: II  TM distance: >3 FB  Neck ROM: full  no difficulty expected  Dental    (+) upper dentures    Comment: Dentures are posted, not loose    Pulmonary     breath sounds clear to auscultation  (+) asthma (used both inh this am),  (-) shortness of breath, recent URI, sleep apnea, not a smoker    ROS comment: Chronic cough;  Pulmonary nodules  PE comment: nonlabored  Cardiovascular   Exercise tolerance: good (4-7 METS)    ECG reviewed  Rhythm: regular  Rate: normal    (+) hypertension, hyperlipidemia,   (-) valvular problems/murmurs, past MI, dysrhythmias, angina      Neuro/Psych  (-) seizures, TIA, CVA  GI/Hepatic/Renal/Endo    (+) obesity,  GERD,  diabetes mellitus type 2 well controlled, thyroid problem (goiter)   (-) liver disease, no renal disease    Musculoskeletal     (+) arthralgias,   (-) neck stiffness  Abdominal    Substance History      OB/GYN      Comment: Endometrial polyp      Other   arthritis,    history of cancer (L breast)                    Anesthesia Plan    ASA 3     general     (LMA)  intravenous induction     Anesthetic plan, risks, benefits, and alternatives have been provided, discussed and informed consent has been obtained with: patient.    Plan discussed with CRNA.

## 2022-08-18 NOTE — H&P
General Surgery Breast Cancer History and Physical Exam      Summary:    Olinda Baumann is a 77 y.o. lady. 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       A multidisciplinary plan has been formulated for the patient:    (1) Breast Surgical Oncology:  -Follow up [employer] 9 panel genetic testing. I will call her with results.   -MRI to evaluate anatomy as well as for surgical planning. Awaiting read.  -Nurse navigator consult.   -Surgical plan: Likely plan for left breast wire localized lumpectomy pending MRI results. She would like to proceed with surgery after her 2 week vacation starting July 30.   -Plastic surgery referral deferred.      (2) Medical Oncology:  -Will refer postoperatively for evaluation for endocrine therapy.     (3) Radiation Oncology:  -Will refer postoperatively for evaluation for radiation therapy as clinically indicated.     Referring Provider: Bill Hwang Jr.,*     Chief Complaint: abnormal breast imaging     History of Present Illness: Ms. Olinda Baumann is a 77 y.o. year old lady, seen at the request of Bill Hwang Jr.* for a new diagnosis of left breast cancer.       This was initially detected as an imaging abnormality. She has had annual mammograms each year. She has no prior history of breast biopsy. Her work-up is detailed in the oncologic history below.      She denies any breast lumps, pain, skin changes, or nipple discharge. She denies any family history of breast or ovarian cancer.      Workup of Current Diagnosis:    4/12/2022 Bilateral Screening Mammogram  IMPRESSION:  1. There are multiple microcalcifications seen in the anterior one-third retroareolar region of the left breast. Further evaluation with spot magnification CC and 90 degree lateral images is recommended.  2. There is an area of focal asymmetry in the middle third lateral aspect of the right breast. Further evaluation  with spot compression CC mammographic and tomosynthesis images and possible targeted right breast sonography is recommended.  BI-RADS Category 0: Incomplete - Needs additional imaging evaluation.     5/12/2022 Bilateral Diagnostic Mammogram   In the right breast in the middle third lateral aspect of the previous described area of focal asymmetry resolves with additional imaging. This is consistent with normal breast parenchyma. No suspicious findings in the right breast are visualized.  In the left breast additional imaging demonstrates the presence of multiple microcalcifications in the cluster with a linear distribution spanning a distance of approximately 1.8 cm seen in the retroareolar region of the left breast at the 6 clock position. There is a question of mild pleomorphism.  IMPRESSION:  1. There is a 1.8 cm cluster of linear microcalcifications in the retroareolar region of the left breast at the 6 o'clock position. Correlation with a stereo-tactic guided left breast biopsy is recommended.  2. There are no findings suspicious for malignancy in the right breast.  BI-RADS Category 4: Suspicious abnormality. Biopsy should be considered.     6/13/2022 Left Breast Stereotactic Biopsy  The calcifications in the anterior left breast was/were localized and targeted under stereotactic/tomosynthesis guidance via a(n) medial approach. 4 core specimens were obtained. Specimen radiography demonstrated the targeted calcifications. A tribell clip was then deployed at the biopsy site. The patient tolerated the procedure well with no immediate complications.  Post-procedural digital mammographic views of the left breast demonstrate the tribell clip at the expected location at the site of biopsy in the lower central anterior left breast at the lateral margin of residual calcifications.  IMPRESSION:  1. Stereotactic/Tomosynthesis guided core needle biopsy of a 1.8 cm group of calcifications at 6:00 in the retroareolar left  breast, marked with a tribell biopsy clip, where there are residual calcifications. Pathology is malignant and concordant with the imaging assessment. Surgical consultation is recommended. Contrast-enhanced breast MRI should be considered to evaluate extent of disease.      2022 Pathology  Final Diagnosis   1. Breast, Left, Retro, 6 O'clock, Biopsy: Ductal carcinoma in situ (DCIS).               A. The ductal carcinoma in situ is of high nuclear grade with comedo, solid and cribriform architecture and                     expansive necrosis.                 B. Microcalcifications are associated with the ductal carcinoma in situ.               C. DCIS measures up to 3 mm on the slide.      2022 Bilateral Breast MRI:   Read pending     Gynecologic History:   . P:0 AB:6  Age at first childbirth: N/A  Lactation/How long: N/A  Age at menarche: 13  Age at menopause: 51  Total years of oral contraceptive use: 5-6 years previously  Total years of hormone replacement therapy: No     Past Medical History:   · DM  · Asthma   · HLD     Past Surgical History:    · Left ankle surgery   · L breast cyst excision, 20 years ago, benign  · Bilateral cataract extraction   · D&C  · R leg skin cancer excision     Family History:    · As above     Social History:  · Denies tobacco use  · Occasional alcohol use     Allergies:         Allergies   Allergen Reactions   • Sulfa Antibiotics Other (See Comments)       Does not remember reaction         Medications:      Current Outpatient Medications:   •  albuterol sulfate  (90 Base) MCG/ACT inhaler, Inhale 2 puffs Every 4 (Four) Hours As Needed for Wheezing., Disp: , Rfl:   •  aspirin 81 MG EC tablet, Take 81 mg by mouth Daily. HELD, Disp: , Rfl:   •  budesonide-formoterol (SYMBICORT) 160-4.5 MCG/ACT inhaler, Inhale 2 puffs 2 (two) times a day., Disp: , Rfl:   •  fexofenadine-pseudoephedrine (ALLEGRA-D 24) 180-240 MG per 24 hr tablet, Take 1 tablet by mouth As Needed for  Allergies., Disp: , Rfl:   •  fluticasone (FLONASE) 50 MCG/ACT nasal spray, 2 sprays into the nostril(s) as directed by provider As Needed for Rhinitis., Disp: , Rfl:   •  glipiZIDE (GLUCOTROL) 5 MG ER tablet, Take 5 mg by mouth Daily., Disp: , Rfl:   •  levocetirizine (XYZAL) 5 MG tablet, Take 5 mg by mouth As Needed., Disp: , Rfl:   •  metFORMIN (GLUCOPHAGE) 500 MG tablet, Take 500 mg by mouth every night at bedtime., Disp: , Rfl:   •  montelukast (SINGULAIR) 10 MG tablet, Take 10 mg by mouth Every Night., Disp: , Rfl:   •  nortriptyline (PAMELOR) 10 MG capsule, Take 1 capsule by mouth At Night As Needed (Diarrhea)., Disp: 90 capsule, Rfl: 3  •  simvastatin (ZOCOR) 10 MG tablet, Take 10 mg by mouth Every Night., Disp: , Rfl:   •  SPIRIVA RESPIMAT 1.25 MCG/ACT aerosol solution inhaler, 2 puffs Daily., Disp: , Rfl:      Laboratory Values:    Labs from 2021 reviewed     Review of Systems:   Influenza-like illness: no fever, no  cough, no  sore throat, no  body aches, no loss of sense of taste or smell, no known exposure to person with Covid-19.  Constitutional: Negative for fevers or chills  HENT: Negative for hearing loss or runny nose  Eyes: Negative for vision changes or scleral icterus  Respiratory: Negative for cough or shortness of breath  Cardiovascular: Negative for chest pain or heart palpitations  Gastrointestinal: Negative for abdominal pain, nausea, vomiting, constipation, melena, or hematochezia  Genitourinary: Negative for hematuria or dysuria  Musculoskeletal: Negative for joint swelling or gait instability  Neurologic: Negative for tremors or seizures  Psychiatric: Negative for suicidal ideations or depression  All other systems reviewed and negative     Physical Exam:   · ECO - Asymptomatic  · Constitutional: Well-developed well-nourished, no acute distress  · Eyes: Conjunctiva normal, sclera nonicteric  · ENMT: Hearing grossly normal, oral mucosa moist  · Neck: Supple, no palpable mass,  trachea midline  · Respiratory: Clear to auscultation, normal inspiratory effort  · Cardiovascular: Regular rate, no peripheral edema, no jugular venous distention  · Breast: symmetric  ? Right: No visible abnormalities on inspection while seated, with arms raised or hands on hips. No masses, skin changes, or nipple abnormalities.  ? Left: No visible abnormalities on inspection while seated, with arms raised or hands on hips. No masses, skin changes, or nipple abnormalities. Moderate bruising at 9:00 2cm FN from biopsy site.  ? Biopsy site appreciated in left breast, otherwise no skin changes.   ? No clinical chest wall involvement.  · Gastrointestinal: Soft, nontender  · Lymphatics (palpable nodes): No cervical, supraclavicular or axillary lymphadenopathy  · Skin:  Warm, dry, no rash on visualized skin surfaces  · Musculoskeletal: Symmetric strength, normal gait  · Psychiatric: Alert and oriented ×3, normal affect      Discussion:  I had an extensive discussion with the patient and her family about the nature of her breast cancer diagnosis. We reviewed the components of breast tissue including ducts and lobules. We reviewed her pathology report in detail. We reviewed breast cancer histology, including stage, grade, ER/NV receptors, HER2 receptors and how this applies to her diagnosis. We reviewed the basics of systemic and local/regional management of breast cancer.      We discussed that most breast cancer is not hereditary, that I do not believe this plays a role in her case, and that genetic testing is not warranted.      We reviewed potential surgical treatments to include partial mastectomy, mastectomy, sentinel lymph node biopsy and axillary node dissection and discussed the rationale associated with each approach. Regarding radiation therapy, we discussed that radiation is indicated in all cases of breast conservation and in only limited circumstances following mastectomy. We discussed that the primary goal  of adjuvant radiation is to decrease the likelihood of local recurrence.      In her case, she is a good candidate for lumpectomy and she would like to proceed with breast conservation. We discussed that lumpectomy would require preoperative wire-localization. We also discussed the risk of positive margins and that she must have negative margins for lumpectomy to be an appropriate oncologic procedure. I will make every effort to obtain negative margins at her initial operation, but there is a 10-15% chance that she will require a second operation for re-excision, or possibly a total mastectomy. We will not know the margin status until after her final pathology has returned.      I described additional risks and potential complications associated with surgery, including, but not limited to, bleeding, infection, deformity/poor cosmetic result, chronic pain, numbness, seroma, hematoma, deep venous thrombosis, skin flap necrosis, disease recurrence and the possibility of requiring additional surgery. We also discussed other treatment options including the option of not undergoing any surgical treatment and the risks associated with this including disease progression. She expressed an understanding of these factors and wished to proceed.     We discussed that in her case, systemic treatment would likely involve endocrine therapy/targeted therapy.      RYAN JOLLEY M.D.  General and Endoscopic Surgery  [facility]     [address], [address]  [place], [place], [address]  P: [phone]  F: [phone]

## 2022-08-18 NOTE — OP NOTE
OPERATIVE REPORT     DATE:  8/18/2022     SURGEON: Sarah Juarez MD      ASSISTANT:  Lou Patricio, who was present for necessary suctioning, retracting, suturing throughout the procedure      OPERATION PERFORMED:  Left breast wire localized lumpectomy     PREOPERATIVE DIAGNOSIS:  DCIS of the left breast      POSTOPERATIVE DIAGNOSIS: Same     ANESTHESIA:  MAC     SPECIMEN:   Left breast wire localized lumpectomy (short stitch superior, long lateral, double anterior)  Additional anterior margin (stitch marks true margin)  Additional posterior margin (stitch marks true margin)  Additional medial margin (stitch marks true margin)  Additional lateral margin (stitch marks true margin)  Additional superior margin (stitch marks true margin)  Additional inferior margin (stitch marks true margin)    DRAINS: None     BLOOD LOSS: Minimal     INDICATION FOR OPERATION:Olinda Baumann is a 77 y.o. lady who was recently diagnosed with DCIS of the left breast.  Left breast wire localized  Lumpectomy was recommended. All risks (including bleeding, infection, damage to surrounding structures, need for further surgery, pathologic upgrade), benefits and alternatives were explained to the patient who agreed and wished to proceed. Informed consent was signed.      OPERATIVE COURSE: The patient was taken to the operating room, transferred onto the operating room table, and underwent anesthesia without incident. The patient was prepped and draped in sterile fashion. A time out was performed and preoperative antibiotics were given.  Half percent Marcaine with epinephrine was injected into the skin and subcutaneous tissues.  A curvilinear incision was made along the breast.  Bovie electrocautery was used to create a flap along the length of the wire 1 cm in thickness.    The tissue was transected medially and laterally along the length of the wire.  It was then transected inferiorly. Lastly the wire was brought through the incision with 2  hemostats.    The superior border was transected. The tissue was amputated at its base.  It was marked as listed above.  Specimen radiograph confirmed clip, wire, and abnormal breast tissue.  It was sent for fresh permanent specimen. Additional margins were taken along all borders. These were done with Allis clamps and approximately 1 cm in thickness. The area was irrigated and appeared hemostatic.  There were no signs of bleeding.      The rest of the local anesthetic was administered. It was closed with interrupted 3-0 Vicryl sutures and a running 4-0 Monocryl suture.  Skin glue was placed over the incision.  The patient tolerated the procedure well. All needle and lap counts were correct at the end of the case. The patient was then awoken from anesthesia and taken to recovery for further monitoring.         Sarah Juarez MD   General and Endoscopic Surgery  [facility]     [address], [address]  [place], [place], [address]  P: [phone]  F: [phone]

## 2022-08-22 ENCOUNTER — TELEPHONE (OUTPATIENT)
Dept: SURGERY | Facility: CLINIC | Age: 77
End: 2022-08-22

## 2022-08-22 DIAGNOSIS — D05.10 DUCTAL CARCINOMA IN SITU (DCIS) OF BREAST, UNSPECIFIED LATERALITY: Primary | ICD-10-CM

## 2022-08-22 LAB
LAB AP CASE REPORT: NORMAL
LAB AP DIAGNOSIS COMMENT: NORMAL
LAB AP SYNOPTIC CHECKLIST: NORMAL
PATH REPORT.FINAL DX SPEC: NORMAL
PATH REPORT.GROSS SPEC: NORMAL

## 2022-09-08 ENCOUNTER — APPOINTMENT (OUTPATIENT)
Dept: LAB | Facility: HOSPITAL | Age: 77
End: 2022-09-08

## 2022-09-12 ENCOUNTER — OFFICE VISIT (OUTPATIENT)
Dept: SURGERY | Facility: CLINIC | Age: 77
End: 2022-09-12

## 2022-09-12 ENCOUNTER — TELEPHONE (OUTPATIENT)
Dept: SURGERY | Facility: CLINIC | Age: 77
End: 2022-09-12

## 2022-09-12 ENCOUNTER — APPOINTMENT (OUTPATIENT)
Dept: RADIATION ONCOLOGY | Facility: HOSPITAL | Age: 77
End: 2022-09-12

## 2022-09-12 ENCOUNTER — CONSULT (OUTPATIENT)
Dept: RADIATION ONCOLOGY | Facility: HOSPITAL | Age: 77
End: 2022-09-12

## 2022-09-12 VITALS
RESPIRATION RATE: 18 BRPM | WEIGHT: 166.8 LBS | OXYGEN SATURATION: 99 % | HEART RATE: 86 BPM | SYSTOLIC BLOOD PRESSURE: 147 MMHG | BODY MASS INDEX: 30.51 KG/M2 | DIASTOLIC BLOOD PRESSURE: 81 MMHG

## 2022-09-12 DIAGNOSIS — D05.12 DUCTAL CARCINOMA IN SITU (DCIS) OF LEFT BREAST: Primary | ICD-10-CM

## 2022-09-12 PROCEDURE — 99205 OFFICE O/P NEW HI 60 MIN: CPT | Performed by: RADIOLOGY

## 2022-09-12 PROCEDURE — G0463 HOSPITAL OUTPT CLINIC VISIT: HCPCS | Performed by: RADIOLOGY

## 2022-09-12 PROCEDURE — 99024 POSTOP FOLLOW-UP VISIT: CPT | Performed by: STUDENT IN AN ORGANIZED HEALTH CARE EDUCATION/TRAINING PROGRAM

## 2022-09-12 NOTE — PROGRESS NOTES
Subjective     Sarah Juarez MD    Cancer Staging  No matching staging information was found for the patient.    CC: DCIS left breast, high grade ER FL pos                                Dear Sarah Juarez MD    I had the pleasure of seeing Olinda Baumann  today in the [facility].   The patient is a 77 y.o. 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  She had a screening mammogram on 22 which showed a focal asymmetry right breast.  She had a bilateral diagnostic mammogram on 22 which showed no suspicious findings in right breast however, a new 1.8cm cluster of linear microcalcifications in retroareolar left breast at 6o'clock, birads 4.  She had a stereotactic biopsy on 22 with pathology revealing DCIS high grade with comedo solid and cribriform architecture with expansive necrosis measuring up to 3mm, ER 99% FL 99%.      She had a breast mri on 22 which showed the biopsy-proven site of DCIS in the left breast in the retroareolar  region at the 6 o'clock position with the Tri-Bell shaped metallic clip located within a postbiopsy cavity that measures up to 1.3 cm in greatest dimension. Only nonspecific enhancement surrounding the cavity is visualized. No evidence for left axillary adenopathy is appreciated and no other suspicious findings are seen within the left breast.     She underwent a left breast lumpectomy on 22 with pathology revealing no residual dcis with rare microcalcifications.    She is  with menarche age 13 and menopause age 51.  She used oral contraceptives for 5 years and has never used hormone replacement.    She has an appointment with Dr. Rasmussen with medical oncology on 9/15/22.  She is referred today for evaluation for adjuvant radiation.       Review of Systems   Constitutional: Negative.    HENT: Negative.    Respiratory: Positive for cough. Negative for apnea and shortness of breath.    Cardiovascular: Negative.     Gastrointestinal: Negative.    Endocrine: Negative.    Genitourinary: Negative.    Skin: Negative.    Neurological: Negative.    Psychiatric/Behavioral: The patient is nervous/anxious.          Past Medical History:   Diagnosis Date   • Arthritis    • Asthma    • Breast cancer (HCC)    • Breast cancer, left (HCC)    • Chronic cough    • Diabetes mellitus (HCC)    • Fibrocystic breast    • GERD (gastroesophageal reflux disease)    • Goiter     INWARD GOITER   • Hearing loss     left   • History of colon polyps    • History of skin cancer    • Hyperlipidemia    • Hypertension     OFF MEDS X  4-5 YEARS   • Lung nodule     FOLLOWED BY DR DARRELL BECERRA WITH REGULAR CT SCANS   • Overactive bladder    • Seasonal allergies    • Vertigo          Past Surgical History:   Procedure Laterality Date   • BREAST CYST EXCISION Left 2002   • BREAST LUMPECTOMY Left 8/18/2022    Procedure: left breast wire localized lumpectomy;  Surgeon: Sarah Juarez MD;  Location: [facility] OR [facility] [place];  Service: General;  Laterality: Left;   • CATARACT EXTRACTION EXTRACAPSULAR W/ INTRAOCULAR LENS IMPLANTATION     • COLONOSCOPY N/A 03/06/2017    TA polyps, IH   • COLONOSCOPY N/A 02/27/2019    Procedure: COLONOSCOPY TO CECUM WITH COLD BIOPSIES;  Surgeon: Marquez Alan MD;  Location: [facility] ENDOSCOPY;  Service: Gastroenterology   • D & C HYSTEROSCOPY N/A 10/14/2020    Procedure: DILATATION AND CURETTAGE HYSTEROSCOPY WITH MYOSURE;  Surgeon: Mariano Calix MD;  Location: [facility] OR [facility] [place];  Service: Obstetrics/Gynecology;  Laterality: N/A;   • DENTAL PROCEDURE     • DILATATION AND CURETTAGE      MULTIPLE D/T MISCARRIAGES   • ENDOSCOPY N/A 06/08/2022    Procedure: ESOPHAGOGASTRODUODENOSCOPY with biopsy;  Surgeon: Marquez Alan MD;  Location: [facility] ENDOSCOPY;  Service: Gastroenterology;  Laterality: N/A;  gastritis   • EYE SURGERY  3/4/2015   • ORIF ANKLE FRACTURE Left     WITH INSTRUMENTATION   • SKIN CANCER EXCISION Right     LEG          Social History     Socioeconomic History   • Marital status:      Spouse name: Kory   Tobacco Use   • Smoking status: Never Smoker   • Smokeless tobacco: Never Used   Vaping Use   • Vaping Use: Never used   Substance and Sexual Activity   • Alcohol use: Yes     Comment: RARELY   • Drug use: Never   • Sexual activity: Yes     Partners: Male     Birth control/protection: Post-menopausal, None         Family History   Problem Relation Age of Onset   • GI problems Mother         ileostomy   • Dementia Mother    • Pancreatic cancer Father    • Arthritis Father    • Diabetes Father    • Malig Hyperthermia Neg Hx    • Breast cancer Neg Hx    • Ovarian cancer Neg Hx           Objective    Physical Exam  Constitutional:       Appearance: Normal appearance.   Eyes:      Extraocular Movements: Extraocular movements intact.   Chest:          Comments: Healing incision left breast, no palpable masses or adenopathy in either breast or axilla  Musculoskeletal:         General: Normal range of motion.      Cervical back: Normal range of motion.   Neurological:      General: No focal deficit present.      Mental Status: She is alert.   Psychiatric:         Mood and Affect: Mood normal.           Current Outpatient Medications on File Prior to Visit   Medication Sig Dispense Refill   • albuterol sulfate  (90 Base) MCG/ACT inhaler Inhale 2 puffs Every 4 (Four) Hours As Needed for Wheezing.     • aspirin 81 MG EC tablet Take 81 mg by mouth Daily.     • budesonide-formoterol (SYMBICORT) 160-4.5 MCG/ACT inhaler Inhale 2 puffs 2 (two) times a day.     • fexofenadine-pseudoephedrine (ALLEGRA-D 24) 180-240 MG per 24 hr tablet Take 1 tablet by mouth As Needed for Allergies.     • fluticasone (FLONASE) 50 MCG/ACT nasal spray 2 sprays into the nostril(s) as directed by provider As Needed for Rhinitis.     • glipiZIDE (GLUCOTROL) 5 MG ER tablet Take 5 mg by mouth Daily.     • levocetirizine (XYZAL) 5 MG tablet Take 5 mg by  mouth As Needed.     • metFORMIN (GLUCOPHAGE) 500 MG tablet Take 500 mg by mouth every night at bedtime.     • montelukast (SINGULAIR) 10 MG tablet Take 10 mg by mouth Every Night.     • nortriptyline (PAMELOR) 10 MG capsule Take 1 capsule by mouth At Night As Needed (Diarrhea). 90 capsule 3   • simvastatin (ZOCOR) 10 MG tablet Take 10 mg by mouth Every Night.     • [DISCONTINUED] Chlorhexidine Gluconate Cloth 2 % pads Apply  topically. PRIOR TO OR     • [DISCONTINUED] HYDROcodone-acetaminophen (Norco) 5-325 MG per tablet Take 1 tablet by mouth Every 6 (Six) Hours As Needed for Mild Pain . 8 tablet 0     No current facility-administered medications on file prior to visit.       ALLERGIES:    Allergies   Allergen Reactions   • Sulfa Antibiotics Other (See Comments)     CHILDHOOD REACTION        There were no vitals taken for this visit.     No flowsheet data found.      Assessment & Plan     77 year old female with high grade dcis ER SD pos, 3mm on biopsy with expansive necrosis and no residual disease on lumpectomy specimen.I discussed with her my recommendation for post-operative radiation therapy to the left breast to decrease the risk of local recurrence.      I discussed with her in detail the risks, benefits and rationale of radiation therapy to the left breast to include but not limited to the following:    Acute: skin erythema, breakdown/moist desquamation, swelling or discomfort of the breast, fatigue, pneumonitis resulting in shortness of breath, cough or pain    Late: Permanent skin changes including hyperpigmentation, telangiectasias, fibrosis of the breast resulting in smaller size or poor cosmetic outcome, late edema or cellulitis, late rib fracture, late pulmonary fibrosis and the remote risk of late cardiac damage resulting in increased risk of heart attack or second malignancies.      She voiced understanding and was given an opportunity to ask questions which I believe were answered to her  satisfaction.  I she and her  would like to think about it and are leaving for [place] for 3 weeks.  I have scheduled her to return on October 12 for re-evalaution and possible CT simulation. I plan to treat the left breast with tangential fields to a dose of approximately 3990cGy in 15 fractions with no boost.     I personally spent greater than 60 minutes today assessing, managing, discussing and documenting my visit with the patient. That time includes review of records, imaging and pathology reports, obtaining my own history, performing a medically appropriate evaluation, counseling and educating the patient, discussing goals, logistics, alternatives and risks of my recommendations, surveillance options and potential outcomes. It also includes the time documenting the clinical information in the EMR and communicating my recommendations to the other involved physicians.                 Thank you very much for allowing me to participate in the care of this very pleasant patient.    Sincerely,      Yessi Harley MD     Subjective     Sarah Juarez MD    Cancer Staging

## 2022-09-12 NOTE — PROGRESS NOTES
General Surgery Breast Cancer History and Physical Exam     Summary:    Olinda Baumann is a 77 y.o. lady who presents with a history of left breast DCIS: Grade III,  ER+/OR+; [id number], Stage 0.      A multidisciplinary plan has been formulated for the patient:    (1) Breast Surgical Oncology:  -S/p  left breast wire localized lumpectomy 8/2022.  -Annual mammogram due 4/2023.   -Follow up 5/2023.    (2) Medical Oncology:  -Appointment with Dr. Rasmussen 9/15/2022    (3) Radiation Oncology:  -Appointment with Dr. Harley today.    Referring Provider: No ref. provider found    Chief Complaint: abnormal breast imaging    History of Present Illness: Ms. Olinda Baumann is a 77 y.o. year old lady, seen at the request of No ref. provider found for a new diagnosis of left breast cancer.      This was initially detected as an imaging abnormality. She has had annual mammograms each year. She has no prior history of breast biopsy. Her work-up is detailed in the oncologic history below.     She denies any breast lumps, pain, skin changes, or nipple discharge. She denies any family history of breast or ovarian cancer.     9/12/2022 she presents today for follow-up.  She is doing very well.  Her pain is controlled.  She has no concerns with her incision.  She complains of a few small aches along the lateral aspect of her chest wall.  He is getting back to her daily activities.    Workup of Current Diagnosis:    4/12/2022 Bilateral Screening Mammogram  IMPRESSION:  1. There are multiple microcalcifications seen in the anterior one-third retroareolar region of the left breast. Further evaluation with spot magnification CC and 90 degree lateral images is recommended.  2. There is an area of focal asymmetry in the middle third lateral aspect of the right breast. Further evaluation with spot compression CC mammographic and tomosynthesis images and possible targeted right breast sonography is recommended.  BI-RADS Category 0: Incomplete  - Needs additional imaging evaluation.    5/12/2022 Bilateral Diagnostic Mammogram   In the right breast in the middle third lateral aspect of the previous described area of focal asymmetry resolves with additional imaging. This is consistent with normal breast parenchyma. No suspicious findings in the right breast are visualized.  In the left breast additional imaging demonstrates the presence of multiple microcalcifications in the cluster with a linear distribution spanning a distance of approximately 1.8 cm seen in the retroareolar region of the left breast at the 6 clock position. There is a question of mild pleomorphism.  IMPRESSION:  1. There is a 1.8 cm cluster of linear microcalcifications in the retroareolar region of the left breast at the 6 o'clock position. Correlation with a stereo-tactic guided left breast biopsy is recommended.  2. There are no findings suspicious for malignancy in the right breast.  BI-RADS Category 4: Suspicious abnormality. Biopsy should be considered.    6/13/2022 Left Breast Stereotactic Biopsy  The calcifications in the anterior left breast was/were localized and targeted under stereotactic/tomosynthesis guidance via a(n) medial approach. 4 core specimens were obtained. Specimen radiography demonstrated the targeted calcifications. A tribell clip was then deployed at the biopsy site. The patient tolerated the procedure well with no immediate complications.  Post-procedural digital mammographic views of the left breast demonstrate the tribell clip at the expected location at the site of biopsy in the lower central anterior left breast at the lateral margin of residual calcifications.  IMPRESSION:  1. Stereotactic/Tomosynthesis guided core needle biopsy of a 1.8 cm group of calcifications at 6:00 in the retroareolar left breast, marked with a tribell biopsy clip, where there are residual calcifications. Pathology is malignant and concordant with the imaging assessment. Surgical  consultation is recommended. Contrast-enhanced breast MRI should be considered to evaluate extent of disease.     6/13/2022 Pathology  Final Diagnosis   1. Breast, Left, Retro, 6 O'clock, Biopsy: Ductal carcinoma in situ (DCIS).               A. The ductal carcinoma in situ is of high nuclear grade with comedo, solid and cribriform architecture and                     expansive necrosis.                 B. Microcalcifications are associated with the ductal carcinoma in situ.               C. DCIS measures up to 3 mm on the slide.     7/2/2022 Bilateral Breast MRI:   IMPRESSION:  1. Biopsy-proven site of DCIS in the left breast in the retroareolar region at the 6 o'clock position with the Tri-Bell shaped metallic clip located within a postbiopsy cavity that measures up to 1.3 cm in greatest dimension. Only nonspecific enhancement surrounding the cavity is visualized. No evidence for left axillary adenopathy is appreciated and no other suspicious findings are seen within the left breast. If breast conservation therapy is desired then surgical excision of any  remaining suspicious microcalcifications at the biopsy site is recommended.  2. There are no findings suspicious for malignancy in the right breast.   BI-RADS CATEGORY 6: Known biopsy-proven malignancy.    8/18/2022 Left breast wire localized lumpectomy  Final Diagnosis   1. Left Breast, Needle Localization Lumpectomy:               A. Biopsy site changes are identified and metallic clip retrieved.               B. No ductal carcinoma in-situ nor invasive carcinoma identified.               C. No lobular neoplasia (LCIS, ALH) identified (see comment).               D. Scattered adenosis, sclerosing adenosis, fibroadenomatous hyperplasia, fibrocystic change,                       usual ductal hyperplasia and focal clustered ductal cysts identified.  E. Rare calcification present in benign breast tissue.  F. Surgical margins are viable and negative for  malignancy.  G. Rare microcalcification noted within benign breast tissue.         2. Left Breast, Additional Superior Margin:                 A. No atypical hyperplasia, in-situ carcinoma nor invasive carcinoma identified.               B. New margin is negative for malignancy by additional 8 mm.     3. Left Breast, Additional Inferior Margin:                 A. No atypical hyperplasia, in-situ carcinoma nor invasive carcinoma identified.               B. New margin is negative for malignancy by additional 10 mm.     4. Left Breast, Additional Medial Margin:                 A. No atypical hyperplasia, in-situ carcinoma nor invasive carcinoma identified.               B. New margin is negative for malignancy by additional 7 mm.     5. Left Breast, Additional Lateral Margin:                 A. No atypical hyperplasia, in-situ carcinoma nor invasive carcinoma identified.               B. New margin is negative for malignancy by additional 6 mm.     6. Left Breast, Additional Anterior Margin:                 A. No atypical hyperplasia, in-situ carcinoma nor invasive carcinoma identified.               B. New margin is negative for malignancy by additional 12 mm.     7. Left Breast, Additional Posterior Margin:                 A. No atypical hyperplasia, in-situ carcinoma nor invasive carcinoma identified.               B. New margin is negative for malignancy by additional 12 mm.       Gynecologic History:   . P:0 AB:6  Age at first childbirth: N/A  Lactation/How long: N/A  Age at menarche: 13  Age at menopause: 51  Total years of oral contraceptive use: 5-6 years previously  Total years of hormone replacement therapy: No    Past Medical History:   • DM  • Asthma   • HLD    Past Surgical History:    • Left ankle surgery   • L breast cyst excision, 20 years ago, benign  • Bilateral cataract extraction   • D&C  • R leg skin cancer excision    Family History:    • As above    Social History:  • Denies tobacco  use  • Occasional alcohol use    Allergies:   Allergies   Allergen Reactions   • Sulfa Antibiotics Other (See Comments)     CHILDHOOD REACTION        Medications:     Current Outpatient Medications:   •  albuterol sulfate  (90 Base) MCG/ACT inhaler, Inhale 2 puffs Every 4 (Four) Hours As Needed for Wheezing., Disp: , Rfl:   •  aspirin 81 MG EC tablet, Take 81 mg by mouth Daily. HELD FOR OR, Disp: , Rfl:   •  budesonide-formoterol (SYMBICORT) 160-4.5 MCG/ACT inhaler, Inhale 2 puffs 2 (two) times a day., Disp: , Rfl:   •  Chlorhexidine Gluconate Cloth 2 % pads, Apply  topically. PRIOR TO OR, Disp: , Rfl:   •  fexofenadine-pseudoephedrine (ALLEGRA-D 24) 180-240 MG per 24 hr tablet, Take 1 tablet by mouth As Needed for Allergies., Disp: , Rfl:   •  fluticasone (FLONASE) 50 MCG/ACT nasal spray, 2 sprays into the nostril(s) as directed by provider As Needed for Rhinitis., Disp: , Rfl:   •  glipiZIDE (GLUCOTROL) 5 MG ER tablet, Take 5 mg by mouth Daily., Disp: , Rfl:   •  HYDROcodone-acetaminophen (Norco) 5-325 MG per tablet, Take 1 tablet by mouth Every 6 (Six) Hours As Needed for Mild Pain ., Disp: 8 tablet, Rfl: 0  •  levocetirizine (XYZAL) 5 MG tablet, Take 5 mg by mouth As Needed., Disp: , Rfl:   •  metFORMIN (GLUCOPHAGE) 500 MG tablet, Take 500 mg by mouth every night at bedtime., Disp: , Rfl:   •  montelukast (SINGULAIR) 10 MG tablet, Take 10 mg by mouth Every Night., Disp: , Rfl:   •  nortriptyline (PAMELOR) 10 MG capsule, Take 1 capsule by mouth At Night As Needed (Diarrhea)., Disp: 90 capsule, Rfl: 3  •  simvastatin (ZOCOR) 10 MG tablet, Take 10 mg by mouth Every Night., Disp: , Rfl:     Laboratory Values:    Labs from 8/2022 reviewed    Review of Systems:   Influenza-like illness: no fever, no  cough, no  sore throat, no  body aches, no loss of sense of taste or smell, no known exposure to person with Covid-19.  Constitutional: Negative for fevers or chills  HENT: Negative for hearing loss or runny  nose  Eyes: Negative for vision changes or scleral icterus  Respiratory: Negative for cough or shortness of breath  Cardiovascular: Negative for chest pain or heart palpitations  Gastrointestinal: Negative for abdominal pain, nausea, vomiting, constipation, melena, or hematochezia  Genitourinary: Negative for hematuria or dysuria  Musculoskeletal: Negative for joint swelling or gait instability  Neurologic: Negative for tremors or seizures  Psychiatric: Negative for suicidal ideations or depression  All other systems reviewed and negative    Physical Exam:   • ECO - Asymptomatic  • Constitutional: Well-developed well-nourished, no acute distress  • Eyes: Conjunctiva normal, sclera nonicteric  • ENMT: Hearing grossly normal, oral mucosa moist  • Neck: Supple, no palpable mass, trachea midline  • Respiratory: Clear to auscultation, normal inspiratory effort  • Cardiovascular: Regular rate, no peripheral edema, no jugular venous distention  • Breast: symmetric  o Right: No visible abnormalities on inspection while seated, with arms raised or hands on hips. No masses, skin changes, or nipple abnormalities.  o Left: No visible abnormalities on inspection while seated, with arms raised or hands on hips. No masses, skin changes, or nipple abnormalities.  Left breast periareolar incision at the 6 o'clock position clean and dry with no erythema or drainage, glue intact.  o No clinical chest wall involvement.  • Gastrointestinal: Soft, nontender  • Lymphatics (palpable nodes): No cervical, supraclavicular or axillary lymphadenopathy  • Skin:  Warm, dry, no rash on visualized skin surfaces  • Musculoskeletal: Symmetric strength, normal gait  • Psychiatric: Alert and oriented ×3, normal affect     RYAN JOLLEY M.D.  General and Endoscopic Surgery  [facility]    [address] [place] [address], [address]  [place], [place], [address]  P: [phone]  F: [phone]

## 2022-09-12 NOTE — TELEPHONE ENCOUNTER
PT'S FOLLOW-UP APPT W/SCHEDULED WITH DR JOLLEY INSTEAD OF JILL[facility] BELTRE. SPOKE TO PT TO INFORM THAT HER APPT HAS BEEN MOVED TO 5/10/23 @ 8AM W/JILLIAN.

## 2022-09-14 NOTE — PROGRESS NOTES
REASON FOR CONSULTATION: DCIS of left breast.  Provide an opinion on any further workup or treatment                             REQUESTING PHYSICIAN: Bill Hwang MD, Viviane Juarez MD, Yessi Harley MD.    RECORDS OBTAINED:  Records of the patients history including those obtained from the referring provider were reviewed and summarized in detail.    HISTORY OF PRESENT ILLNESS:  The patient is a 77 y.o. year old female who is here for an opinion about the above issue.    History of Present Illness   The patient 77-year-old female followed by primary care with the below medical disorders including hypertension, hyperlipidemia, diabetes mellitus and GE reflux as well as rotator cuff tear 3/18/2022 status post steroid injection therapy.  She also underwent EGD 6/8/2022 with normal findings, antral gastritis noted and several small diminutive gastric polyps.    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  ER 99%, ID 99%.    The patient was seen by Dr. Juarez of general surgery in then proceeded with [employer] 9 panel genetic testing, subsequent MRI 7/2/2022 demonstrating her biopsy-proven site of DCIS up to 1.3 cm, nonspecific enhancement surrounding the cavity and no other findings that might suggest further malignancy including the right breast.    The patient 8/18/2022 underwent a left breast localized lumpectomy with pathology revealing left breast now without additional DCIS or LCIS or ALH   determined- PTisNxMx.    The patient was seen 9/12/2022 for radiation therapy consultation with a [place]  vacation planned in approximately 3 weeks and the patient to return just after her trip in mid October 2022 with plans to proceed with [address] in 15 fractions.    She is now seen in office 9/14/2022 for consideration of adjuvant therapy.    Additional historical point includes uterine abnormalities noted 7/16/2021 on ultrasound pelvis and transvaginal ultrasound leading to endometrial biopsy that was negative.    Past Medical History:   Diagnosis Date   • Arthritis    • Asthma    • Breast cancer (HCC)    • Breast cancer, left (HCC)    • Chronic cough    • Diabetes mellitus (HCC)    • Fibrocystic breast    • GERD (gastroesophageal reflux disease)    • Goiter     INWARD GOITER   • Hearing loss     left   • History of colon polyps    • History of skin cancer    • Hyperlipidemia    • Hypertension     OFF MEDS X  4-5 YEARS   • Lung nodule     FOLLOWED BY DR RAMÍREZ - HERNAN WITH REGULAR CT SCANS   • Overactive bladder    • Seasonal allergies    • Vertigo         Past Surgical History:   Procedure Laterality Date   • BREAST CYST EXCISION Left 2002   • BREAST LUMPECTOMY Left 8/18/2022    Procedure: left breast wire localized lumpectomy;  Surgeon: Sarah Juarez MD;  Location: [facility] OR [facility] [place];  Service: General;  Laterality: Left;   • CATARACT EXTRACTION EXTRACAPSULAR W/ INTRAOCULAR LENS IMPLANTATION     • COLONOSCOPY N/A 03/06/2017    TA polyps, IH   • COLONOSCOPY N/A 02/27/2019    Procedure: COLONOSCOPY TO CECUM WITH COLD BIOPSIES;  Surgeon: Marquez Alan MD;  Location: [facility] ENDOSCOPY;  Service: Gastroenterology   • D & C HYSTEROSCOPY N/A 10/14/2020    Procedure: DILATATION AND CURETTAGE HYSTEROSCOPY WITH MYOSURE;  Surgeon: Mariano Calix MD;  Location: [facility] OR [facility];  Service: Obstetrics/Gynecology;  Laterality: N/A;   • DENTAL PROCEDURE     • DILATATION AND CURETTAGE      MULTIPLE D/T MISCARRIAGES   • ENDOSCOPY N/A 06/08/2022    Procedure: ESOPHAGOGASTRODUODENOSCOPY with biopsy;  Surgeon: Marquez Alan  MD MARY;  Location: [facility] ENDOSCOPY;  Service: Gastroenterology;  Laterality: N/A;  gastritis   • EYE SURGERY  3/4/2015   • ORIF ANKLE FRACTURE Left     WITH INSTRUMENTATION   • SKIN CANCER EXCISION Right     LEG        Current Outpatient Medications on File Prior to Visit   Medication Sig Dispense Refill   • albuterol sulfate  (90 Base) MCG/ACT inhaler Inhale 2 puffs Every 4 (Four) Hours As Needed for Wheezing.     • aspirin 81 MG EC tablet Take 81 mg by mouth Daily.     • budesonide-formoterol (SYMBICORT) 160-4.5 MCG/ACT inhaler Inhale 2 puffs 2 (two) times a day.     • fexofenadine-pseudoephedrine (ALLEGRA-D 24) 180-240 MG per 24 hr tablet Take 1 tablet by mouth As Needed for Allergies.     • fluticasone (FLONASE) 50 MCG/ACT nasal spray 2 sprays into the nostril(s) as directed by provider As Needed for Rhinitis.     • glipiZIDE (GLUCOTROL) 5 MG ER tablet Take 5 mg by mouth Daily.     • levocetirizine (XYZAL) 5 MG tablet Take 5 mg by mouth As Needed.     • metFORMIN (GLUCOPHAGE) 500 MG tablet Take 500 mg by mouth every night at bedtime.     • montelukast (SINGULAIR) 10 MG tablet Take 10 mg by mouth Every Night.     • nortriptyline (PAMELOR) 10 MG capsule Take 1 capsule by mouth At Night As Needed (Diarrhea). 90 capsule 3   • simvastatin (ZOCOR) 10 MG tablet Take 10 mg by mouth Every Night.       No current facility-administered medications on file prior to visit.        ALLERGIES:    Allergies   Allergen Reactions   • Sulfa Antibiotics Other (See Comments)     CHILDHOOD REACTION         Social History     Socioeconomic History   • Marital status:      Spouse name: Kory   Tobacco Use   • Smoking status: Never Smoker   • Smokeless tobacco: Never Used   Vaping Use   • Vaping Use: Never used   Substance and Sexual Activity   • Alcohol use: Yes     Comment: RARELY   • Drug use: Never   • Sexual activity: Yes     Partners: Male     Birth control/protection: Post-menopausal, None        Family History  "  Problem Relation Age of Onset   • GI problems Mother         ileostomy   • Dementia Mother    • Pancreatic cancer Father    • Arthritis Father    • Diabetes Father    • Malig Hyperthermia Neg Hx    • Breast cancer Neg Hx    • Ovarian cancer Neg Hx         Review of Systems   Constitutional: Negative.    HENT: Negative.    Eyes: Negative.    Respiratory: Negative.    Cardiovascular: Negative.    Gastrointestinal: Negative.    Endocrine: Negative.    Genitourinary: Negative.    Musculoskeletal: Negative.    Skin: Negative.    Allergic/Immunologic: Negative.    Neurological: Negative.    Hematological: Negative.    Psychiatric/Behavioral: Negative.         Objective     Vitals:    09/15/22 1032   BP: 155/77   Pulse: 86   Resp: 18   Temp: 98 °F (36.7 °C)   TempSrc: Temporal   SpO2: 99%   Weight: 75 kg (165 lb 4.8 oz)   Height: 160 cm (63\")   PainSc: 0-No pain     Current Status 9/15/2022   ECOG score 0       Physical Exam  Constitutional:       Appearance: Normal appearance.   HENT:      Head: Normocephalic and atraumatic.      Nose: Nose normal.      Mouth/Throat:      Mouth: Mucous membranes are moist.      Pharynx: Oropharynx is clear.   Eyes:      Extraocular Movements: Extraocular movements intact.      Conjunctiva/sclera: Conjunctivae normal.      Pupils: Pupils are equal, round, and reactive to light.   Cardiovascular:      Rate and Rhythm: Normal rate and regular rhythm.      Pulses: Normal pulses.      Heart sounds: Normal heart sounds.   Pulmonary:      Effort: Pulmonary effort is normal.      Breath sounds: Normal breath sounds.      Comments: Status post left breast lumpectomy, well-healed, mild erythema, no additional axillary adenopathy.  Abdominal:      General: Bowel sounds are normal.      Palpations: Abdomen is soft.   Musculoskeletal:         General: Normal range of motion.      Cervical back: Normal range of motion and neck supple.   Skin:     General: Skin is warm and dry.   Neurological:      " General: No focal deficit present.      Mental Status: She is oriented to person, place, and time.   Psychiatric:         Mood and Affect: Mood normal.         Behavior: Behavior normal.           RECENT LABS:  Hematology WBC   Date Value Ref Range Status   09/15/2022 7.62 3.40 - 10.80 10*3/mm3 Final     RBC   Date Value Ref Range Status   09/15/2022 4.77 3.77 - 5.28 10*6/mm3 Final     Hemoglobin   Date Value Ref Range Status   09/15/2022 13.5 12.0 - 15.9 g/dL Final     Hematocrit   Date Value Ref Range Status   09/15/2022 39.6 34.0 - 46.6 % Final     Platelets   Date Value Ref Range Status   09/15/2022 223 140 - 450 10*3/mm3 Final          Assessment & Plan        77-year-old female with history of hypertension, hyperlipidemia, diabetes, GE reflux, rotator cuff injury as well as additional history of recent EGD with benign findings and the previous endometrial hyperplasia (please see record).      The patient underwent screening mammography/12/22 with microcalcifications seen in the anterior one third of the retroareolar region left breast and stereotactic biopsy 6/13 showed DCIS of high nuclear grade strongly ER/SC positive.  She was seen by general surgery-Dr. Juarez-with MRI demonstrating the biopsy-proven site of DCIS and no other findings that might suggest malignancy in the left breast or right breast.  She underwent lumpectomy 8/18/2022 without residual tumor determined-stage - PTisNxMx.       The patient been seen by radiation therapy and is considering as to whether she will undergo this therapy.        We are asked to see her for adjuvant therapy concerns and have discussed endocrine therapy (tamoxifen versus AI therapy) that could reduce considerably her risk of invasive or noninvasive breast cancer.       After additional discussion particularly centering on her previous history including her endometrial hyperplasia it would seem prudent to have her undergo a bone density to determine her bone density  status before we make a decision about adjuvant endocrine therapy of which she is considering as a total treatment rather than additional radiation therapy at this point.    Plan:  *Patient return to laboratory for baseline serum chemistries today-[facility]    *Bone density in approximately 4 weeks after patient returns from [place] vacation    *MD follow-up in 5 weeks    *Patient and her  agreeable to this plan and follow-up.

## 2022-09-15 ENCOUNTER — LAB (OUTPATIENT)
Dept: LAB | Facility: HOSPITAL | Age: 77
End: 2022-09-15

## 2022-09-15 ENCOUNTER — CONSULT (OUTPATIENT)
Dept: ONCOLOGY | Facility: CLINIC | Age: 77
End: 2022-09-15

## 2022-09-15 VITALS
TEMPERATURE: 98 F | SYSTOLIC BLOOD PRESSURE: 155 MMHG | HEART RATE: 86 BPM | DIASTOLIC BLOOD PRESSURE: 77 MMHG | BODY MASS INDEX: 29.29 KG/M2 | RESPIRATION RATE: 18 BRPM | WEIGHT: 165.3 LBS | OXYGEN SATURATION: 99 % | HEIGHT: 63 IN

## 2022-09-15 DIAGNOSIS — Z78.0 ASYMPTOMATIC MENOPAUSAL STATE: ICD-10-CM

## 2022-09-15 DIAGNOSIS — C50.919 MALIGNANT NEOPLASM OF FEMALE BREAST, UNSPECIFIED ESTROGEN RECEPTOR STATUS, UNSPECIFIED LATERALITY, UNSPECIFIED SITE OF BREAST: Primary | ICD-10-CM

## 2022-09-15 DIAGNOSIS — D05.12 DUCTAL CARCINOMA IN SITU (DCIS) OF LEFT BREAST: Primary | ICD-10-CM

## 2022-09-15 LAB
ALBUMIN SERPL-MCNC: 4.2 G/DL (ref 3.5–5.2)
ALBUMIN/GLOB SERPL: 1.4 G/DL (ref 1.1–2.4)
ALP SERPL-CCNC: 86 U/L (ref 38–116)
ALT SERPL W P-5'-P-CCNC: 15 U/L (ref 0–33)
ANION GAP SERPL CALCULATED.3IONS-SCNC: 14.6 MMOL/L (ref 5–15)
AST SERPL-CCNC: 18 U/L (ref 0–32)
BASOPHILS # BLD AUTO: 0.02 10*3/MM3 (ref 0–0.2)
BASOPHILS NFR BLD AUTO: 0.3 % (ref 0–1.5)
BILIRUB SERPL-MCNC: 0.4 MG/DL (ref 0.2–1.2)
BUN SERPL-MCNC: 9 MG/DL (ref 6–20)
BUN/CREAT SERPL: 13.6 (ref 7.3–30)
CALCIUM SPEC-SCNC: 9.7 MG/DL (ref 8.5–10.2)
CHLORIDE SERPL-SCNC: 106 MMOL/L (ref 98–107)
CO2 SERPL-SCNC: 21.4 MMOL/L (ref 22–29)
CREAT SERPL-MCNC: 0.66 MG/DL (ref 0.6–1.1)
DEPRECATED RDW RBC AUTO: 41.9 FL (ref 37–54)
EGFRCR SERPLBLD CKD-EPI 2021: 91 ML/MIN/1.73
EOSINOPHIL # BLD AUTO: 0.08 10*3/MM3 (ref 0–0.4)
EOSINOPHIL NFR BLD AUTO: 1 % (ref 0.3–6.2)
ERYTHROCYTE [DISTWIDTH] IN BLOOD BY AUTOMATED COUNT: 13.7 % (ref 12.3–15.4)
GLOBULIN UR ELPH-MCNC: 2.9 GM/DL (ref 1.8–3.5)
GLUCOSE SERPL-MCNC: 135 MG/DL (ref 74–124)
HCT VFR BLD AUTO: 39.6 % (ref 34–46.6)
HGB BLD-MCNC: 13.5 G/DL (ref 12–15.9)
IMM GRANULOCYTES # BLD AUTO: 0.02 10*3/MM3 (ref 0–0.05)
IMM GRANULOCYTES NFR BLD AUTO: 0.3 % (ref 0–0.5)
LYMPHOCYTES # BLD AUTO: 2.63 10*3/MM3 (ref 0.7–3.1)
LYMPHOCYTES NFR BLD AUTO: 34.5 % (ref 19.6–45.3)
MCH RBC QN AUTO: 28.3 PG (ref 26.6–33)
MCHC RBC AUTO-ENTMCNC: 34.1 G/DL (ref 31.5–35.7)
MCV RBC AUTO: 83 FL (ref 79–97)
MONOCYTES # BLD AUTO: 0.71 10*3/MM3 (ref 0.1–0.9)
MONOCYTES NFR BLD AUTO: 9.3 % (ref 5–12)
NEUTROPHILS NFR BLD AUTO: 4.16 10*3/MM3 (ref 1.7–7)
NEUTROPHILS NFR BLD AUTO: 54.6 % (ref 42.7–76)
NRBC BLD AUTO-RTO: 0 /100 WBC (ref 0–0.2)
PLATELET # BLD AUTO: 223 10*3/MM3 (ref 140–450)
PMV BLD AUTO: 8.5 FL (ref 6–12)
POTASSIUM SERPL-SCNC: 4.2 MMOL/L (ref 3.5–4.7)
PROT SERPL-MCNC: 7.1 G/DL (ref 6.3–8)
RBC # BLD AUTO: 4.77 10*6/MM3 (ref 3.77–5.28)
SODIUM SERPL-SCNC: 142 MMOL/L (ref 134–145)
WBC NRBC COR # BLD: 7.62 10*3/MM3 (ref 3.4–10.8)

## 2022-09-15 PROCEDURE — 85025 COMPLETE CBC W/AUTO DIFF WBC: CPT

## 2022-09-15 PROCEDURE — 36415 COLL VENOUS BLD VENIPUNCTURE: CPT

## 2022-09-15 PROCEDURE — 99204 OFFICE O/P NEW MOD 45 MIN: CPT | Performed by: INTERNAL MEDICINE

## 2022-09-15 PROCEDURE — 80053 COMPREHEN METABOLIC PANEL: CPT | Performed by: INTERNAL MEDICINE

## 2022-09-22 ENCOUNTER — APPOINTMENT (OUTPATIENT)
Dept: BONE DENSITY | Facility: HOSPITAL | Age: 77
End: 2022-09-22

## 2022-10-17 ENCOUNTER — HOSPITAL ENCOUNTER (OUTPATIENT)
Dept: BONE DENSITY | Facility: HOSPITAL | Age: 77
Discharge: HOME | End: 2022-10-17
Admitting: INTERNAL MEDICINE

## 2022-10-17 DIAGNOSIS — Z78.0 ASYMPTOMATIC MENOPAUSAL STATE: ICD-10-CM

## 2022-10-17 DIAGNOSIS — D05.12 DUCTAL CARCINOMA IN SITU (DCIS) OF LEFT BREAST: ICD-10-CM

## 2022-10-17 PROCEDURE — 77080 DXA BONE DENSITY AXIAL: CPT

## 2022-10-28 ENCOUNTER — LAB (OUTPATIENT)
Dept: LAB | Facility: HOSPITAL | Age: 77
End: 2022-10-28

## 2022-10-28 ENCOUNTER — OFFICE VISIT (OUTPATIENT)
Dept: ONCOLOGY | Facility: CLINIC | Age: 77
End: 2022-10-28

## 2022-10-28 VITALS
HEART RATE: 80 BPM | OXYGEN SATURATION: 98 % | HEIGHT: 63 IN | WEIGHT: 166.4 LBS | DIASTOLIC BLOOD PRESSURE: 82 MMHG | RESPIRATION RATE: 18 BRPM | SYSTOLIC BLOOD PRESSURE: 142 MMHG | TEMPERATURE: 98 F | BODY MASS INDEX: 29.48 KG/M2

## 2022-10-28 DIAGNOSIS — D05.12 DUCTAL CARCINOMA IN SITU (DCIS) OF LEFT BREAST: Primary | ICD-10-CM

## 2022-10-28 LAB
ALBUMIN SERPL-MCNC: 4 G/DL (ref 3.5–5.2)
ALBUMIN/GLOB SERPL: 1.3 G/DL (ref 1.1–2.4)
ALP SERPL-CCNC: 87 U/L (ref 38–116)
ALT SERPL W P-5'-P-CCNC: 14 U/L (ref 0–33)
ANION GAP SERPL CALCULATED.3IONS-SCNC: 11.2 MMOL/L (ref 5–15)
AST SERPL-CCNC: 15 U/L (ref 0–32)
BASOPHILS # BLD AUTO: 0.04 10*3/MM3 (ref 0–0.2)
BASOPHILS NFR BLD AUTO: 0.5 % (ref 0–1.5)
BILIRUB SERPL-MCNC: 0.2 MG/DL (ref 0.2–1.2)
BUN SERPL-MCNC: 9 MG/DL (ref 6–20)
BUN/CREAT SERPL: 13.2 (ref 7.3–30)
CALCIUM SPEC-SCNC: 9.7 MG/DL (ref 8.5–10.2)
CHLORIDE SERPL-SCNC: 104 MMOL/L (ref 98–107)
CO2 SERPL-SCNC: 24.8 MMOL/L (ref 22–29)
CREAT SERPL-MCNC: 0.68 MG/DL (ref 0.6–1.1)
DEPRECATED RDW RBC AUTO: 42.2 FL (ref 37–54)
EGFRCR SERPLBLD CKD-EPI 2021: 90.4 ML/MIN/1.73
EOSINOPHIL # BLD AUTO: 0.11 10*3/MM3 (ref 0–0.4)
EOSINOPHIL NFR BLD AUTO: 1.3 % (ref 0.3–6.2)
ERYTHROCYTE [DISTWIDTH] IN BLOOD BY AUTOMATED COUNT: 13.5 % (ref 12.3–15.4)
GLOBULIN UR ELPH-MCNC: 3.1 GM/DL (ref 1.8–3.5)
GLUCOSE SERPL-MCNC: 161 MG/DL (ref 74–124)
HCT VFR BLD AUTO: 39.6 % (ref 34–46.6)
HGB BLD-MCNC: 12.7 G/DL (ref 12–15.9)
IMM GRANULOCYTES # BLD AUTO: 0.02 10*3/MM3 (ref 0–0.05)
IMM GRANULOCYTES NFR BLD AUTO: 0.2 % (ref 0–0.5)
LYMPHOCYTES # BLD AUTO: 2.92 10*3/MM3 (ref 0.7–3.1)
LYMPHOCYTES NFR BLD AUTO: 34.4 % (ref 19.6–45.3)
MCH RBC QN AUTO: 27.6 PG (ref 26.6–33)
MCHC RBC AUTO-ENTMCNC: 32.1 G/DL (ref 31.5–35.7)
MCV RBC AUTO: 86.1 FL (ref 79–97)
MONOCYTES # BLD AUTO: 0.78 10*3/MM3 (ref 0.1–0.9)
MONOCYTES NFR BLD AUTO: 9.2 % (ref 5–12)
NEUTROPHILS NFR BLD AUTO: 4.63 10*3/MM3 (ref 1.7–7)
NEUTROPHILS NFR BLD AUTO: 54.4 % (ref 42.7–76)
NRBC BLD AUTO-RTO: 0 /100 WBC (ref 0–0.2)
PLATELET # BLD AUTO: 236 10*3/MM3 (ref 140–450)
PMV BLD AUTO: 8.3 FL (ref 6–12)
POTASSIUM SERPL-SCNC: 4.3 MMOL/L (ref 3.5–4.7)
PROT SERPL-MCNC: 7.1 G/DL (ref 6.3–8)
RBC # BLD AUTO: 4.6 10*6/MM3 (ref 3.77–5.28)
SODIUM SERPL-SCNC: 140 MMOL/L (ref 134–145)
WBC NRBC COR # BLD: 8.5 10*3/MM3 (ref 3.4–10.8)

## 2022-10-28 PROCEDURE — 99214 OFFICE O/P EST MOD 30 MIN: CPT | Performed by: INTERNAL MEDICINE

## 2022-10-28 PROCEDURE — 36415 COLL VENOUS BLD VENIPUNCTURE: CPT

## 2022-10-28 PROCEDURE — 85025 COMPLETE CBC W/AUTO DIFF WBC: CPT

## 2022-10-28 PROCEDURE — 80053 COMPREHEN METABOLIC PANEL: CPT

## 2022-10-28 RX ORDER — ANASTROZOLE 1 MG/1
1 TABLET ORAL DAILY
Qty: 30 TABLET | Refills: 1 | Status: SHIPPED | OUTPATIENT
Start: 2022-10-28 | End: 2022-11-21 | Stop reason: SDUPTHER

## 2022-10-28 NOTE — PROGRESS NOTES
REASON FOR FOLLOW-UP: DCIS of left breast.      History of Present Illness   The patient 77-year-old female followed by primary care with the below medical disorders including hypertension, hyperlipidemia, diabetes mellitus and GE reflux as well as rotator cuff tear 3/18/2022 status post steroid injection therapy.  She also underwent EGD 6/8/2022 with normal findings, antral gastritis noted and several small diminutive gastric polyps.    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  ER 99%, AZ 99%.    The patient was seen by Dr. Juarez of general surgery in then proceeded with [employer] 9 panel genetic testing, subsequent MRI 7/2/2022 demonstrating her biopsy-proven site of DCIS up to 1.3 cm, nonspecific enhancement surrounding the cavity and no other findings that might suggest further malignancy including the right breast.    The patient 8/18/2022 underwent a left breast localized lumpectomy with pathology revealing left breast now without additional DCIS or LCIS or ALH   determined- PTisNxMx.    The patient was seen 9/12/2022 for radiation therapy consultation with a [place] vacation planned in approximately 3 weeks and the patient to return just after her trip in mid October 2022 with plans to proceed with [address] in 15 fractions.    She is now seen in office 9/14/2022 for consideration of adjuvant therapy.    Additional historical point includes uterine abnormalities noted 7/16/2021 on ultrasound pelvis and transvaginal ultrasound leading to endometrial biopsy that was negative.    Patient is next seen  10/28/2022 with bone density having been performed 10/17 demonstrating lumbar T score of 2.9, left hip T score of 1.0 and right hip T score of 0.5-normal bone density.    She is feeling well though has reviewed the possibility of radiation therapy with she and her  decided not to proceed with it.  As result we discussed moving to endocrine therapy choosing between that which would not confuse the findings of her endometrial thickening.  As result of her normal bone density this will be an aromatase inhibitor such as Arimidex.  At length she and her  are willing to proceed with a trial of this at least over the next month.  This therapy, however, will be offered over 5-year length.    Past Medical History:   Diagnosis Date   • Arthritis    • Asthma    • Breast cancer (HCC)    • Breast cancer, left (HCC)    • Chronic cough    • Diabetes mellitus (HCC)    • Fibrocystic breast    • GERD (gastroesophageal reflux disease)    • Goiter     INWARD GOITER   • Hearing loss     left   • History of colon polyps    • History of skin cancer    • Hyperlipidemia    • Hypertension     OFF MEDS X  4-5 YEARS   • Lung nodule     FOLLOWED BY DR RAMÍREZ - HERNAN WITH REGULAR CT SCANS   • Overactive bladder    • Seasonal allergies    • Vertigo         Past Surgical History:   Procedure Laterality Date   • BREAST CYST EXCISION Left 2002   • BREAST LUMPECTOMY Left 8/18/2022    Procedure: left breast wire localized lumpectomy;  Surgeon: Sarah Juarez MD;  [place] [facility] [place] [facility] [place];  Service: General;  Laterality: Left;   • CATARACT EXTRACTION EXTRACAPSULAR W/ INTRAOCULAR LENS IMPLANTATION     • COLONOSCOPY N/A 03/06/2017    TA polyps, IH   • COLONOSCOPY N/A 02/27/2019    Procedure: COLONOSCOPY TO CECUM WITH COLD BIOPSIES;  Surgeon: Marquez Alan MD;  Location: [facility] ENDOSCOPY;  Service: Gastroenterology   • D & C HYSTEROSCOPY N/A 10/14/2020    Procedure: DILATATION AND CURETTAGE HYSTEROSCOPY WITH MYOSURE;  Surgeon:  Mariano Calix MD;  Location: [facility] OR [facility] [place];  Service: Obstetrics/Gynecology;  Laterality: N/A;   • DENTAL PROCEDURE     • DILATATION AND CURETTAGE      MULTIPLE D/T MISCARRIAGES   • ENDOSCOPY N/A 06/08/2022    Procedure: ESOPHAGOGASTRODUODENOSCOPY with biopsy;  Surgeon: Marquez Alan MD;  Location: [facility] ENDOSCOPY;  Service: Gastroenterology;  Laterality: N/A;  gastritis   • EYE SURGERY  3/4/2015   • ORIF ANKLE FRACTURE Left     WITH INSTRUMENTATION   • SKIN CANCER EXCISION Right     LEG        Current Outpatient Medications on File Prior to Visit   Medication Sig Dispense Refill   • albuterol sulfate  (90 Base) MCG/ACT inhaler Inhale 2 puffs Every 4 (Four) Hours As Needed for Wheezing.     • aspirin 81 MG EC tablet Take 81 mg by mouth Daily.     • budesonide-formoterol (SYMBICORT) 160-4.5 MCG/ACT inhaler Inhale 2 puffs 2 (two) times a day.     • fexofenadine-pseudoephedrine (ALLEGRA-D 24) 180-240 MG per 24 hr tablet Take 1 tablet by mouth As Needed for Allergies.     • fluticasone (FLONASE) 50 MCG/ACT nasal spray 2 sprays into the nostril(s) as directed by provider As Needed for Rhinitis.     • glipiZIDE (GLUCOTROL) 5 MG ER tablet Take 5 mg by mouth Daily.     • levocetirizine (XYZAL) 5 MG tablet Take 5 mg by mouth As Needed.     • metFORMIN (GLUCOPHAGE) 500 MG tablet Take 500 mg by mouth every night at bedtime.     • nortriptyline (PAMELOR) 10 MG capsule Take 1 capsule by mouth At Night As Needed (Diarrhea). 90 capsule 3   • simvastatin (ZOCOR) 10 MG tablet Take 10 mg by mouth Every Night.     • montelukast (SINGULAIR) 10 MG tablet Take 10 mg by mouth Every Night.       No current facility-administered medications on file prior to visit.        ALLERGIES:    Allergies   Allergen Reactions   • Sulfa Antibiotics Other (See Comments)     CHILDHOOD REACTION         Social History     Socioeconomic History   • Marital status:      Spouse name: Kory   Tobacco Use   • Smoking status: Never   •  "Smokeless tobacco: Never   Vaping Use   • Vaping Use: Never used   Substance and Sexual Activity   • Alcohol use: Yes     Comment: RARELY   • Drug use: Never   • Sexual activity: Yes     Partners: Male     Birth control/protection: Post-menopausal, None        Family History   Problem Relation Age of Onset   • GI problems Mother         ileostomy   • Dementia Mother    • Pancreatic cancer Father    • Arthritis Father    • Diabetes Father    • Malig Hyperthermia Neg Hx    • Breast cancer Neg Hx    • Ovarian cancer Neg Hx         Review of Systems   Constitutional: Negative.    HENT: Negative.    Eyes: Negative.    Respiratory: Negative.    Cardiovascular: Negative.    Gastrointestinal: Negative.    Endocrine: Negative.    Genitourinary: Negative.    Musculoskeletal: Negative.    Skin: Negative.    Allergic/Immunologic: Negative.    Neurological: Negative.    Hematological: Negative.    Psychiatric/Behavioral: Negative.         Objective     Vitals:    10/28/22 1430   BP: 142/82   Pulse: 80   Resp: 18   Temp: 98 °F (36.7 °C)   TempSrc: Temporal   SpO2: 98%   Weight: 75.5 kg (166 lb 6.4 oz)   Height: 160 cm (62.99\")   PainSc: 0-No pain     Current Status 10/28/2022   ECOG score 0       Physical Exam  Constitutional:       Appearance: Normal appearance.   HENT:      Head: Normocephalic and atraumatic.      Nose: Nose normal.      Mouth/Throat:      Mouth: Mucous membranes are moist.      Pharynx: Oropharynx is clear.   Eyes:      Extraocular Movements: Extraocular movements intact.      Conjunctiva/sclera: Conjunctivae normal.      Pupils: Pupils are equal, round, and reactive to light.   Cardiovascular:      Rate and Rhythm: Normal rate and regular rhythm.      Pulses: Normal pulses.      Heart sounds: Normal heart sounds.   Pulmonary:      Effort: Pulmonary effort is normal.      Breath sounds: Normal breath sounds.      Comments: Status post left breast lumpectomy, well-healed, mild erythema, no additional axillary " adenopathy.  Abdominal:      General: Bowel sounds are normal.      Palpations: Abdomen is soft.   Musculoskeletal:         General: Normal range of motion.      Cervical back: Normal range of motion and neck supple.   Skin:     General: Skin is warm and dry.   Neurological:      General: No focal deficit present.      Mental Status: She is oriented to person, place, and time.   Psychiatric:         Mood and Affect: Mood normal.         Behavior: Behavior normal.           RECENT LABS:  Hematology WBC   Date Value Ref Range Status   10/28/2022 8.50 3.40 - 10.80 10*3/mm3 Final     RBC   Date Value Ref Range Status   10/28/2022 4.60 3.77 - 5.28 10*6/mm3 Final     Hemoglobin   Date Value Ref Range Status   10/28/2022 12.7 12.0 - 15.9 g/dL Final     Hematocrit   Date Value Ref Range Status   10/28/2022 39.6 34.0 - 46.6 % Final     Platelets   Date Value Ref Range Status   10/28/2022 236 140 - 450 10*3/mm3 Final          Assessment & Plan        77-year-old female with history of hypertension, hyperlipidemia, diabetes, GE reflux, rotator cuff injury as well as additional history of recent EGD with benign findings and the previous endometrial hyperplasia (please see record).      The patient underwent screening mammography/12/22 with microcalcifications seen in the anterior one third of the retroareolar region left breast and stereotactic biopsy 6/13 showed DCIS of high nuclear grade strongly ER/IN positive.  She was seen by general surgery-Dr. Juarez-with MRI demonstrating the biopsy-proven site of DCIS and no other findings that might suggest malignancy in the left breast or right breast.  She underwent lumpectomy 8/18/2022 without residual tumor determined-stage - PTisNxMx.       The patient been seen by radiation therapy and is considering as to whether she will undergo this therapy.        We are asked to see her for adjuvant therapy concerns and have discussed endocrine therapy (tamoxifen versus AI therapy) that  could reduce considerably her risk of invasive or noninvasive breast cancer.       After additional discussion particularly centering on her previous history including her endometrial hyperplasia it would seem prudent to have her undergo a bone density to determine her bone density status before we make a decision about adjuvant endocrine therapy of which she is considering as a total treatment rather than additional radiation therapy at this point.    Patient is next seen 10/28/2022 with bone density having been performed 10/17 demonstrating lumbar T score of 2.9, left hip T score of 1.0 and right hip T score of 0.5-normal bone density.    The patient and her  are advised of the normal bone density findings and have also decided, after reviewing it between themselves, not to proceed with radiation therapy.  This leads to conversation about the use of aromatase number therapy as primary treatment since tamoxifen may actually produce further endometrial thickening and continues her subsequent assessments of this issue.        Plan:  *Initiate a trial of Arimidex 1 mg p.o. daily    *Approximately 5 weeks return BA RUBY    *The patient and  are agreeable this plan and follow-up

## 2022-10-31 ENCOUNTER — OFFICE VISIT (OUTPATIENT)
Dept: RADIATION ONCOLOGY | Facility: HOSPITAL | Age: 77
End: 2022-10-31

## 2022-10-31 ENCOUNTER — APPOINTMENT (OUTPATIENT)
Dept: RADIATION ONCOLOGY | Facility: HOSPITAL | Age: 77
End: 2022-10-31

## 2022-10-31 VITALS
BODY MASS INDEX: 29.77 KG/M2 | HEART RATE: 98 BPM | WEIGHT: 168 LBS | SYSTOLIC BLOOD PRESSURE: 160 MMHG | TEMPERATURE: 98.8 F | DIASTOLIC BLOOD PRESSURE: 80 MMHG | OXYGEN SATURATION: 98 %

## 2022-10-31 DIAGNOSIS — D05.12 DUCTAL CARCINOMA IN SITU (DCIS) OF LEFT BREAST: Primary | ICD-10-CM

## 2022-10-31 PROCEDURE — 99213 OFFICE O/P EST LOW 20 MIN: CPT | Performed by: RADIOLOGY

## 2022-10-31 PROCEDURE — G0463 HOSPITAL OUTPT CLINIC VISIT: HCPCS

## 2022-10-31 NOTE — PROGRESS NOTES
Subjective     Yessi Harley MD    Cancer Staging     Subjective     Yessi Harley MD     Cancer Staging   No matching staging information was found for the patient.   No chief complaint on file.     CC: DCIS left breast, high grade ER GA pos                                Dear Sarah Juarez MD     I had the pleasure of seeing Olinda Baumann  [facility].   The patient is a 77 y.o. 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  She had a screening mammogram on 22 which showed a focal asymmetry right breast.  She had a bilateral diagnostic mammogram on 22 which showed no suspicious findings in right breast however, a new 1.8cm cluster of linear microcalcifications in retroareolar left breast at 6o'clock, birads 4.  She had a stereotactic biopsy on 22 with pathology revealing DCIS high grade with comedo solid and cribriform architecture with expansive necrosis measuring up to 3mm, ER 99% GA 99%.       She had a breast mri on 22 which showed the biopsy-proven site of DCIS in the left breast in the retroareolar region at the 6 o'clock position with the Tri-Bell shaped metallic clip located within a postbiopsy cavity that measures up to 1.3 cm in greatest dimension. Only nonspecific enhancement surrounding the cavity is visualized. No evidence for left axillary adenopathy is appreciated and no other suspicious findings are seen within the left breast.      She underwent a left breast lumpectomy on 22 with pathology revealing no residual dcis with rare microcalcifications.     She is  with menarche age 13 and menopause age 51.  She used oral contraceptives for 5 years and has never used hormone replacement.     She has an appointment with Dr. Rasmussen with medical oncology on 9/15/22.  She is referred today for evaluation for adjuvant radiation.    Interval hx 10/31/22:  She returns today for re-evaluation.  She states she has decided not to  proceed with radiation.          Review of Systems   Constitutional: Negative.    Musculoskeletal: Positive for arthralgias.   Skin: Negative.    Psychiatric/Behavioral: Negative.          Past Medical History:   Diagnosis Date   • Arthritis    • Asthma    • Breast cancer (HCC)    • Breast cancer, left (HCC)    • Chronic cough    • Diabetes mellitus (HCC)    • Fibrocystic breast    • GERD (gastroesophageal reflux disease)    • Goiter     INWARD GOITER   • Hearing loss     left   • History of colon polyps    • History of skin cancer    • Hyperlipidemia    • Hypertension     OFF MEDS X  4-5 YEARS   • Lung nodule     FOLLOWED BY DR DARRELL BECERRA WITH REGULAR CT SCANS   • Overactive bladder    • Seasonal allergies    • Vertigo          Past Surgical History:   Procedure Laterality Date   • BREAST CYST EXCISION Left 2002   • BREAST LUMPECTOMY Left 8/18/2022    Procedure: left breast wire localized lumpectomy;  Surgeon: Sarah Juarez MD;  [place] [facility] [place] [facility] [place]  Service: General;  Laterality: Left;   • CATARACT EXTRACTION EXTRACAPSULAR W/ INTRAOCULAR LENS IMPLANTATION     • COLONOSCOPY N/A 03/06/2017    TA polyps, IH   • COLONOSCOPY N/A 02/27/2019    Procedure: COLONOSCOPY TO CECUM WITH COLD BIOPSIES;  Surgeon: Marquez Alan MD;  Location: [facility] ENDOSCOPY;  Service: Gastroenterology   • D & C HYSTEROSCOPY N/A 10/14/2020    Procedure: DILATATION AND CURETTAGE HYSTEROSCOPY WITH MYOSURE;  Surgeon: Mariano Calix MD;  Location: [facility] [place] [facility] [place];  Service: Obstetrics/Gynecology;  Laterality: N/A;   • DENTAL PROCEDURE     • DILATATION AND CURETTAGE      MULTIPLE D/T MISCARRIAGES   • ENDOSCOPY N/A 06/08/2022    Procedure: ESOPHAGOGASTRODUODENOSCOPY with biopsy;  Surgeon: Marquez Alan MD;  Location: [facility] ENDOSCOPY;  Service: Gastroenterology;  Laterality: N/A;  gastritis   • EYE SURGERY  3/4/2015   • ORIF ANKLE FRACTURE Left     WITH INSTRUMENTATION   • SKIN CANCER EXCISION Right     LEG         Social  History     Socioeconomic History   • Marital status:      Spouse name: Kory   Tobacco Use   • Smoking status: Never   • Smokeless tobacco: Never   Vaping Use   • Vaping Use: Never used   Substance and Sexual Activity   • Alcohol use: Yes     Comment: RARELY   • Drug use: Never   • Sexual activity: Yes     Partners: Male     Birth control/protection: Post-menopausal, None         Family History   Problem Relation Age of Onset   • GI problems Mother         ileostomy   • Dementia Mother    • Pancreatic cancer Father    • Arthritis Father    • Diabetes Father    • Malig Hyperthermia Neg Hx    • Breast cancer Neg Hx    • Ovarian cancer Neg Hx           Objective    Physical Exam  Constitutional:       Appearance: Normal appearance.   Chest:          Comments: Incision well healed,   Neurological:      Mental Status: She is alert.           Current Outpatient Medications on File Prior to Visit   Medication Sig Dispense Refill   • albuterol sulfate  (90 Base) MCG/ACT inhaler Inhale 2 puffs Every 4 (Four) Hours As Needed for Wheezing.     • anastrozole (Arimidex) 1 MG tablet Take 1 tablet by mouth Daily for 30 days. 30 tablet 1   • aspirin 81 MG EC tablet Take 81 mg by mouth Daily.     • budesonide-formoterol (SYMBICORT) 160-4.5 MCG/ACT inhaler Inhale 2 puffs 2 (two) times a day.     • fexofenadine-pseudoephedrine (ALLEGRA-D 24) 180-240 MG per 24 hr tablet Take 1 tablet by mouth As Needed for Allergies.     • fluticasone (FLONASE) 50 MCG/ACT nasal spray 2 sprays into the nostril(s) as directed by provider As Needed for Rhinitis.     • glipiZIDE (GLUCOTROL) 5 MG ER tablet Take 5 mg by mouth Daily.     • levocetirizine (XYZAL) 5 MG tablet Take 5 mg by mouth As Needed.     • metFORMIN (GLUCOPHAGE) 500 MG tablet Take 500 mg by mouth every night at bedtime.     • montelukast (SINGULAIR) 10 MG tablet Take 10 mg by mouth Every Night.     • nortriptyline (PAMELOR) 10 MG capsule Take 1 capsule by mouth At Night As  Needed (Diarrhea). 90 capsule 3   • simvastatin (ZOCOR) 10 MG tablet Take 10 mg by mouth Every Night.       No current facility-administered medications on file prior to visit.       ALLERGIES:    Allergies   Allergen Reactions   • Sulfa Antibiotics Other (See Comments)     CHILDHOOD REACTION        /80   Pulse 98   Temp 98.8 °F (37.1 °C)   Wt 76.2 kg (168 lb)   SpO2 98%   BMI 29.77 kg/m²      Current Status 10/28/2022   ECOG score 0         Assessment & Plan     77 year old female with high grade dcis ER ME pos, 3mm on biopsy with expansive necrosis and no residual disease on lumpectomy specimen.  She has decided not to proceed with radiation.  She is planning on starting hormonal blockade soon. I have not given her a follow up with me but I asked her to call with any questions or concerns in the future.      I personally spent greater than 20 minutes today assessing, managing, discussing and documenting my visit with the patient. That time includes review of records, imaging and pathology reports, obtaining my own history, performing a medically appropriate evaluation, counseling and educating the patient, discussing goals, logistics, alternatives and risks of my recommendations, surveillance options and potential outcomes. It also includes the time documenting the clinical information in the EMR and communicating my recommendations to the other involved physicians.                     Thank you very much for allowing me to participate in the care of this very pleasant patient.    Sincerely,      Yessi Harley MD

## 2022-11-16 ENCOUNTER — PATIENT OUTREACH (OUTPATIENT)
Dept: OTHER | Facility: HOSPITAL | Age: 77
End: 2022-11-16

## 2022-11-16 NOTE — PROGRESS NOTES
Called Ms. Castano to see how she was doing. She stated she is doing well and will follow up with Dr. Rasmussen in Dec. Her phone cut out and was not able to finish our conversation. Called back and left a message discussing our survivorship appointments and stated I would mail her more information about it. Left my contact info and encouraged her to call back at her convenience.

## 2022-11-21 ENCOUNTER — TELEPHONE (OUTPATIENT)
Dept: ONCOLOGY | Facility: CLINIC | Age: 77
End: 2022-11-21

## 2022-11-21 RX ORDER — ANASTROZOLE 1 MG/1
1 TABLET ORAL DAILY
Qty: 30 TABLET | Refills: 5 | Status: SHIPPED | OUTPATIENT
Start: 2022-11-21 | End: 2022-12-08 | Stop reason: SDUPTHER

## 2022-11-21 RX ORDER — ANASTROZOLE 1 MG/1
1 TABLET ORAL DAILY
Qty: 30 TABLET | Refills: 1 | Status: CANCELLED | OUTPATIENT
Start: 2022-11-21 | End: 2022-12-21

## 2022-11-21 NOTE — TELEPHONE ENCOUNTER
Caller: Kory Baumann    Relationship to patient: Emergency Contact    Best call back number: [phone]    Chief complaint: PATIENT TO RESCHEDULE 12/2/22 APPT    Type of visit:LAB AND FU    Requested date: AFTER 12/4/22    Additional notes:PATIENT ANASTROZOLE RUNS OUT 11/30/22. PATIENT WILL BE LEAVING TOWN PRIOR TO THAT WEEK. REQUESTING REFILL BE SENT TO [facility] FOR

## 2022-11-22 ENCOUNTER — OFFICE VISIT (OUTPATIENT)
Dept: OBSTETRICS AND GYNECOLOGY | Age: 77
End: 2022-11-22

## 2022-11-22 VITALS
HEIGHT: 63 IN | SYSTOLIC BLOOD PRESSURE: 134 MMHG | DIASTOLIC BLOOD PRESSURE: 84 MMHG | WEIGHT: 168 LBS | BODY MASS INDEX: 29.77 KG/M2

## 2022-11-22 DIAGNOSIS — Z01.419 WELL WOMAN EXAM WITH ROUTINE GYNECOLOGICAL EXAM: ICD-10-CM

## 2022-11-22 DIAGNOSIS — Z12.4 CERVICAL CANCER SCREENING: Primary | ICD-10-CM

## 2022-11-22 PROCEDURE — G0101 CA SCREEN;PELVIC/BREAST EXAM: HCPCS | Performed by: OBSTETRICS & GYNECOLOGY

## 2022-11-22 NOTE — PROGRESS NOTES
Subjective   Chief Complaint   Patient presents with   • Gynecologic Exam     Annual exam last pap 2020 neg/neg, mg/ 2022, c/scope , dexa 10/2022, was dx with breast cancer in august, was contained to milk glands and was removed with surgery.      History of Present Illness  Wellness exam  Olinda Baumann is a very pleasant  77 y.o. female .  , Mammo Exam 522, , Exercise some  Patient has no gynecological concerns or complaints.  She previously had a hysteroscopy MyoSure polypectomy in 2020 which showed a polyp.  She has had no bleeding or discharge since that time    She was discharged with DCIS and is treated with an aromatase inhibitor Arimidex  .    Obstetric History:  OB History             Para        Term   0            AB        Living           SAB        IAB        Ectopic        Molar        Multiple        Live Births                   Menstrual History:     No LMP recorded. Patient is postmenopausal.       Sexual History:       Past Medical History:   Diagnosis Date   • Arthritis    • Asthma    • Breast cancer (HCC)    • Breast cancer, left (HCC)    • Chronic cough    • Diabetes mellitus (HCC)    • Fibrocystic breast    • GERD (gastroesophageal reflux disease)    • Goiter     INWARD GOITER   • Hearing loss     left   • History of colon polyps    • History of skin cancer    • Hyperlipidemia    • Hypertension     OFF MEDS X  4-5 YEARS   • Lung nodule     FOLLOWED BY DR RAMÍREZ - WATCHING WITH REGULAR CT SCANS   • Overactive bladder    • Seasonal allergies    • Vertigo      Past Surgical History:   Procedure Laterality Date   • BREAST CYST EXCISION Left    • BREAST LUMPECTOMY Left 2022    Procedure: left breast wire localized lumpectomy;  Surgeon: Sarah Juarez MD;  [place]: [facility] [place] [facility] [place];  Service: General;  Laterality: Left;   • CATARACT EXTRACTION EXTRACAPSULAR W/ INTRAOCULAR LENS IMPLANTATION     • COLONOSCOPY N/A 2017    TA polyps, IH   • COLONOSCOPY  N/A 02/27/2019    Procedure: COLONOSCOPY TO CECUM WITH COLD BIOPSIES;  Surgeon: Marquez Alan MD;  Location: [facility] ENDOSCOPY;  Service: Gastroenterology   • D & C HYSTEROSCOPY N/A 10/14/2020    Procedure: DILATATION AND CURETTAGE HYSTEROSCOPY WITH MYOSURE;  Surgeon: Mariano Calix MD;  Location:  [facility];  Service: Obstetrics/Gynecology;  Laterality: N/A;   • DENTAL PROCEDURE     • DILATATION AND CURETTAGE      MULTIPLE D/T MISCARRIAGES   • ENDOSCOPY N/A 06/08/2022    Procedure: ESOPHAGOGASTRODUODENOSCOPY with biopsy;  Surgeon: Marquez Alan MD;  Location: [facility] ENDOSCOPY;  Service: Gastroenterology;  Laterality: N/A;  gastritis   • EYE SURGERY  3/4/2015   • ORIF ANKLE FRACTURE Left     WITH INSTRUMENTATION   • SKIN CANCER EXCISION Right     LEG       Current Outpatient Medications:   •  albuterol sulfate  (90 Base) MCG/ACT inhaler, Inhale 2 puffs Every 4 (Four) Hours As Needed for Wheezing., Disp: , Rfl:   •  anastrozole (Arimidex) 1 MG tablet, Take 1 tablet by mouth Daily for 30 days., Disp: 30 tablet, Rfl: 5  •  aspirin 81 MG EC tablet, Take 81 mg by mouth Daily., Disp: , Rfl:   •  budesonide-formoterol (SYMBICORT) 160-4.5 MCG/ACT inhaler, Inhale 2 puffs 2 (two) times a day., Disp: , Rfl:   •  fexofenadine-pseudoephedrine (ALLEGRA-D 24) 180-240 MG per 24 hr tablet, Take 1 tablet by mouth As Needed for Allergies., Disp: , Rfl:   •  fluticasone (FLONASE) 50 MCG/ACT nasal spray, 2 sprays into the nostril(s) as directed by provider As Needed for Rhinitis., Disp: , Rfl:   •  glipiZIDE (GLUCOTROL) 5 MG ER tablet, Take 5 mg by mouth Daily., Disp: , Rfl:   •  metFORMIN (GLUCOPHAGE) 500 MG tablet, Take 500 mg by mouth every night at bedtime., Disp: , Rfl:   •  nortriptyline (PAMELOR) 10 MG capsule, Take 1 capsule by mouth At Night As Needed (Diarrhea)., Disp: 90 capsule, Rfl: 3  •  simvastatin (ZOCOR) 10 MG tablet, Take 10 mg by mouth Every Night., Disp: , Rfl:   •  montelukast (SINGULAIR) 10  "MG tablet, Take 10 mg by mouth Every Night., Disp: , Rfl:    SOCIAL Hx:  [unfilled]    The following portions of the patient's history were reviewed and updated as appropriate: allergies, current medications, past family history, past medical history, past social history, past surgical history and problem list.    Review of Systems      Urinary incontinence assessment discussed      Except as outlined in history of physical illness, patient denies any changes in her GYN, , GI systems.  All other systems reviewed are negative         Objective   Physical Exam    /84   Ht 160 cm (63\")   Wt 76.2 kg (168 lb)   BMI 29.76 kg/m²     General: Patient is alert and oriented and appears overall healthy  Neck: Is supple without thyromegaly, no carotid bruits and no lymphadenopathy  Lungs: Clear bilaterally, no wheezing, rhonchi, or rales.  Respiratory rate is normal  Breast: Even symmetrical, no lymphadenopathy, no retraction, no discharge ,no masses , lumps appreciated on either side  Heart: Regular rate and rhythm are appreciated, no murmurs or rubs are heard  Abdomen: Is soft, without organomegaly, bowel sounds are positive, there is no rebound or guarding and palpation does not produce any discomfort  Back: Nontender without CVA tenderness  Pelvic: External genitalia appear normal and consistent with mature female.  BUS normal                Urethra appears normal and without mass, bladder is nontender and without any lesions                        Urethral meatus is normal without scarring tenderness or masses                 Bladder is without tenderness or fullness                           Vagina is clean dry without discharge and , no lesions or masses are present                         Cervix is noninflamed without discharge or lesions.  There is no cervical motion tenderness.                Uterus is nonenlarged, without tenderness, and no masses or abnormalities are  present               Adnexa are " non-enlarged, non tender               Rectal digital  exam reveals adequate sphincter tone and no masses or lesions are appreciated on digital rectal examination.       Patient Active Problem List   Diagnosis   • Encounter for screening for malignant neoplasm of colon   • Chronic diarrhea   • Irritable bowel syndrome with diarrhea   • Chronic cough   • Endometrial polyp   • Ductal carcinoma in situ (DCIS) of breast                Assessment & Plan   Diagnoses and all orders for this visit:    1. Cervical cancer screening (Primary)  -     IGP, Apt HPV,rfx 16 / 18,45    2. Well woman exam with routine gynecological exam  -     IGP, Apt HPV,rfx 16 / 18,45    I have asked patient to call me if she were to ever have any type of vaginal discharge or bleeding given her past history and now with therapy.  Patient agrees to do so otherwise she is can to continue to follow-up with her hematologist.  We will see her back next year.  Or sooner if she has any problems  Discussed today's findings and concerns with patient.  Continue to recommend regular exercise including cardiovascular and resistance training as well as  breast self-exam. Wellness lab, mammography, & pap smear, in accordance with age guidelines.    I have encouraged her to call for today's test results if she has not received them within 10 days.  Patient is advised to call with any change in her condition or with any other questions, otherwise return  for annual examination.

## 2022-12-02 ENCOUNTER — APPOINTMENT (OUTPATIENT)
Dept: LAB | Facility: HOSPITAL | Age: 77
End: 2022-12-02
Payer: MEDICARE

## 2022-12-05 DIAGNOSIS — D05.12 DUCTAL CARCINOMA IN SITU (DCIS) OF LEFT BREAST: Primary | ICD-10-CM

## 2022-12-08 ENCOUNTER — LAB (OUTPATIENT)
Dept: LAB | Facility: HOSPITAL | Age: 77
End: 2022-12-08
Payer: MEDICARE

## 2022-12-08 ENCOUNTER — OFFICE VISIT (OUTPATIENT)
Dept: ONCOLOGY | Facility: CLINIC | Age: 77
End: 2022-12-08

## 2022-12-08 VITALS
DIASTOLIC BLOOD PRESSURE: 84 MMHG | SYSTOLIC BLOOD PRESSURE: 145 MMHG | OXYGEN SATURATION: 98 % | HEART RATE: 100 BPM | BODY MASS INDEX: 29.64 KG/M2 | RESPIRATION RATE: 18 BRPM | WEIGHT: 167.3 LBS | HEIGHT: 63 IN | TEMPERATURE: 96.9 F

## 2022-12-08 DIAGNOSIS — M65.351 TRIGGER FINGER OF ALL DIGITS OF RIGHT HAND: Primary | ICD-10-CM

## 2022-12-08 DIAGNOSIS — D05.12 DUCTAL CARCINOMA IN SITU (DCIS) OF LEFT BREAST: ICD-10-CM

## 2022-12-08 DIAGNOSIS — M65.331 TRIGGER FINGER OF ALL DIGITS OF RIGHT HAND: Primary | ICD-10-CM

## 2022-12-08 DIAGNOSIS — M65.341 TRIGGER FINGER OF ALL DIGITS OF RIGHT HAND: Primary | ICD-10-CM

## 2022-12-08 DIAGNOSIS — M65.311 TRIGGER FINGER OF ALL DIGITS OF RIGHT HAND: Primary | ICD-10-CM

## 2022-12-08 DIAGNOSIS — M65.321 TRIGGER FINGER OF ALL DIGITS OF RIGHT HAND: Primary | ICD-10-CM

## 2022-12-08 LAB
BASOPHILS # BLD AUTO: 0.03 10*3/MM3 (ref 0–0.2)
BASOPHILS NFR BLD AUTO: 0.5 % (ref 0–1.5)
DEPRECATED RDW RBC AUTO: 40.4 FL (ref 37–54)
EOSINOPHIL # BLD AUTO: 0.16 10*3/MM3 (ref 0–0.4)
EOSINOPHIL NFR BLD AUTO: 2.7 % (ref 0.3–6.2)
ERYTHROCYTE [DISTWIDTH] IN BLOOD BY AUTOMATED COUNT: 13.4 % (ref 12.3–15.4)
HCT VFR BLD AUTO: 37.7 % (ref 34–46.6)
HGB BLD-MCNC: 13 G/DL (ref 12–15.9)
IMM GRANULOCYTES # BLD AUTO: 0.03 10*3/MM3 (ref 0–0.05)
IMM GRANULOCYTES NFR BLD AUTO: 0.5 % (ref 0–0.5)
LYMPHOCYTES # BLD AUTO: 1.87 10*3/MM3 (ref 0.7–3.1)
LYMPHOCYTES NFR BLD AUTO: 31 % (ref 19.6–45.3)
MCH RBC QN AUTO: 28.4 PG (ref 26.6–33)
MCHC RBC AUTO-ENTMCNC: 34.5 G/DL (ref 31.5–35.7)
MCV RBC AUTO: 82.5 FL (ref 79–97)
MONOCYTES # BLD AUTO: 0.63 10*3/MM3 (ref 0.1–0.9)
MONOCYTES NFR BLD AUTO: 10.4 % (ref 5–12)
NEUTROPHILS NFR BLD AUTO: 3.31 10*3/MM3 (ref 1.7–7)
NEUTROPHILS NFR BLD AUTO: 54.9 % (ref 42.7–76)
NRBC BLD AUTO-RTO: 0 /100 WBC (ref 0–0.2)
PLATELET # BLD AUTO: 201 10*3/MM3 (ref 140–450)
PMV BLD AUTO: 8.5 FL (ref 6–12)
RBC # BLD AUTO: 4.57 10*6/MM3 (ref 3.77–5.28)
WBC NRBC COR # BLD: 6.03 10*3/MM3 (ref 3.4–10.8)

## 2022-12-08 PROCEDURE — 36415 COLL VENOUS BLD VENIPUNCTURE: CPT

## 2022-12-08 PROCEDURE — 85025 COMPLETE CBC W/AUTO DIFF WBC: CPT

## 2022-12-08 PROCEDURE — 99213 OFFICE O/P EST LOW 20 MIN: CPT | Performed by: INTERNAL MEDICINE

## 2022-12-08 RX ORDER — ANASTROZOLE 1 MG/1
1 TABLET ORAL DAILY
Qty: 90 TABLET | Refills: 3 | Status: SHIPPED | OUTPATIENT
Start: 2022-12-08 | End: 2023-01-07

## 2022-12-08 NOTE — PROGRESS NOTES
REASON FOR FOLLOW-UP: DCIS of left breast.      History of Present Illness   The patient 77-year-old female followed by primary care with the below medical disorders including hypertension, hyperlipidemia, diabetes mellitus and GE reflux as well as rotator cuff tear 3/18/2022 status post steroid injection therapy.  She also underwent EGD 6/8/2022 with normal findings, antral gastritis noted and several small diminutive gastric polyps.    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  ER 99%, FL 99%.    The patient was seen by Dr. Juarez of general surgery in then proceeded with [employer] 9 panel genetic testing, subsequent MRI 7/2/2022 demonstrating her biopsy-proven site of DCIS up to 1.3 cm, nonspecific enhancement surrounding the cavity and no other findings that might suggest further malignancy including the right breast.    The patient 8/18/2022 underwent a left breast localized lumpectomy with pathology revealing left breast now without additional DCIS or LCIS or ALH   determined- PTisNxMx.    The patient was seen 9/12/2022 for radiation therapy consultation with a [place] vacation planned in approximately 3 weeks and the patient to return just after her trip in mid October 2022 with plans to proceed with [address] in 15 fractions.    She is now seen in office 9/14/2022 for consideration of adjuvant therapy.    Additional historical point includes uterine abnormalities noted 7/16/2021 on ultrasound pelvis and transvaginal ultrasound leading to endometrial biopsy that was negative.    Patient is next seen  10/28/2022 with bone density having been performed 10/17 demonstrating lumbar T score of 2.9, left hip T score of 1.0 and right hip T score of 0.5-normal bone density.    She is feeling well though has reviewed the possibility of radiation therapy with she and her  decided not to proceed with it.  As result we discussed moving to endocrine therapy choosing between that which would not confuse the findings of her endometrial thickening.  As result of her normal bone density this will be an aromatase inhibitor such as Arimidex.  At length she and her  are willing to proceed with a trial of this at least over the next month.  This therapy, however, will be offered over 5-year length.    The patient seen back in follow-up 12/8/2022.  She is tolerating Arimidex without complication though is having further issues with her right hand and trigger finger that has been present for many years.    Past Medical History:   Diagnosis Date   • Arthritis    • Asthma    • Breast cancer (HCC)    • Breast cancer, left (HCC)    • Chronic cough    • Diabetes mellitus (HCC)    • Fibrocystic breast    • GERD (gastroesophageal reflux disease)    • Goiter     INWARD GOITER   • Hearing loss     left   • History of colon polyps    • History of skin cancer    • Hyperlipidemia    • Hypertension     OFF MEDS X  4-5 YEARS   • Lung nodule     FOLLOWED BY DR RAMÍREZ - WATCHING WITH REGULAR CT SCANS   • Overactive bladder    • Seasonal allergies    • Vertigo         Past Surgical History:   Procedure Laterality Date   • BREAST CYST EXCISION Left 2002   • BREAST LUMPECTOMY Left 8/18/2022    Procedure: left breast wire localized lumpectomy;  Surgeon: Sarah Juarez MD;  Location: [facility] OR [facility] [place];  Service: General;  Laterality: Left;   • CATARACT EXTRACTION EXTRACAPSULAR W/ INTRAOCULAR LENS IMPLANTATION     • COLONOSCOPY N/A 03/06/2017    TA polyps, IH   • COLONOSCOPY N/A 02/27/2019    Procedure: COLONOSCOPY TO CECUM WITH COLD BIOPSIES;   Surgeon: Marquez Alan MD;  Location: [facility] ENDOSCOPY;  Service: Gastroenterology   • D & C HYSTEROSCOPY N/A 10/14/2020    Procedure: DILATATION AND CURETTAGE HYSTEROSCOPY WITH MYOSURE;  Surgeon: Mariano Calix MD;  Location: [facility] OR [facility] [place];  Service: Obstetrics/Gynecology;  Laterality: N/A;   • DENTAL PROCEDURE     • DILATATION AND CURETTAGE      MULTIPLE D/T MISCARRIAGES   • ENDOSCOPY N/A 06/08/2022    Procedure: ESOPHAGOGASTRODUODENOSCOPY with biopsy;  Surgeon: Marquez Alan MD;  Location: [facility] ENDOSCOPY;  Service: Gastroenterology;  Laterality: N/A;  gastritis   • EYE SURGERY  3/4/2015   • ORIF ANKLE FRACTURE Left     WITH INSTRUMENTATION   • SKIN CANCER EXCISION Right     LEG        Current Outpatient Medications on File Prior to Visit   Medication Sig Dispense Refill   • albuterol sulfate  (90 Base) MCG/ACT inhaler Inhale 2 puffs Every 4 (Four) Hours As Needed for Wheezing.     • aspirin 81 MG EC tablet Take 81 mg by mouth Daily.     • budesonide-formoterol (SYMBICORT) 160-4.5 MCG/ACT inhaler Inhale 2 puffs 2 (two) times a day.     • fexofenadine-pseudoephedrine (ALLEGRA-D 24) 180-240 MG per 24 hr tablet Take 1 tablet by mouth As Needed for Allergies.     • fluticasone (FLONASE) 50 MCG/ACT nasal spray 2 sprays into the nostril(s) as directed by provider As Needed for Rhinitis.     • glipiZIDE (GLUCOTROL) 5 MG ER tablet Take 5 mg by mouth Daily.     • metFORMIN (GLUCOPHAGE) 500 MG tablet Take 500 mg by mouth every night at bedtime.     • montelukast (SINGULAIR) 10 MG tablet Take 10 mg by mouth Every Night.     • nortriptyline (PAMELOR) 10 MG capsule Take 1 capsule by mouth At Night As Needed (Diarrhea). 90 capsule 3   • simvastatin (ZOCOR) 10 MG tablet Take 10 mg by mouth Every Night.     • [DISCONTINUED] anastrozole (Arimidex) 1 MG tablet Take 1 tablet by mouth Daily for 30 days. 30 tablet 5     No current facility-administered medications on file prior to visit.        ALLERGIES:   "  Allergies   Allergen Reactions   • Sulfa Antibiotics Other (See Comments)     CHILDHOOD REACTION         Social History     Socioeconomic History   • Marital status:      Spouse name: Kory   Tobacco Use   • Smoking status: Never   • Smokeless tobacco: Never   Vaping Use   • Vaping Use: Never used   Substance and Sexual Activity   • Alcohol use: Yes     Comment: RARELY   • Drug use: Never   • Sexual activity: Yes     Partners: Male     Birth control/protection: Post-menopausal, None        Family History   Problem Relation Age of Onset   • GI problems Mother         ileostomy   • Dementia Mother    • Pancreatic cancer Father    • Arthritis Father    • Diabetes Father    • Malig Hyperthermia Neg Hx    • Breast cancer Neg Hx    • Ovarian cancer Neg Hx         Review of Systems   Constitutional: Negative.    HENT: Negative.    Eyes: Negative.    Respiratory: Negative.    Cardiovascular: Negative.    Gastrointestinal: Negative.    Endocrine: Negative.    Genitourinary: Negative.    Musculoskeletal: Negative.    Skin: Negative.    Allergic/Immunologic: Negative.    Neurological: Negative.    Hematological: Negative.    Psychiatric/Behavioral: Negative.         Objective     Vitals:    12/08/22 0859   BP: 145/84   Pulse: 100   Resp: 18   Temp: 96.9 °F (36.1 °C)   TempSrc: Temporal   SpO2: 98%   Weight: 75.9 kg (167 lb 4.8 oz)   Height: 160 cm (62.99\")   PainSc: 0-No pain     Current Status 12/8/2022   ECOG score 0       Physical Exam  Constitutional:       Appearance: Normal appearance.   HENT:      Head: Normocephalic and atraumatic.      Nose: Nose normal.      Mouth/Throat:      Mouth: Mucous membranes are moist.      Pharynx: Oropharynx is clear.   Eyes:      Extraocular Movements: Extraocular movements intact.      Conjunctiva/sclera: Conjunctivae normal.      Pupils: Pupils are equal, round, and reactive to light.   Cardiovascular:      Rate and Rhythm: Normal rate and regular rhythm.      Pulses: Normal " pulses.      Heart sounds: Normal heart sounds.   Pulmonary:      Effort: Pulmonary effort is normal.      Breath sounds: Normal breath sounds.      Comments: Status post left breast lumpectomy, well-healed, mild erythema, no additional axillary adenopathy.  Abdominal:      General: Bowel sounds are normal.      Palpations: Abdomen is soft.   Musculoskeletal:         General: Deformity (Trigger fingers right hand) present. Normal range of motion.      Cervical back: Normal range of motion and neck supple.   Skin:     General: Skin is warm and dry.   Neurological:      General: No focal deficit present.      Mental Status: She is oriented to person, place, and time.   Psychiatric:         Mood and Affect: Mood normal.         Behavior: Behavior normal.           RECENT LABS:  Hematology WBC   Date Value Ref Range Status   12/08/2022 6.03 3.40 - 10.80 10*3/mm3 Final     RBC   Date Value Ref Range Status   12/08/2022 4.57 3.77 - 5.28 10*6/mm3 Final     Hemoglobin   Date Value Ref Range Status   12/08/2022 13.0 12.0 - 15.9 g/dL Final     Hematocrit   Date Value Ref Range Status   12/08/2022 37.7 34.0 - 46.6 % Final     Platelets   Date Value Ref Range Status   12/08/2022 201 140 - 450 10*3/mm3 Final          Assessment & Plan        77-year-old female with history of hypertension, hyperlipidemia, diabetes, GE reflux, rotator cuff injury as well as additional history of recent EGD with benign findings and the previous endometrial hyperplasia (please see record).      The patient underwent screening mammography/12/22 with microcalcifications seen in the anterior one third of the retroareolar region left breast and stereotactic biopsy 6/13 showed DCIS of high nuclear grade strongly ER/OH positive.  She was seen by general surgery-Dr. Juarez-with MRI demonstrating the biopsy-proven site of DCIS and no other findings that might suggest malignancy in the left breast or right breast.  She underwent lumpectomy 8/18/2022 without  residual tumor determined-stage - PTisNxMx.       The patient been seen by radiation therapy and is considering as to whether she will undergo this therapy.        We are asked to see her for adjuvant therapy concerns and have discussed endocrine therapy (tamoxifen versus AI therapy) that could reduce considerably her risk of invasive or noninvasive breast cancer.       After additional discussion particularly centering on her previous history including her endometrial hyperplasia it would seem prudent to have her undergo a bone density to determine her bone density status before we make a decision about adjuvant endocrine therapy of which she is considering as a total treatment rather than additional radiation therapy at this point.    Patient is next seen 10/28/2022 with bone density having been performed 10/17 demonstrating lumbar T score of 2.9, left hip T score of 1.0 and right hip T score of 0.5-normal bone density.    The patient and her  are advised of the normal bone density findings and have also decided, after reviewing it between themselves, not to proceed with radiation therapy.  This leads to conversation about the use of aromatase number therapy as primary treatment since tamoxifen may actually produce further endometrial thickening and continues her subsequent assessments of this issue.    The patient is next seen 12/8/2022 tolerating Arimidex well without substantial side effects.  We will plan to continue for an additional 3-month trial.        Plan:  *Continue Arimidex, 3-month return    *90-day supply E scribed to pharmacy with 3 refills    *Referral to hand surgery- Neno Sparks who had seen the patient previously.    *The patient and  are agreeable this plan and follow-up

## 2023-01-19 ENCOUNTER — TRANSCRIBE ORDERS (OUTPATIENT)
Dept: ADMINISTRATIVE | Facility: HOSPITAL | Age: 78
End: 2023-01-19
Payer: MEDICARE

## 2023-01-19 DIAGNOSIS — C50.919 MALIGNANT NEOPLASM OF FEMALE BREAST, UNSPECIFIED ESTROGEN RECEPTOR STATUS, UNSPECIFIED LATERALITY, UNSPECIFIED SITE OF BREAST: Primary | ICD-10-CM

## 2023-02-02 ENCOUNTER — TRANSCRIBE ORDERS (OUTPATIENT)
Dept: ADMINISTRATIVE | Facility: HOSPITAL | Age: 78
End: 2023-02-02
Payer: MEDICARE

## 2023-02-02 DIAGNOSIS — Z12.31 VISIT FOR SCREENING MAMMOGRAM: Primary | ICD-10-CM

## 2023-02-07 ENCOUNTER — PATIENT OUTREACH (OUTPATIENT)
Dept: OTHER | Facility: HOSPITAL | Age: 78
End: 2023-02-07
Payer: MEDICARE

## 2023-02-07 NOTE — PROGRESS NOTES
Ms. Baumann called with pilis about upcoming appointments and we discussed those. Also discussed survivorship and she declines at this time. She will reach out if any need arises.

## 2023-03-10 ENCOUNTER — LAB (OUTPATIENT)
Dept: LAB | Facility: HOSPITAL | Age: 78
End: 2023-03-10
Payer: MEDICARE

## 2023-03-10 ENCOUNTER — OFFICE VISIT (OUTPATIENT)
Dept: ONCOLOGY | Facility: CLINIC | Age: 78
End: 2023-03-10
Payer: MEDICARE

## 2023-03-10 VITALS
RESPIRATION RATE: 20 BRPM | HEART RATE: 100 BPM | DIASTOLIC BLOOD PRESSURE: 72 MMHG | OXYGEN SATURATION: 97 % | TEMPERATURE: 97.7 F | HEIGHT: 63 IN | WEIGHT: 167.5 LBS | BODY MASS INDEX: 29.68 KG/M2 | SYSTOLIC BLOOD PRESSURE: 150 MMHG

## 2023-03-10 DIAGNOSIS — D05.12 DUCTAL CARCINOMA IN SITU (DCIS) OF LEFT BREAST: Primary | ICD-10-CM

## 2023-03-10 DIAGNOSIS — D05.12 DUCTAL CARCINOMA IN SITU (DCIS) OF LEFT BREAST: ICD-10-CM

## 2023-03-10 DIAGNOSIS — M65.351 TRIGGER FINGER OF ALL DIGITS OF RIGHT HAND: ICD-10-CM

## 2023-03-10 DIAGNOSIS — M65.341 TRIGGER FINGER OF ALL DIGITS OF RIGHT HAND: ICD-10-CM

## 2023-03-10 DIAGNOSIS — M65.331 TRIGGER FINGER OF ALL DIGITS OF RIGHT HAND: ICD-10-CM

## 2023-03-10 DIAGNOSIS — M65.321 TRIGGER FINGER OF ALL DIGITS OF RIGHT HAND: ICD-10-CM

## 2023-03-10 DIAGNOSIS — M65.311 TRIGGER FINGER OF ALL DIGITS OF RIGHT HAND: ICD-10-CM

## 2023-03-10 LAB
ALBUMIN SERPL-MCNC: 4.2 G/DL (ref 3.5–5.2)
ALBUMIN/GLOB SERPL: 1.5 G/DL (ref 1.1–2.4)
ALP SERPL-CCNC: 92 U/L (ref 38–116)
ALT SERPL W P-5'-P-CCNC: 15 U/L (ref 0–33)
ANION GAP SERPL CALCULATED.3IONS-SCNC: 13.9 MMOL/L (ref 5–15)
AST SERPL-CCNC: 15 U/L (ref 0–32)
BASOPHILS # BLD AUTO: 0.03 10*3/MM3 (ref 0–0.2)
BASOPHILS NFR BLD AUTO: 0.4 % (ref 0–1.5)
BILIRUB SERPL-MCNC: 0.2 MG/DL (ref 0.2–1.2)
BUN SERPL-MCNC: 11 MG/DL (ref 6–20)
BUN/CREAT SERPL: 17.5 (ref 7.3–30)
CALCIUM SPEC-SCNC: 9.5 MG/DL (ref 8.5–10.2)
CHLORIDE SERPL-SCNC: 104 MMOL/L (ref 98–107)
CO2 SERPL-SCNC: 22.1 MMOL/L (ref 22–29)
CREAT SERPL-MCNC: 0.63 MG/DL (ref 0.6–1.1)
DEPRECATED RDW RBC AUTO: 42.3 FL (ref 37–54)
EGFRCR SERPLBLD CKD-EPI 2021: 91.5 ML/MIN/1.73
EOSINOPHIL # BLD AUTO: 0.05 10*3/MM3 (ref 0–0.4)
EOSINOPHIL NFR BLD AUTO: 0.7 % (ref 0.3–6.2)
ERYTHROCYTE [DISTWIDTH] IN BLOOD BY AUTOMATED COUNT: 13.5 % (ref 12.3–15.4)
GLOBULIN UR ELPH-MCNC: 2.8 GM/DL (ref 1.8–3.5)
GLUCOSE SERPL-MCNC: 272 MG/DL (ref 74–124)
HCT VFR BLD AUTO: 40.9 % (ref 34–46.6)
HGB BLD-MCNC: 12.9 G/DL (ref 12–15.9)
IMM GRANULOCYTES # BLD AUTO: 0.02 10*3/MM3 (ref 0–0.05)
IMM GRANULOCYTES NFR BLD AUTO: 0.3 % (ref 0–0.5)
LYMPHOCYTES # BLD AUTO: 2.04 10*3/MM3 (ref 0.7–3.1)
LYMPHOCYTES NFR BLD AUTO: 27 % (ref 19.6–45.3)
MCH RBC QN AUTO: 27.1 PG (ref 26.6–33)
MCHC RBC AUTO-ENTMCNC: 31.5 G/DL (ref 31.5–35.7)
MCV RBC AUTO: 85.9 FL (ref 79–97)
MONOCYTES # BLD AUTO: 0.67 10*3/MM3 (ref 0.1–0.9)
MONOCYTES NFR BLD AUTO: 8.9 % (ref 5–12)
NEUTROPHILS NFR BLD AUTO: 4.75 10*3/MM3 (ref 1.7–7)
NEUTROPHILS NFR BLD AUTO: 62.7 % (ref 42.7–76)
NRBC BLD AUTO-RTO: 0 /100 WBC (ref 0–0.2)
PLATELET # BLD AUTO: 238 10*3/MM3 (ref 140–450)
PMV BLD AUTO: 8.2 FL (ref 6–12)
POTASSIUM SERPL-SCNC: 4.2 MMOL/L (ref 3.5–4.7)
PROT SERPL-MCNC: 7 G/DL (ref 6.3–8)
RBC # BLD AUTO: 4.76 10*6/MM3 (ref 3.77–5.28)
SODIUM SERPL-SCNC: 140 MMOL/L (ref 134–145)
WBC NRBC COR # BLD: 7.56 10*3/MM3 (ref 3.4–10.8)

## 2023-03-10 PROCEDURE — 85025 COMPLETE CBC W/AUTO DIFF WBC: CPT

## 2023-03-10 PROCEDURE — 80053 COMPREHEN METABOLIC PANEL: CPT

## 2023-03-10 PROCEDURE — 99213 OFFICE O/P EST LOW 20 MIN: CPT | Performed by: INTERNAL MEDICINE

## 2023-03-10 PROCEDURE — 36415 COLL VENOUS BLD VENIPUNCTURE: CPT

## 2023-03-10 RX ORDER — ANASTROZOLE 1 MG/1
1 TABLET ORAL DAILY
Qty: 90 TABLET | Refills: 3 | Status: SHIPPED | OUTPATIENT
Start: 2023-03-10

## 2023-03-10 NOTE — PROGRESS NOTES
REASON FOR FOLLOW-UP: DCIS of left breast.      History of Present Illness   The patient 78-year-old female followed by primary care with the below medical disorders including hypertension, hyperlipidemia, diabetes mellitus and GE reflux as well as rotator cuff tear 3/18/2022 status post steroid injection therapy.  She also underwent EGD 6/8/2022 with normal findings, antral gastritis noted and several small diminutive gastric polyps.    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  ER 99%, DC 99%.    The patient was seen by Dr. Juarez of general surgery in then proceeded with [facility] 9 panel genetic testing, subsequent MRI 7/2/2022 demonstrating her biopsy-proven site of DCIS up to 1.3 cm, nonspecific enhancement surrounding the cavity and no other findings that might suggest further malignancy including the right breast.    The patient 8/18/2022 underwent a left breast localized lumpectomy with pathology revealing left breast now without additional DCIS or LCIS or ALH   determined- PTisNxMx.    The patient was seen 9/12/2022 for radiation therapy consultation with a [place] vacation planned in approximately 3 weeks and the patient to return just after her trip in mid October 2022 with plans to proceed with [address] in 15 fractions.    She is now seen in office 9/14/2022 for consideration of adjuvant therapy.    Additional historical point includes uterine abnormalities noted 7/16/2021 on ultrasound pelvis and transvaginal ultrasound leading to endometrial biopsy that was negative.    Patient is next seen  10/28/2022 with bone density having been performed 10/17 demonstrating lumbar T score of 2.9, left hip T score of 1.0 and right hip T score of 0.5-normal bone density.    She is feeling well though has reviewed the possibility of radiation therapy with she and her  decided not to proceed with it.  As result we discussed moving to endocrine therapy choosing between that which would not confuse the findings of her endometrial thickening.  As result of her normal bone density this will be an aromatase inhibitor such as Arimidex.  At length she and her  are willing to proceed with a trial of this at least over the next month.  This therapy, however, will be offered over 5-year length.    The patient seen back in follow-up 12/8/2022.  She is tolerating Arimidex without complication though is having further issues with her right hand and trigger finger that has been present for many years.    The patient was referred to hand surgery and underwent injection 12/30/2022.  This was successful and her hand pain and immobility has improved.  We have, additionally, discussed Dupuytren contracture of which the findings physically are suggestive.  Otherwise she is not having any issues tolerating Arimidex and we plan to continue same.        Past Medical History:   Diagnosis Date   • Arthritis    • Asthma    • Breast cancer (HCC)    • Breast cancer, left (HCC)    • Chronic cough    • Diabetes mellitus (HCC)    • Fibrocystic breast    • GERD (gastroesophageal reflux disease)    • Goiter     INWARD GOITER   • Hearing loss     left   • History of colon polyps    • History of skin cancer    • Hyperlipidemia    • Hypertension     OFF MEDS X  4-5 YEARS   • Lung nodule     FOLLOWED BY DR RAMÍREZ - WATCHING WITH REGULAR CT SCANS   • Overactive bladder    • Seasonal allergies    • Vertigo         Past Surgical History:   Procedure Laterality Date   • BREAST CYST EXCISION Left 2002   • BREAST LUMPECTOMY Left 8/18/2022     Procedure: left breast wire localized lumpectomy;  Surgeon: Sarah uJarez MD;  Location:  [facility];  Service: General;  Laterality: Left;   • CATARACT EXTRACTION EXTRACAPSULAR W/ INTRAOCULAR LENS IMPLANTATION     • COLONOSCOPY N/A 03/06/2017    TA polyps, IH   • COLONOSCOPY N/A 02/27/2019    Procedure: COLONOSCOPY TO CECUM WITH COLD BIOPSIES;  Surgeon: Marquez Alan MD;  Location:  [facility];  Service: Gastroenterology   • D & C HYSTEROSCOPY N/A 10/14/2020    Procedure: DILATATION AND CURETTAGE HYSTEROSCOPY WITH MYOSURE;  Surgeon: Mariano Calix MD;  Location:  [facility];  Service: Obstetrics/Gynecology;  Laterality: N/A;   • DENTAL PROCEDURE     • DILATATION AND CURETTAGE      MULTIPLE D/T MISCARRIAGES   • ENDOSCOPY N/A 06/08/2022    Procedure: ESOPHAGOGASTRODUODENOSCOPY with biopsy;  Surgeon: Marquez Alan MD;  Location: [facility] ENDOSCOPY;  Service: Gastroenterology;  Laterality: N/A;  gastritis   • EYE SURGERY  3/4/2015   • ORIF ANKLE FRACTURE Left     WITH INSTRUMENTATION   • SKIN CANCER EXCISION Right     LEG        Current Outpatient Medications on File Prior to Visit   Medication Sig Dispense Refill   • albuterol sulfate  (90 Base) MCG/ACT inhaler Inhale 2 puffs Every 4 (Four) Hours As Needed for Wheezing.     • aspirin 81 MG EC tablet Take 1 tablet by mouth Daily.     • budesonide-formoterol (SYMBICORT) 160-4.5 MCG/ACT inhaler Inhale 2 puffs 2 (two) times a day.     • fexofenadine-pseudoephedrine (ALLEGRA-D 24) 180-240 MG per 24 hr tablet Take 1 tablet by mouth As Needed for Allergies.     • fluticasone (FLONASE) 50 MCG/ACT nasal spray 2 sprays into the nostril(s) as directed by provider As Needed for Rhinitis.     • glipiZIDE (GLUCOTROL) 5 MG ER tablet Take 1 tablet by mouth Daily.     • metFORMIN (GLUCOPHAGE) 500 MG tablet Take 1 tablet by mouth every night at bedtime.     • montelukast (SINGULAIR) 10 MG tablet Take 1 tablet by mouth Every Night.     • nortriptyline  "(PAMELOR) 10 MG capsule Take 1 capsule by mouth At Night As Needed (Diarrhea). 90 capsule 3   • simvastatin (ZOCOR) 10 MG tablet Take 1 tablet by mouth Every Night.       No current facility-administered medications on file prior to visit.        ALLERGIES:    Allergies   Allergen Reactions   • Sulfa Antibiotics Other (See Comments)     CHILDHOOD REACTION         Social History     Socioeconomic History   • Marital status:      Spouse name: Kory   Tobacco Use   • Smoking status: Never   • Smokeless tobacco: Never   Vaping Use   • Vaping Use: Never used   Substance and Sexual Activity   • Alcohol use: Yes     Comment: RARELY   • Drug use: Never   • Sexual activity: Yes     Partners: Male     Birth control/protection: Post-menopausal, None        Family History   Problem Relation Age of Onset   • GI problems Mother         ileostomy   • Dementia Mother    • Pancreatic cancer Father    • Arthritis Father    • Diabetes Father    • Malig Hyperthermia Neg Hx    • Breast cancer Neg Hx    • Ovarian cancer Neg Hx         Review of Systems   Constitutional: Negative.    HENT: Negative.    Eyes: Negative.    Respiratory: Negative.    Cardiovascular: Negative.    Gastrointestinal: Negative.    Endocrine: Negative.    Genitourinary: Negative.    Musculoskeletal: Negative.    Skin: Negative.    Allergic/Immunologic: Negative.    Neurological: Negative.    Hematological: Negative.    Psychiatric/Behavioral: Negative.         Objective     Vitals:    03/10/23 1335   BP: 150/72   Pulse: 100   Resp: 20   Temp: 97.7 °F (36.5 °C)   TempSrc: Temporal   SpO2: 97%   Weight: 76 kg (167 lb 8 oz)   Height: 160 cm (62.99\")   PainSc: 0-No pain     Current Status 3/10/2023   ECOG score 0       Physical Exam  Constitutional:       Appearance: Normal appearance.   HENT:      Head: Normocephalic and atraumatic.      Nose: Nose normal.      Mouth/Throat:      Mouth: Mucous membranes are moist.      Pharynx: Oropharynx is clear.   Eyes:      " Extraocular Movements: Extraocular movements intact.      Conjunctiva/sclera: Conjunctivae normal.      Pupils: Pupils are equal, round, and reactive to light.   Cardiovascular:      Rate and Rhythm: Normal rate and regular rhythm.      Pulses: Normal pulses.      Heart sounds: Normal heart sounds.   Pulmonary:      Effort: Pulmonary effort is normal.      Breath sounds: Normal breath sounds.      Comments: Status post left breast lumpectomy, well-healed, mild erythema, no additional axillary adenopathy.  Abdominal:      General: Bowel sounds are normal.      Palpations: Abdomen is soft.   Musculoskeletal:         General: Deformity (Mild contracture felt just proximal to right fourth finger anteriorly) present. Normal range of motion.      Cervical back: Normal range of motion and neck supple.   Skin:     General: Skin is warm and dry.   Neurological:      General: No focal deficit present.      Mental Status: She is oriented to person, place, and time.   Psychiatric:         Mood and Affect: Mood normal.         Behavior: Behavior normal.           RECENT LABS:  Hematology WBC   Date Value Ref Range Status   03/10/2023 7.56 3.40 - 10.80 10*3/mm3 Final     RBC   Date Value Ref Range Status   03/10/2023 4.76 3.77 - 5.28 10*6/mm3 Final     Hemoglobin   Date Value Ref Range Status   03/10/2023 12.9 12.0 - 15.9 g/dL Final     Hematocrit   Date Value Ref Range Status   03/10/2023 40.9 34.0 - 46.6 % Final     Platelets   Date Value Ref Range Status   03/10/2023 238 140 - 450 10*3/mm3 Final          Assessment & Plan        78-year-old female with history of hypertension, hyperlipidemia, diabetes, GE reflux, rotator cuff injury as well as additional history of recent EGD with benign findings and the previous endometrial hyperplasia (please see record).      The patient underwent screening mammography/12/22 with microcalcifications seen in the anterior one third of the retroareolar region left breast and stereotactic  biopsy 6/13 showed DCIS of high nuclear grade strongly ER/WI positive.  She was seen by general surgery-Dr. Juarez-with MRI demonstrating the biopsy-proven site of DCIS and no other findings that might suggest malignancy in the left breast or right breast.  She underwent lumpectomy 8/18/2022 without residual tumor determined-stage - PTisNxMx.       The patient been seen by radiation therapy and is considering as to whether she will undergo this therapy.        We are asked to see her for adjuvant therapy concerns and have discussed endocrine therapy (tamoxifen versus AI therapy) that could reduce considerably her risk of invasive or noninvasive breast cancer.       After additional discussion particularly centering on her previous history including her endometrial hyperplasia it would seem prudent to have her undergo a bone density to determine her bone density status before we make a decision about adjuvant endocrine therapy of which she is considering as a total treatment rather than additional radiation therapy at this point.    Patient is next seen 10/28/2022 with bone density having been performed 10/17 demonstrating lumbar T score of 2.9, left hip T score of 1.0 and right hip T score of 0.5-normal bone density.    The patient and her  are advised of the normal bone density findings and have also decided, after reviewing it between themselves, not to proceed with radiation therapy.  This leads to conversation about the use of aromatase number therapy as primary treatment since tamoxifen may actually produce further endometrial thickening and continues her subsequent assessments of this issue.    The patient is next seen 12/8/2022 tolerating Arimidex well without substantial side effects.  We will plan to continue for an additional 3-month trial.    The patient was referred to hand surgery and underwent injection 12/30/2022.  This was successful and her hand pain and immobility has improved.  We have,  additionally, discussed Dupuytren contracture of which the findings physically are suggestive.  Otherwise she is not having any issues tolerating Arimidex and we plan to continue same.        Plan:  *Continue Arimidex, 6-month return    *90-day supply E scribed to pharmacy with 3 refills    *The patient and  are agreeable this plan and follow-up

## 2023-04-24 ENCOUNTER — HOSPITAL ENCOUNTER (OUTPATIENT)
Dept: CT IMAGING | Facility: HOSPITAL | Age: 78
Discharge: HOME | End: 2023-04-24
Payer: MEDICARE

## 2023-04-24 ENCOUNTER — HOSPITAL ENCOUNTER (OUTPATIENT)
Dept: MAMMOGRAPHY | Facility: HOSPITAL | Age: 78
Discharge: HOME | End: 2023-04-24
Payer: MEDICARE

## 2023-04-24 DIAGNOSIS — Z12.31 VISIT FOR SCREENING MAMMOGRAM: ICD-10-CM

## 2023-04-24 DIAGNOSIS — C50.919 MALIGNANT NEOPLASM OF FEMALE BREAST, UNSPECIFIED ESTROGEN RECEPTOR STATUS, UNSPECIFIED LATERALITY, UNSPECIFIED SITE OF BREAST: ICD-10-CM

## 2023-04-24 PROCEDURE — 77063 BREAST TOMOSYNTHESIS BI: CPT

## 2023-04-24 PROCEDURE — 71250 CT THORAX DX C-: CPT

## 2023-04-24 PROCEDURE — 77067 SCR MAMMO BI INCL CAD: CPT

## 2023-04-25 ENCOUNTER — TELEPHONE (OUTPATIENT)
Dept: SURGERY | Facility: CLINIC | Age: 78
End: 2023-04-25
Payer: MEDICARE

## 2023-04-25 NOTE — TELEPHONE ENCOUNTER
----- Message from Sarah Juarez MD sent at 4/24/2023  5:58 PM EDT -----  Regarding: mammogram results  Can you let her know her mammogram was normal? She should follow up with Stephanie as scheduled.     BI-RADS Category 2: Benign findings.    Thanks,   Sarah Juarez

## 2023-04-25 NOTE — TELEPHONE ENCOUNTER
Spoke with patient and informed her of results and follow-up per Dr. Juarez.  She voiced understanding.

## 2023-04-26 ENCOUNTER — TRANSCRIBE ORDERS (OUTPATIENT)
Dept: ADMINISTRATIVE | Facility: HOSPITAL | Age: 78
End: 2023-04-26
Payer: MEDICARE

## 2023-04-26 DIAGNOSIS — R93.89 ABNORMAL CT OF THE CHEST: Primary | ICD-10-CM

## 2023-05-05 ENCOUNTER — HOSPITAL ENCOUNTER (OUTPATIENT)
Dept: ULTRASOUND IMAGING | Facility: HOSPITAL | Age: 78
Discharge: HOME | End: 2023-05-05
Payer: MEDICARE

## 2023-05-05 DIAGNOSIS — R93.89 ABNORMAL CT OF THE CHEST: ICD-10-CM

## 2023-05-05 PROCEDURE — 76705 ECHO EXAM OF ABDOMEN: CPT

## 2023-05-09 NOTE — PROGRESS NOTES
Assessment/Plan:  Olinda Baumann is a 78 y.o. lady who presents with a history of left breast DCIS: Grade III, ER+/PA+; [id number], Stage 0.      A multidisciplinary plan has been formulated for the patient:    (1) Breast Surgical Oncology:  - Status post left breast wire localized lumpectomy 8/2022 with Dr. Juarez.  - Screening mammogram 04/2023 reviewed, BIRADS 2.  - Annual screening mammogram due 4/2024.   - Follow up in 5/2024.    (2) Medical Oncology:  - Following with Dr. Rasmussen, next appointment 9/15/2023.  - Currently on Arimidex, tolerating well, denies any side effects.     (3) Radiation Oncology:  - Followed with Dr. Harley.  - Patient decided not to proceed with radiation.    (4) Health Maintenance:  - Continue following with PCP.   - Colonoscopy 02/2019 Dr. Alan, 5 year surveillance (due 2024).    Chief Complaint: abnormal breast imaging    History of Present Illness: Ms. Olinda Baumann is a 78 y.o. year old lady, seen at the request of No ref. provider found for a new diagnosis of left breast cancer.    This was initially detected as an imaging abnormality. She has had annual mammograms each year. She has no prior history of breast biopsy. Her work-up is detailed in the oncologic history below.   She denies any breast lumps, pain, skin changes, or nipple discharge. She denies any family history of breast or ovarian cancer.     9/12/2022 she presents today for follow-up.  She is doing very well.  Her pain is controlled.  She has no concerns with her incision.  She complains of a few small aches along the lateral aspect of her chest wall.  He is getting back to her daily activities.    5/10/2023 She is here today for a follow up. She has recently been having trouble with her asthma and allergies. She has been using her inhaler and recently started using a nebulizer to help as well. Otherwise, denies any complaints. Denies any new breast concerns.  Denies any breast lumps, pain, or skin changes. She  recently had her mammogram done and it was benign. She is tolerating her Arimidex.     Workup of Current Diagnosis:    4/12/2022 Bilateral Screening Mammogram  IMPRESSION:  1. There are multiple microcalcifications seen in the anterior one-third retroareolar region of the left breast. Further evaluation with spot magnification CC and 90 degree lateral images is recommended.  2. There is an area of focal asymmetry in the middle third lateral aspect of the right breast. Further evaluation with spot compression CC mammographic and tomosynthesis images and possible targeted right breast sonography is recommended.  BI-RADS Category 0: Incomplete - Needs additional imaging evaluation.    5/12/2022 Bilateral Diagnostic Mammogram   In the right breast in the middle third lateral aspect of the previous described area of focal asymmetry resolves with additional imaging. This is consistent with normal breast parenchyma. No suspicious findings in the right breast are visualized.  In the left breast additional imaging demonstrates the presence of multiple microcalcifications in the cluster with a linear distribution spanning a distance of approximately 1.8 cm seen in the retroareolar region of the left breast at the 6 clock position. There is a question of mild pleomorphism.  IMPRESSION:  1. There is a 1.8 cm cluster of linear microcalcifications in the retroareolar region of the left breast at the 6 o'clock position. Correlation with a stereo-tactic guided left breast biopsy is recommended.  2. There are no findings suspicious for malignancy in the right breast.  BI-RADS Category 4: Suspicious abnormality. Biopsy should be considered.    6/13/2022 Left Breast Stereotactic Biopsy  The calcifications in the anterior left breast was/were localized and targeted under stereotactic/tomosynthesis guidance via a(n) medial approach. 4 core specimens were obtained. Specimen radiography demonstrated the targeted calcifications. A eloy  clip was then deployed at the biopsy site. The patient tolerated the procedure well with no immediate complications.  Post-procedural digital mammographic views of the left breast demonstrate the tribell clip at the expected location at the site of biopsy in the lower central anterior left breast at the lateral margin of residual calcifications.  IMPRESSION:  1. Stereotactic/Tomosynthesis guided core needle biopsy of a 1.8 cm group of calcifications at 6:00 in the retroareolar left breast, marked with a tribell biopsy clip, where there are residual calcifications. Pathology is malignant and concordant with the imaging assessment. Surgical consultation is recommended. Contrast-enhanced breast MRI should be considered to evaluate extent of disease.     6/13/2022 Pathology  Final Diagnosis   1. Breast, Left, Retro, 6 O'clock, Biopsy: Ductal carcinoma in situ (DCIS).               A. The ductal carcinoma in situ is of high nuclear grade with comedo, solid and cribriform architecture and expansive necrosis.                 B. Microcalcifications are associated with the ductal carcinoma in situ.               C. DCIS measures up to 3 mm on the slide.     7/2/2022 Bilateral Breast MRI  IMPRESSION:  1. Biopsy-proven site of DCIS in the left breast in the retroareolar region at the 6 o'clock position with the Tri-Bell shaped metallic clip located within a postbiopsy cavity that measures up to 1.3 cm in greatest dimension. Only nonspecific enhancement surrounding the cavity is visualized. No evidence for left axillary adenopathy is appreciated and no other suspicious findings are seen within the left breast. If breast conservation therapy is desired then surgical excision of any remaining suspicious microcalcifications at the biopsy site is recommended.  2. There are no findings suspicious for malignancy in the right breast.   BI-RADS CATEGORY 6: Known biopsy-proven malignancy.    8/18/2022 Left breast wire localized  lumpectomy  Final Diagnosis   1. Left Breast, Needle Localization Lumpectomy:               A. Biopsy site changes are identified and metallic clip retrieved.               B. No ductal carcinoma in-situ nor invasive carcinoma identified.               C. No lobular neoplasia (LCIS, ALH) identified (see comment).               D. Scattered adenosis, sclerosing adenosis, fibroadenomatous hyperplasia, fibrocystic change, usual ductal hyperplasia and focal clustered ductal cysts identified.  E. Rare calcification present in benign breast tissue.  F. Surgical margins are viable and negative for malignancy.  G. Rare microcalcification noted within benign breast tissue.   2. Left Breast, Additional Superior Margin:                 A. No atypical hyperplasia, in-situ carcinoma nor invasive carcinoma identified.               B. New margin is negative for malignancy by additional 8 mm.  3. Left Breast, Additional Inferior Margin:                 A. No atypical hyperplasia, in-situ carcinoma nor invasive carcinoma identified.               B. New margin is negative for malignancy by additional 10 mm.  4. Left Breast, Additional Medial Margin:                 A. No atypical hyperplasia, in-situ carcinoma nor invasive carcinoma identified.               B. New margin is negative for malignancy by additional 7 mm.  5. Left Breast, Additional Lateral Margin:                 A. No atypical hyperplasia, in-situ carcinoma nor invasive carcinoma identified.               B. New margin is negative for malignancy by additional 6 mm.  6. Left Breast, Additional Anterior Margin:                 A. No atypical hyperplasia, in-situ carcinoma nor invasive carcinoma identified.               B. New margin is negative for malignancy by additional 12 mm.  7. Left Breast, Additional Posterior Margin:                 A. No atypical hyperplasia, in-situ carcinoma nor invasive carcinoma identified.               B. New margin is negative for  malignancy by additional 12 mm.     2023 Bilateral Screening Mammogram  There is scattered fibroglandular density which is symmetric. There are no findings to suggest malignancy. The right breast shows no change from 2023. The patient has had left breast surgery for breast cancer and there are minimal postsurgical changes that appear benign.  CONCLUSION: Benign bilateral mammogram.   BI-RADS Category 2: Benign findings.    Gynecologic History:   . P:0 AB:6  Age at first childbirth: N/A  Lactation/How long: N/A  Age at menarche: 13  Age at menopause: 51  Total years of oral contraceptive use: 5-6 years previously  Total years of hormone replacement therapy: No    Past Medical History:   • DM  • Asthma   • HLD    Past Surgical History:    • Left ankle surgery   • L breast cyst excision, 20 years ago, benign  • Bilateral cataract extraction   • D&C  • R leg skin cancer excision    Family History:    • As above    Social History:  • Denies tobacco use  • Occasional alcohol use    Allergies:   Allergies   Allergen Reactions   • Sulfa Antibiotics Other (See Comments)     CHILDHOOD REACTION      Medications:     Current Outpatient Medications:   •  albuterol sulfate  (90 Base) MCG/ACT inhaler, Inhale 2 puffs Every 4 (Four) Hours As Needed for Wheezing., Disp: , Rfl:   •  anastrozole (ARIMIDEX) 1 MG tablet, Take 1 tablet by mouth Daily., Disp: 90 tablet, Rfl: 3  •  aspirin 81 MG EC tablet, Take 1 tablet by mouth Daily., Disp: , Rfl:   •  budesonide-formoterol (SYMBICORT) 160-4.5 MCG/ACT inhaler, Inhale 2 puffs 2 (two) times a day., Disp: , Rfl:   •  fexofenadine-pseudoephedrine (ALLEGRA-D 24) 180-240 MG per 24 hr tablet, Take 1 tablet by mouth As Needed for Allergies., Disp: , Rfl:   •  fluticasone (FLONASE) 50 MCG/ACT nasal spray, 2 sprays into the nostril(s) as directed by provider As Needed for Rhinitis., Disp: , Rfl:   •  glipiZIDE (GLUCOTROL) 5 MG ER tablet, Take 1 tablet by mouth Daily., Disp: ,  Rfl:   •  metFORMIN (GLUCOPHAGE) 500 MG tablet, Take 1 tablet by mouth every night at bedtime., Disp: , Rfl:   •  montelukast (SINGULAIR) 10 MG tablet, Take 1 tablet by mouth Every Night., Disp: , Rfl:   •  nortriptyline (PAMELOR) 10 MG capsule, Take 1 capsule by mouth At Night As Needed (Diarrhea)., Disp: 90 capsule, Rfl: 3  •  simvastatin (ZOCOR) 10 MG tablet, Take 1 tablet by mouth Every Night., Disp: , Rfl:     Laboratory Values:    Labs from 3/2023 reviewed, glucose 272    Review of Systems:   Constitutional: Negative for fevers or chills  HENT: Negative for hearing loss or runny nose  Eyes: Negative for vision changes or scleral icterus  Respiratory: Positive for cough, Negative for shortness of breath  Cardiovascular: Negative for chest pain or heart palpitations  Gastrointestinal: Negative for abdominal pain, nausea, vomiting, constipation, melena, or hematochezia  Genitourinary: Negative for hematuria or dysuria  Musculoskeletal: Negative for joint swelling or gait instability  Neurologic: Negative for tremors or seizures  Psychiatric: Negative for suicidal ideations or depression  All other systems reviewed and negative    Physical Exam:   • ECO - Asymptomatic  • Constitutional: Well-developed, well-nourished, no acute distress  • Eyes: Conjunctiva normal, sclera nonicteric  • ENMT: Hearing grossly normal, oral mucosa moist  • Neck: Supple, no palpable mass, trachea midline  • Respiratory: Clear to auscultation, normal inspiratory effort  • Cardiovascular: Regular rate, no peripheral edema, no jugular venous distention  • Breast:   o Right: No visible abnormalities on inspection while seated, with arms raised or hands on hips. No masses, skin changes, or nipple abnormalities.  o Left: No visible abnormalities on inspection while seated, with arms raised or hands on hips. No masses, skin changes, or nipple abnormalities.  Left breast periareolar incision at 6:00 healed. No seroma or hematoma noted.    o No clinical chest wall involvement.  • Lymphatics (palpable nodes): No cervical, supraclavicular, or axillary lymphadenopathy  • Skin: Warm, dry, no rash on visualized skin surfaces  • Musculoskeletal: Symmetric strength, normal gait  • Psychiatric: Alert and oriented ×3, normal affect    Stephanie Sheffield PA-C    [facility] - General Surgery   [address], [address]  [place] [address]  Office: [phone]  Fax: [phone]

## 2023-05-10 ENCOUNTER — OFFICE VISIT (OUTPATIENT)
Dept: SURGERY | Facility: CLINIC | Age: 78
End: 2023-05-10
Payer: MEDICARE

## 2023-05-10 VITALS — HEIGHT: 62 IN | WEIGHT: 167 LBS | BODY MASS INDEX: 30.73 KG/M2

## 2023-05-10 DIAGNOSIS — D05.12 DUCTAL CARCINOMA IN SITU (DCIS) OF LEFT BREAST: Primary | ICD-10-CM

## 2023-09-14 NOTE — PROGRESS NOTES
REASON FOR FOLLOW-UP: DCIS of left breast.      History of Present Illness   The patient 78-year-old female followed by primary care with the below medical disorders including hypertension, hyperlipidemia, diabetes mellitus and GE reflux as well as rotator cuff tear 3/18/2022 status post steroid injection therapy.  She also underwent EGD 6/8/2022 with normal findings, antral gastritis noted and several small diminutive gastric polyps.    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  ER 99%, RI 99%.    The patient was seen by Dr. Juarez of general surgery in then proceeded with [employer] 9 panel genetic testing, subsequent MRI 7/2/2022 demonstrating her biopsy-proven site of DCIS up to 1.3 cm, nonspecific enhancement surrounding the cavity and no other findings that might suggest further malignancy including the right breast.    The patient 8/18/2022 underwent a left breast localized lumpectomy with pathology revealing left breast now without additional DCIS or LCIS or ALH   determined- PTisNxMx.    The patient was seen 9/12/2022 for radiation therapy consultation with a [place] vacation planned in approximately 3 weeks and the patient to return just after her trip in mid October 2022 with plans to proceed with [phone] in 15 fractions.    She is now seen in office 9/14/2022 for consideration of adjuvant therapy.    Additional historical point includes uterine abnormalities noted 7/16/2021 on ultrasound pelvis and transvaginal ultrasound leading to endometrial biopsy that was negative.    Patient is next seen  10/28/2022 with bone density having been performed 10/17 demonstrating lumbar T score of 2.9, left hip T score of 1.0 and right hip T score of 0.5-normal bone density.    She is feeling well though has reviewed the possibility of radiation therapy with she and her  decided not to proceed with it.  As result we discussed moving to endocrine therapy choosing between that which would not confuse the findings of her endometrial thickening.  As result of her normal bone density this will be an aromatase inhibitor such as Arimidex.  At length she and her  are willing to proceed with a trial of this at least over the next month.  This therapy, however, will be offered over 5-year length.    The patient seen back in follow-up 12/8/2022.  She is tolerating Arimidex without complication though is having further issues with her right hand and trigger finger that has been present for many years.    The patient was referred to hand surgery and underwent injection 12/30/2022.  This was successful and her hand pain and immobility has improved.  We have, additionally, discussed Dupuytren contracture of which the findings physically are suggestive.  Otherwise she is not having any issues tolerating Arimidex and we plan to continue same.    The patient is next seen 9/15/2023 doing reasonably well without any additional issues.  She was able to have her Dupuytren contraction treated successfully and is not having joint discomfort from the use of AI therapy.        Past Medical History:   Diagnosis Date    Arthritis     Asthma     Breast cancer     Breast cancer, left     Chronic cough     Diabetes mellitus     Fibrocystic breast     GERD (gastroesophageal reflux disease)     Goiter     INWARD GOITER    Hearing loss     left    History of colon polyps     History of skin cancer     Hyperlipidemia     Hypertension     OFF MEDS X  4-5 YEARS    Lung nodule     FOLLOWED BY DR RMAÍREZ - WATCHING WITH REGULAR CT SCANS     Overactive bladder     Seasonal allergies     Vertigo         Past Surgical History:   Procedure Laterality Date    BREAST CYST EXCISION Left 2002    BREAST LUMPECTOMY Left 8/18/2022    Procedure: left breast wire localized lumpectomy;  Surgeon: Sarah Juarez MD;  Location:  [facility];  Service: General;  Laterality: Left;    CATARACT EXTRACTION EXTRACAPSULAR W/ INTRAOCULAR LENS IMPLANTATION      COLONOSCOPY N/A 03/06/2017    TA polyps, IH    COLONOSCOPY N/A 02/27/2019    Procedure: COLONOSCOPY TO CECUM WITH COLD BIOPSIES;  Surgeon: Marquez Alan MD;  Location: [facility] ENDOSCOPY;  Service: Gastroenterology    D & C HYSTEROSCOPY N/A 10/14/2020    Procedure: DILATATION AND CURETTAGE HYSTEROSCOPY WITH MYOSURE;  Surgeon: Mariano Calix MD;  Location: [facility];  Service: Obstetrics/Gynecology;  Laterality: N/A;    DENTAL PROCEDURE      DILATATION AND CURETTAGE      MULTIPLE D/T MISCARRIAGES    ENDOSCOPY N/A 06/08/2022    Procedure: ESOPHAGOGASTRODUODENOSCOPY with biopsy;  Surgeon: Marquez Alan MD;  Location: [facility] ENDOSCOPY;  Service: Gastroenterology;  Laterality: N/A;  gastritis    EYE SURGERY  3/4/2015    ORIF ANKLE FRACTURE Left     WITH INSTRUMENTATION    SKIN CANCER EXCISION Right     LEG        Current Outpatient Medications on File Prior to Visit   Medication Sig Dispense Refill    albuterol sulfate  (90 Base) MCG/ACT inhaler Inhale 2 puffs Every 4 (Four) Hours As Needed for Wheezing.      anastrozole (ARIMIDEX) 1 MG tablet Take 1 tablet by mouth Daily. 90 tablet 3    aspirin 81 MG EC tablet Take 1 tablet by mouth Daily.      budesonide-formoterol (SYMBICORT) 160-4.5 MCG/ACT inhaler Inhale 2 puffs 2 (two) times a day.      fexofenadine-pseudoephedrine (ALLEGRA-D 24) 180-240 MG per 24 hr tablet Take 1 tablet by mouth As Needed for Allergies.      fluticasone (FLONASE) 50 MCG/ACT nasal spray 2 sprays into the nostril(s) as directed by provider As Needed for Rhinitis.      glipiZIDE  "(GLUCOTROL) 5 MG ER tablet Take 1 tablet by mouth Daily.      metFORMIN (GLUCOPHAGE) 500 MG tablet Take 1 tablet by mouth every night at bedtime.      montelukast (SINGULAIR) 10 MG tablet Take 1 tablet by mouth Every Night.      nortriptyline (PAMELOR) 10 MG capsule Take 1 capsule by mouth At Night As Needed (Diarrhea). 90 capsule 3    simvastatin (ZOCOR) 10 MG tablet Take 1 tablet by mouth Every Night.       No current facility-administered medications on file prior to visit.        ALLERGIES:    Allergies   Allergen Reactions    Sulfa Antibiotics Other (See Comments)     CHILDHOOD REACTION         Social History     Socioeconomic History    Marital status:      Spouse name: Kory   Tobacco Use    Smoking status: Never    Smokeless tobacco: Never   Vaping Use    Vaping Use: Never used   Substance and Sexual Activity    Alcohol use: Yes     Comment: RARELY    Drug use: Never    Sexual activity: Yes     Partners: Male     Birth control/protection: Post-menopausal, None        Family History   Problem Relation Age of Onset    GI problems Mother         ileostomy    Dementia Mother     Pancreatic cancer Father     Arthritis Father     Diabetes Father     Malig Hyperthermia Neg Hx     Breast cancer Neg Hx     Ovarian cancer Neg Hx         Review of Systems   Constitutional: Negative.    HENT: Negative.     Eyes: Negative.    Respiratory: Negative.     Cardiovascular: Negative.    Gastrointestinal: Negative.    Endocrine: Negative.    Genitourinary: Negative.    Musculoskeletal: Negative.    Skin: Negative.    Allergic/Immunologic: Negative.    Neurological: Negative.    Hematological: Negative.    Psychiatric/Behavioral: Negative.        Objective     Vitals:    09/15/23 1058   BP: 146/69   Pulse: 78   Resp: 18   Temp: 97.7 °F (36.5 °C)   TempSrc: Temporal   SpO2: 99%   Weight: 75.4 kg (166 lb 3.2 oz)   Height: 157.5 cm (62.01\")   PainSc: 0-No pain         9/15/2023    10:59 AM   Current Status   ECOG score 0 "       Physical Exam  Constitutional:       Appearance: Normal appearance.   HENT:      Head: Normocephalic and atraumatic.      Nose: Nose normal.      Mouth/Throat:      Mouth: Mucous membranes are moist.      Pharynx: Oropharynx is clear.   Eyes:      Extraocular Movements: Extraocular movements intact.      Conjunctiva/sclera: Conjunctivae normal.      Pupils: Pupils are equal, round, and reactive to light.   Cardiovascular:      Rate and Rhythm: Normal rate and regular rhythm.      Pulses: Normal pulses.      Heart sounds: Normal heart sounds.   Pulmonary:      Effort: Pulmonary effort is normal.      Breath sounds: Normal breath sounds.      Comments: Status post left breast lumpectomy, well-healed, mild erythema, no additional axillary adenopathy.  Abdominal:      General: Bowel sounds are normal.      Palpations: Abdomen is soft.   Musculoskeletal:         General: Deformity (Mild contracture felt just proximal to right fourth finger anteriorly) present. Normal range of motion.      Cervical back: Normal range of motion and neck supple.   Skin:     General: Skin is warm and dry.   Neurological:      General: No focal deficit present.      Mental Status: She is oriented to person, place, and time.   Psychiatric:         Mood and Affect: Mood normal.         Behavior: Behavior normal.         RECENT LABS:  Hematology WBC   Date Value Ref Range Status   09/15/2023 7.18 3.40 - 10.80 10*3/mm3 Final     RBC   Date Value Ref Range Status   09/15/2023 4.80 3.77 - 5.28 10*6/mm3 Final     Hemoglobin   Date Value Ref Range Status   09/15/2023 13.4 12.0 - 15.9 g/dL Final     Hematocrit   Date Value Ref Range Status   09/15/2023 41.1 34.0 - 46.6 % Final     Platelets   Date Value Ref Range Status   09/15/2023 228 140 - 450 10*3/mm3 Final          Assessment & Plan        78-year-old female with history of hypertension, hyperlipidemia, diabetes, GE reflux, rotator cuff injury as well as additional history of recent EGD  with benign findings and the previous endometrial hyperplasia (please see record).      The patient underwent screening mammography/12/22 with microcalcifications seen in the anterior one third of the retroareolar region left breast and stereotactic biopsy 6/13 showed DCIS of high nuclear grade strongly ER/AZ positive.  She was seen by general surgery-Dr. Juarez-with MRI demonstrating the biopsy-proven site of DCIS and no other findings that might suggest malignancy in the left breast or right breast.  She underwent lumpectomy 8/18/2022 without residual tumor determined-stage - PTisNxMx.       The patient been seen by radiation therapy and is considering as to whether she will undergo this therapy.        We are asked to see her for adjuvant therapy concerns and have discussed endocrine therapy (tamoxifen versus AI therapy) that could reduce considerably her risk of invasive or noninvasive breast cancer.       After additional discussion particularly centering on her previous history including her endometrial hyperplasia it would seem prudent to have her undergo a bone density to determine her bone density status before we make a decision about adjuvant endocrine therapy of which she is considering as a total treatment rather than additional radiation therapy at this point.    Patient is next seen 10/28/2022 with bone density having been performed 10/17 demonstrating lumbar T score of 2.9, left hip T score of 1.0 and right hip T score of 0.5-normal bone density.    The patient and her  are advised of the normal bone density findings and have also decided, after reviewing it between themselves, not to proceed with radiation therapy.  This leads to conversation about the use of aromatase number therapy as primary treatment since tamoxifen may actually produce further endometrial thickening and continues her subsequent assessments of this issue.    The patient is next seen 12/8/2022 tolerating Arimidex well  without substantial side effects.  We will plan to continue for an additional 3-month trial.    The patient was referred to hand surgery and underwent injection 12/30/2022.  This was successful and her hand pain and immobility has improved.  We have, additionally, discussed Dupuytren contracture of which the findings physically are suggestive.  Otherwise she is not having any issues tolerating Arimidex and we plan to continue same.    The patient is next seen 9/15/2023 doing reasonably well without any additional issues.  She was able to have her Dupuytren contraction treated successfully and is not having joint discomfort from the use of AI therapy.      Plan:  *Continue Arimidex, 6-month return, CMP, CBC, NP    *The patient and  are agreeable this plan and follow-up

## 2023-09-15 ENCOUNTER — LAB (OUTPATIENT)
Dept: LAB | Facility: HOSPITAL | Age: 78
End: 2023-09-15
Payer: MEDICARE

## 2023-09-15 ENCOUNTER — OFFICE VISIT (OUTPATIENT)
Dept: ONCOLOGY | Facility: CLINIC | Age: 78
End: 2023-09-15
Payer: MEDICARE

## 2023-09-15 VITALS
RESPIRATION RATE: 18 BRPM | TEMPERATURE: 97.7 F | BODY MASS INDEX: 30.59 KG/M2 | HEIGHT: 62 IN | HEART RATE: 78 BPM | DIASTOLIC BLOOD PRESSURE: 69 MMHG | WEIGHT: 166.2 LBS | SYSTOLIC BLOOD PRESSURE: 146 MMHG | OXYGEN SATURATION: 99 %

## 2023-09-15 DIAGNOSIS — D05.12 DUCTAL CARCINOMA IN SITU (DCIS) OF LEFT BREAST: ICD-10-CM

## 2023-09-15 DIAGNOSIS — D05.12 DUCTAL CARCINOMA IN SITU (DCIS) OF LEFT BREAST: Primary | ICD-10-CM

## 2023-09-15 LAB
ALBUMIN SERPL-MCNC: 4.1 G/DL (ref 3.5–5.2)
ALBUMIN/GLOB SERPL: 1.6 G/DL
ALP SERPL-CCNC: 89 U/L (ref 39–117)
ALT SERPL W P-5'-P-CCNC: 15 U/L (ref 1–33)
ANION GAP SERPL CALCULATED.3IONS-SCNC: 13.5 MMOL/L (ref 5–15)
AST SERPL-CCNC: 17 U/L (ref 1–32)
BASOPHILS # BLD AUTO: 0.02 10*3/MM3 (ref 0–0.2)
BASOPHILS NFR BLD AUTO: 0.3 % (ref 0–1.5)
BILIRUB SERPL-MCNC: 0.4 MG/DL (ref 0–1.2)
BUN SERPL-MCNC: 10 MG/DL (ref 8–23)
BUN/CREAT SERPL: 16.7 (ref 7–25)
CALCIUM SPEC-SCNC: 9.1 MG/DL (ref 8.6–10.5)
CHLORIDE SERPL-SCNC: 107 MMOL/L (ref 98–107)
CO2 SERPL-SCNC: 22.5 MMOL/L (ref 22–29)
CREAT SERPL-MCNC: 0.6 MG/DL (ref 0.6–1.1)
DEPRECATED RDW RBC AUTO: 43.3 FL (ref 37–54)
EGFRCR SERPLBLD CKD-EPI 2021: 92.6 ML/MIN/1.73
EOSINOPHIL # BLD AUTO: 0.07 10*3/MM3 (ref 0–0.4)
EOSINOPHIL NFR BLD AUTO: 1 % (ref 0.3–6.2)
ERYTHROCYTE [DISTWIDTH] IN BLOOD BY AUTOMATED COUNT: 13.9 % (ref 12.3–15.4)
GLOBULIN UR ELPH-MCNC: 2.6 GM/DL
GLUCOSE SERPL-MCNC: 114 MG/DL (ref 65–99)
HCT VFR BLD AUTO: 41.1 % (ref 34–46.6)
HGB BLD-MCNC: 13.4 G/DL (ref 12–15.9)
IMM GRANULOCYTES # BLD AUTO: 0.02 10*3/MM3 (ref 0–0.05)
IMM GRANULOCYTES NFR BLD AUTO: 0.3 % (ref 0–0.5)
LYMPHOCYTES # BLD AUTO: 2.28 10*3/MM3 (ref 0.7–3.1)
LYMPHOCYTES NFR BLD AUTO: 31.8 % (ref 19.6–45.3)
MCH RBC QN AUTO: 27.9 PG (ref 26.6–33)
MCHC RBC AUTO-ENTMCNC: 32.6 G/DL (ref 31.5–35.7)
MCV RBC AUTO: 85.6 FL (ref 79–97)
MONOCYTES # BLD AUTO: 0.61 10*3/MM3 (ref 0.1–0.9)
MONOCYTES NFR BLD AUTO: 8.5 % (ref 5–12)
NEUTROPHILS NFR BLD AUTO: 4.18 10*3/MM3 (ref 1.7–7)
NEUTROPHILS NFR BLD AUTO: 58.1 % (ref 42.7–76)
NRBC BLD AUTO-RTO: 0 /100 WBC (ref 0–0.2)
PLATELET # BLD AUTO: 228 10*3/MM3 (ref 140–450)
PMV BLD AUTO: 8.4 FL (ref 6–12)
POTASSIUM SERPL-SCNC: 4.2 MMOL/L (ref 3.5–5.2)
PROT SERPL-MCNC: 6.7 G/DL (ref 6–8.5)
RBC # BLD AUTO: 4.8 10*6/MM3 (ref 3.77–5.28)
SODIUM SERPL-SCNC: 143 MMOL/L (ref 136–145)
WBC NRBC COR # BLD: 7.18 10*3/MM3 (ref 3.4–10.8)

## 2023-09-15 PROCEDURE — 80053 COMPREHEN METABOLIC PANEL: CPT

## 2023-09-15 PROCEDURE — 1126F AMNT PAIN NOTED NONE PRSNT: CPT | Performed by: INTERNAL MEDICINE

## 2023-09-15 PROCEDURE — 36415 COLL VENOUS BLD VENIPUNCTURE: CPT

## 2023-09-15 PROCEDURE — 85025 COMPLETE CBC W/AUTO DIFF WBC: CPT

## 2023-09-15 PROCEDURE — 99213 OFFICE O/P EST LOW 20 MIN: CPT | Performed by: INTERNAL MEDICINE

## 2023-12-04 ENCOUNTER — OFFICE VISIT (OUTPATIENT)
Dept: OBSTETRICS AND GYNECOLOGY | Age: 78
End: 2023-12-04
Payer: MEDICARE

## 2023-12-04 VITALS
BODY MASS INDEX: 30.55 KG/M2 | SYSTOLIC BLOOD PRESSURE: 142 MMHG | WEIGHT: 166 LBS | HEIGHT: 62 IN | DIASTOLIC BLOOD PRESSURE: 80 MMHG

## 2023-12-04 DIAGNOSIS — Z12.31 BREAST CANCER SCREENING BY MAMMOGRAM: ICD-10-CM

## 2023-12-04 DIAGNOSIS — N95.2 ATROPHIC VAGINITIS: ICD-10-CM

## 2023-12-04 DIAGNOSIS — D05.12 DUCTAL CARCINOMA IN SITU (DCIS) OF LEFT BREAST: Primary | ICD-10-CM

## 2023-12-04 PROBLEM — N84.0 ENDOMETRIAL POLYP: Status: RESOLVED | Noted: 2020-07-30 | Resolved: 2023-12-04

## 2023-12-04 RX ORDER — BUDESONIDE, GLYCOPYRROLATE, AND FORMOTEROL FUMARATE 160; 9; 4.8 UG/1; UG/1; UG/1
2 AEROSOL, METERED RESPIRATORY (INHALATION)
COMMUNITY
Start: 2023-11-29

## 2023-12-04 NOTE — PROGRESS NOTES
Subjective   Chief Complaint   Patient presents with    Gynecologic Exam     Problem: LAC ,last pap ,Hx: Breast cancer,,dexa 10/22,colonoscopy       History of Present Illness  Wellness exam  Olinda Baumann is a very pleasant  78 y.o. female .  , Mammo Exam ordered, , Exercise encouraged  Patient without gynecological concerns.  We previously had performed an endometrial biopsy in  which benign she had a MyoSure which was consistent with a benign polyp follow-up exams have been normal she has had no discharge no bleeding no GYN concerns whatsoever.  She knows to call us immediately if she does have any of the symptoms..    Obstetric History:  OB History               Para        Term   0            AB        Living             SAB        IAB        Ectopic        Molar        Multiple        Live Births                   Menstrual History:     No LMP recorded. Patient is postmenopausal.       Sexual History:       Past Medical History:   Diagnosis Date    Arthritis     Asthma     Breast cancer     Breast cancer, left     Chronic cough     Diabetes mellitus     Fibrocystic breast     GERD (gastroesophageal reflux disease)     Goiter     INWARD GOITER    Hearing loss     left    History of colon polyps     History of skin cancer     Hyperlipidemia     Hypertension     OFF MEDS X  4-5 YEARS    Lung nodule     FOLLOWED BY DR RAMÍREZ - WATCHING WITH REGULAR CT SCANS    Overactive bladder     Seasonal allergies     Vertigo      Past Surgical History:   Procedure Laterality Date    BREAST CYST EXCISION Left     BREAST LUMPECTOMY Left 2022    Procedure: left breast wire localized lumpectomy;  Surgeon: Sarah Juaerz MD;  Location: [facility];  Service: General;  Laterality: Left;    CATARACT EXTRACTION EXTRACAPSULAR W/ INTRAOCULAR LENS IMPLANTATION      COLONOSCOPY N/A 2017    TA polyps, IH    COLONOSCOPY N/A 2019    Procedure: COLONOSCOPY TO CECUM WITH COLD  BIOPSIES;  Surgeon: Marquez Alan MD;  Location: [facility] ENDOSCOPY;  Service: Gastroenterology    D & C HYSTEROSCOPY N/A 10/14/2020    Procedure: DILATATION AND CURETTAGE HYSTEROSCOPY WITH MYOSURE;  Surgeon: Mariano Calix MD;  Location: [facility];  Service: Obstetrics/Gynecology;  Laterality: N/A;    DENTAL PROCEDURE      DILATATION AND CURETTAGE      MULTIPLE D/T MISCARRIAGES    ENDOSCOPY N/A 06/08/2022    Procedure: ESOPHAGOGASTRODUODENOSCOPY with biopsy;  Surgeon: Marquez Alan MD;  Location: [facility] ENDOSCOPY;  Service: Gastroenterology;  Laterality: N/A;  gastritis    EYE SURGERY  3/4/2015    ORIF ANKLE FRACTURE Left     WITH INSTRUMENTATION    SKIN CANCER EXCISION Right     LEG       Current Outpatient Medications:     albuterol sulfate  (90 Base) MCG/ACT inhaler, Inhale 2 puffs Every 4 (Four) Hours As Needed for Wheezing., Disp: , Rfl:     anastrozole (ARIMIDEX) 1 MG tablet, Take 1 tablet by mouth Daily., Disp: 90 tablet, Rfl: 3    aspirin 81 MG EC tablet, Take 1 tablet by mouth Daily., Disp: , Rfl:     Breztri Aerosphere 160-9-4.8 MCG/ACT aerosol inhaler, 2 puffs., Disp: , Rfl:     budesonide-formoterol (SYMBICORT) 160-4.5 MCG/ACT inhaler, Inhale 2 puffs 2 (two) times a day., Disp: , Rfl:     fexofenadine-pseudoephedrine (ALLEGRA-D 24) 180-240 MG per 24 hr tablet, Take 1 tablet by mouth As Needed for Allergies., Disp: , Rfl:     fluticasone (FLONASE) 50 MCG/ACT nasal spray, 2 sprays into the nostril(s) as directed by provider As Needed for Rhinitis., Disp: , Rfl:     glipiZIDE (GLUCOTROL) 5 MG ER tablet, Take 1 tablet by mouth Daily., Disp: , Rfl:     metFORMIN (GLUCOPHAGE) 500 MG tablet, Take 1 tablet by mouth every night at bedtime., Disp: , Rfl:     montelukast (SINGULAIR) 10 MG tablet, Take 1 tablet by mouth Every Night., Disp: , Rfl:     nortriptyline (PAMELOR) 10 MG capsule, Take 1 capsule by mouth At Night As Needed (Diarrhea)., Disp: 90 capsule, Rfl: 3    simvastatin (ZOCOR)  "10 MG tablet, Take 1 tablet by mouth Every Night., Disp: , Rfl:    SOCIAL Hx:  [unfilled]    The following portions of the patient's history were reviewed and updated as appropriate: allergies, current medications, past family history, past medical history, past social history, past surgical history and problem list.    Review of Systems      Urinary incontinence assessment discussed      Except as outlined in history of physical illness, patient denies any changes in her GYN, , GI systems.  All other systems reviewed are negative         Objective   Physical Exam    /80   Ht 157.5 cm (62\")   Wt 75.3 kg (166 lb)   BMI 30.36 kg/m²     General: Patient is alert and oriented and appears overall healthy  Neck: Is supple without thyromegaly, no carotid bruits and no lymphadenopathy  Lungs: Clear bilaterally, no wheezing, rhonchi, or rales.  Respiratory rate is normal  Breast: Even symmetrical, no lymphadenopathy, no retraction, no discharge ,no masses , lumps appreciated on either side  Heart: Regular rate and rhythm are appreciated, no murmurs or rubs are heard  Abdomen: Is soft, without organomegaly, bowel sounds are positive, there is no rebound or guarding and palpation does not produce any discomfort  Back: Nontender without CVA tenderness  Pelvic: External genitalia appear normal and consistent with mature female.  BUS normal                Urethra appears normal and without mass, bladder is nontender and without any lesions                        Urethral meatus is normal without scarring tenderness or masses                 Bladder is without tenderness or fullness                           Vagina is clean dry without discharge and , no lesions or masses are present                         Cervix is noninflamed without discharge or lesions.  There is no cervical motion tenderness.                Uterus is nonenlarged, without tenderness, and no masses or abnormalities are  present               Adnexa " are non-enlarged, non tender               Rectal digital  exam reveals adequate sphincter tone and no masses or lesions are appreciated on digital rectal examination.       Patient Active Problem List   Diagnosis    Encounter for screening for malignant neoplasm of colon    Chronic diarrhea    Irritable bowel syndrome with diarrhea    Chronic cough    Ductal carcinoma in situ (DCIS) of breast                Assessment & Plan   Diagnoses and all orders for this visit:    1. Ductal carcinoma in situ (DCIS) of left breast (Primary)    2. Atrophic vaginitis    3. Breast cancer screening by mammogram      Discussed today's findings and concerns with patient.  Continue to recommend regular exercise including cardiovascular and resistance training as well as  breast self-exam. Wellness lab, mammography, & pap smear, in accordance with age guidelines.    I have encouraged her to call for today's test results if she has not received them within 10 days.  Patient is advised to call with any change in her condition or with any other questions, otherwise return  for annual examination.

## 2024-01-18 ENCOUNTER — TELEPHONE (OUTPATIENT)
Dept: GASTROENTEROLOGY | Facility: CLINIC | Age: 79
End: 2024-01-18
Payer: MEDICARE

## 2024-01-18 NOTE — TELEPHONE ENCOUNTER
LAST C/S 2/27/19 IN EPIC     PERSONAL HX OF POLYPS     FAMILY HX OF POLYPS    NO FAMILY HX OF COLON CA    NO ASA OR BLOOD THINNERS        LIST OF  MEDICATIONS    ANASTROZOLE  GLIPIZIDE  MONTELUKAST  SIMVASTATIN  METFORMIN  NORTRIPTYLINE  SYMBICORT  KANG ASENCIO QUESTIONNAIRE SCANNED IN MEDIA

## 2024-01-31 ENCOUNTER — PREP FOR SURGERY (OUTPATIENT)
Dept: OTHER | Facility: HOSPITAL | Age: 79
End: 2024-01-31
Payer: MEDICARE

## 2024-01-31 DIAGNOSIS — Z86.010 HISTORY OF ADENOMATOUS POLYP OF COLON: Primary | ICD-10-CM

## 2024-02-01 ENCOUNTER — TELEPHONE (OUTPATIENT)
Dept: GASTROENTEROLOGY | Facility: CLINIC | Age: 79
End: 2024-02-01
Payer: MEDICARE

## 2024-02-01 PROBLEM — Z86.0101 HISTORY OF ADENOMATOUS POLYP OF COLON: Status: ACTIVE | Noted: 2024-01-31

## 2024-02-01 PROBLEM — Z86.010 HISTORY OF ADENOMATOUS POLYP OF COLON: Status: ACTIVE | Noted: 2024-01-31

## 2024-02-01 NOTE — TELEPHONE ENCOUNTER
ARNOLDO CERVANTES IN SCHEDULING PT SCHEDULED 05/08/2024 ARRIVING 1:10PM,[facility] PREP INSTRUCTIONS MAILED TO ADDRESS ON FILE VERIFIED BY THE PT .OK FOR THE HUB TO READ

## 2024-03-18 RX ORDER — ANASTROZOLE 1 MG/1
1 TABLET ORAL DAILY
Qty: 90 TABLET | Refills: 0 | Status: SHIPPED | OUTPATIENT
Start: 2024-03-18

## 2024-03-28 NOTE — PROGRESS NOTES
REASON FOR FOLLOW-UP: DCIS of left breast.      History of Present Illness   The patient 78-year-old female followed by primary care with the below medical disorders including hypertension, hyperlipidemia, diabetes mellitus and GE reflux as well as rotator cuff tear 3/18/2022 status post steroid injection therapy.  She also underwent EGD 6/8/2022 with normal findings, antral gastritis noted and several small diminutive gastric polyps.    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  ER 99%, IL 99%.    The patient was seen by Dr. Juarez of general surgery in then proceeded with [employer] 9 panel genetic testing, subsequent MRI 7/2/2022 demonstrating her biopsy-proven site of DCIS up to 1.3 cm, nonspecific enhancement surrounding the cavity and no other findings that might suggest further malignancy including the right breast.    The patient 8/18/2022 underwent a left breast localized lumpectomy with pathology revealing left breast now without additional DCIS or LCIS or ALH   determined- PTisNxMx.    The patient was seen 9/12/2022 for radiation therapy consultation with a [place] vacation planned in approximately 3 weeks and the patient to return just after her trip in mid October 2022 with plans to proceed with [phone] in 15 fractions.    She is now seen in office 9/14/2022 for consideration of adjuvant therapy.    Additional historical point includes uterine abnormalities noted 7/16/2021 on ultrasound pelvis and transvaginal ultrasound leading to endometrial biopsy that was negative.    Patient is next seen  10/28/2022 with bone density having been performed 10/17 demonstrating lumbar T score of 2.9, left hip T score of 1.0 and right hip T score of 0.5-normal bone density.    She is feeling well though has reviewed the possibility of radiation therapy with she and her  decided not to proceed with it.  As result we discussed moving to endocrine therapy choosing between that which would not confuse the findings of her endometrial thickening.  As result of her normal bone density this will be an aromatase inhibitor such as Arimidex.  At length she and her  are willing to proceed with a trial of this at least over the next month.  This therapy, however, will be offered over 5-year length.    The patient seen back in follow-up 12/8/2022.  She is tolerating Arimidex without complication though is having further issues with her right hand and trigger finger that has been present for many years.    The patient was referred to hand surgery and underwent injection 12/30/2022.  This was successful and her hand pain and immobility has improved.  We have, additionally, discussed Dupuytren contracture of which the findings physically are suggestive.  Otherwise she is not having any issues tolerating Arimidex and we plan to continue same.    The patient is next seen 9/15/2023 doing reasonably well without any additional issues.  She was able to have her Dupuytren contraction treated successfully and is not having joint discomfort from the use of AI therapy.    She returns 3/29/24 for follow up continuing on Arimidex daily.  She has upcoming mammogram in April scheduled and will be due for repeat DEXA scan in the fall.  She tolerates the arimidex without noted difficulty.     Past Medical History:   Diagnosis Date    Arthritis     Asthma     Breast cancer     Breast cancer, left     Chronic cough     Diabetes mellitus     Fibrocystic breast     GERD (gastroesophageal reflux disease)     Goiter     INWARD GOITER    Hearing  loss     left    History of colon polyps     History of skin cancer     Hyperlipidemia     Hypertension     OFF MEDS X  4-5 YEARS    Lung nodule     FOLLOWED BY DR RAMÍREZ - HERNAN WITH REGULAR CT SCANS    Overactive bladder     Seasonal allergies     Vertigo         Past Surgical History:   Procedure Laterality Date    BREAST CYST EXCISION Left 2002    BREAST LUMPECTOMY Left 8/18/2022    Procedure: left breast wire localized lumpectomy;  Surgeon: Sarah Juarez MD;  Location:  [facility];  Service: General;  Laterality: Left;    CATARACT EXTRACTION EXTRACAPSULAR W/ INTRAOCULAR LENS IMPLANTATION      COLONOSCOPY N/A 03/06/2017    TA polyps, IH    COLONOSCOPY N/A 02/27/2019    Procedure: COLONOSCOPY TO CECUM WITH COLD BIOPSIES;  Surgeon: Marquez Alan MD;  [facility];  Service: Gastroenterology    D & C HYSTEROSCOPY N/A 10/14/2020    Procedure: DILATATION AND CURETTAGE HYSTEROSCOPY WITH MYOSURE;  Surgeon: Mariano Calix MD;  Location:  [facility];  Service: Obstetrics/Gynecology;  Laterality: N/A;    DENTAL PROCEDURE      DILATATION AND CURETTAGE      MULTIPLE D/T MISCARRIAGES    ENDOSCOPY N/A 06/08/2022    Procedure: ESOPHAGOGASTRODUODENOSCOPY with biopsy;  Surgeon: Marquez Alan MD;  Location: [facility] ENDOSCOPY;  Service: Gastroenterology;  Laterality: N/A;  gastritis    EYE SURGERY  3/4/2015    ORIF ANKLE FRACTURE Left     WITH INSTRUMENTATION    SKIN CANCER EXCISION Right     LEG        Current Outpatient Medications on File Prior to Visit   Medication Sig Dispense Refill    albuterol sulfate  (90 Base) MCG/ACT inhaler Inhale 2 puffs Every 4 (Four) Hours As Needed for Wheezing.      anastrozole (ARIMIDEX) 1 MG tablet TAKE ONE TABLET BY MOUTH DAILY 90 tablet 0    aspirin 81 MG EC tablet Take 1 tablet by mouth Daily.      Breztri Aerosphere 160-9-4.8 MCG/ACT aerosol inhaler 2 puffs.      budesonide-formoterol (SYMBICORT) 160-4.5 MCG/ACT inhaler Inhale 2 puffs 2 (two) times  a day.      fexofenadine-pseudoephedrine (ALLEGRA-D 24) 180-240 MG per 24 hr tablet Take 1 tablet by mouth As Needed for Allergies.      fluticasone (FLONASE) 50 MCG/ACT nasal spray 2 sprays into the nostril(s) as directed by provider As Needed for Rhinitis.      glipiZIDE (GLUCOTROL) 5 MG ER tablet Take 1 tablet by mouth Daily.      metFORMIN (GLUCOPHAGE) 500 MG tablet Take 1 tablet by mouth every night at bedtime.      montelukast (SINGULAIR) 10 MG tablet Take 1 tablet by mouth Every Night.      nortriptyline (PAMELOR) 10 MG capsule Take 1 capsule by mouth At Night As Needed (Diarrhea). 90 capsule 3    simvastatin (ZOCOR) 10 MG tablet Take 1 tablet by mouth Every Night.       No current facility-administered medications on file prior to visit.        ALLERGIES:    Allergies   Allergen Reactions    Sulfa Antibiotics Other (See Comments)     CHILDHOOD REACTION         Social History     Socioeconomic History    Marital status:      Spouse name: Kory   Tobacco Use    Smoking status: Never    Smokeless tobacco: Never   Vaping Use    Vaping status: Never Used   Substance and Sexual Activity    Alcohol use: Yes     Comment: RARELY    Drug use: Never    Sexual activity: Yes     Partners: Male     Birth control/protection: Post-menopausal, None        Family History   Problem Relation Age of Onset    GI problems Mother         ileostomy    Dementia Mother     Pancreatic cancer Father     Arthritis Father     Diabetes Father     Malig Hyperthermia Neg Hx     Breast cancer Neg Hx     Ovarian cancer Neg Hx         Review of Systems   Constitutional: Negative.    HENT: Negative.     Eyes: Negative.    Respiratory: Negative.     Cardiovascular: Negative.    Gastrointestinal: Negative.    Endocrine: Negative.    Genitourinary: Negative.    Musculoskeletal: Negative.    Skin: Negative.    Allergic/Immunologic: Negative.    Neurological: Negative.    Hematological: Negative.    Psychiatric/Behavioral: Negative.       "    Objective     Vitals:    03/29/24 1050   BP: 156/86   Pulse: 85   Resp: 18   Temp: 97.1 °F (36.2 °C)   TempSrc: Temporal   SpO2: 97%   Weight: 77.1 kg (170 lb)   Height: 157.5 cm (62.01\")   PainSc: 0-No pain           3/29/2024    10:46 AM   Current Status   ECOG score 0       Physical Exam  Constitutional:       Appearance: Normal appearance.   HENT:      Head: Normocephalic and atraumatic.      Nose: Nose normal.   Eyes:      Extraocular Movements: Extraocular movements intact.      Conjunctiva/sclera: Conjunctivae normal.      Pupils: Pupils are equal, round, and reactive to light.   Cardiovascular:      Rate and Rhythm: Normal rate and regular rhythm.      Pulses: Normal pulses.      Heart sounds: Normal heart sounds.   Pulmonary:      Effort: Pulmonary effort is normal.      Breath sounds: Normal breath sounds.      Comments: Status post left breast lumpectomy, no nodules noted, no skin changes. no additional axillary adenopathy.  Abdominal:      General: Bowel sounds are normal.      Palpations: Abdomen is soft.   Musculoskeletal:         General: Deformity (Mild contracture felt just proximal to right fourth finger anteriorly) present. Normal range of motion.      Cervical back: Normal range of motion and neck supple.      Right lower leg: No edema.      Left lower leg: No edema.   Skin:     General: Skin is warm and dry.   Neurological:      General: No focal deficit present.      Mental Status: She is alert and oriented to person, place, and time.   Psychiatric:         Mood and Affect: Mood normal.         Behavior: Behavior normal.           RECENT LABS:  Hematology WBC   Date Value Ref Range Status   03/29/2024 5.96 3.40 - 10.80 10*3/mm3 Final     RBC   Date Value Ref Range Status   03/29/2024 4.61 3.77 - 5.28 10*6/mm3 Final     Hemoglobin   Date Value Ref Range Status   03/29/2024 12.9 12.0 - 15.9 g/dL Final     Hematocrit   Date Value Ref Range Status   03/29/2024 39.4 34.0 - 46.6 % Final "     Platelets   Date Value Ref Range Status   03/29/2024 217 140 - 450 10*3/mm3 Final          Assessment & Plan        78-year-old female with history of hypertension, hyperlipidemia, diabetes, GE reflux, rotator cuff injury as well as additional history of recent EGD with benign findings and the previous endometrial hyperplasia (please see record).      The patient underwent screening mammography/12/22 with microcalcifications seen in the anterior one third of the retroareolar region left breast and stereotactic biopsy 6/13 showed DCIS of high nuclear grade strongly ER/CO positive.  She was seen by general surgery-Dr. Juarez-with MRI demonstrating the biopsy-proven site of DCIS and no other findings that might suggest malignancy in the left breast or right breast.  She underwent lumpectomy 8/18/2022 without residual tumor determined-stage - PTisNxMx.       The patient been seen by radiation therapy and is considering as to whether she will undergo this therapy.        We are asked to see her for adjuvant therapy concerns and have discussed endocrine therapy (tamoxifen versus AI therapy) that could reduce considerably her risk of invasive or noninvasive breast cancer.       After additional discussion particularly centering on her previous history including her endometrial hyperplasia it would seem prudent to have her undergo a bone density to determine her bone density status before we make a decision about adjuvant endocrine therapy of which she is considering as a total treatment rather than additional radiation therapy at this point.    Patient is next seen 10/28/2022 with bone density having been performed 10/17 demonstrating lumbar T score of 2.9, left hip T score of 1.0 and right hip T score of 0.5-normal bone density.    The patient and her  are advised of the normal bone density findings and have also decided, after reviewing it between themselves, not to proceed with radiation therapy.  This leads  to conversation about the use of aromatase number therapy as primary treatment since tamoxifen may actually produce further endometrial thickening and continues her subsequent assessments of this issue.    The patient is next seen 12/8/2022 tolerating Arimidex well without substantial side effects.  We will plan to continue for an additional 3-month trial.    The patient was referred to hand surgery and underwent injection 12/30/2022.  This was successful and her hand pain and immobility has improved.  We have, additionally, discussed Dupuytren contracture of which the findings physically are suggestive.  Otherwise she is not having any issues tolerating Arimidex and we plan to continue same.    The patient is next seen 9/15/2023 doing reasonably well without any additional issues.  She was able to have her Dupuytren contraction treated successfully and is not having joint discomfort from the use of AI therapy.    Patient returns 3/29/2024 in follow-up continuing on Arimidex daily.  He continues to tolerate this well.  She has been scheduled for her upcoming mammogram by her GYN in April.  She will be due for repeat DEXA scan in the fall.        Plan:  *Continue Arimidex daily  *DEXA scan due in October, ordered today  *Mammogram is scheduled in April of this year  *Follow-up in 6 months with Dr. Rasmussen with CBC and CMP    *The patient and  are agreeable this plan and follow-up

## 2024-03-29 ENCOUNTER — OFFICE VISIT (OUTPATIENT)
Dept: ONCOLOGY | Facility: CLINIC | Age: 79
End: 2024-03-29
Payer: MEDICARE

## 2024-03-29 ENCOUNTER — LAB (OUTPATIENT)
Dept: LAB | Facility: HOSPITAL | Age: 79
End: 2024-03-29
Payer: MEDICARE

## 2024-03-29 VITALS
BODY MASS INDEX: 31.28 KG/M2 | RESPIRATION RATE: 18 BRPM | HEART RATE: 85 BPM | DIASTOLIC BLOOD PRESSURE: 86 MMHG | SYSTOLIC BLOOD PRESSURE: 156 MMHG | HEIGHT: 62 IN | WEIGHT: 170 LBS | TEMPERATURE: 97.1 F | OXYGEN SATURATION: 97 %

## 2024-03-29 DIAGNOSIS — D05.12 DUCTAL CARCINOMA IN SITU (DCIS) OF LEFT BREAST: ICD-10-CM

## 2024-03-29 DIAGNOSIS — Z79.811 AROMATASE INHIBITOR USE: ICD-10-CM

## 2024-03-29 DIAGNOSIS — Z79.818 LONG TERM (CURRENT) USE OF OTHER AGENTS AFFECTING ESTROGEN RECEPTORS AND ESTROGEN LEVELS: ICD-10-CM

## 2024-03-29 DIAGNOSIS — Z00.00 PERIODIC HEALTH ASSESSMENT, GENERAL SCREENING, ADULT: Primary | ICD-10-CM

## 2024-03-29 LAB
ALBUMIN SERPL-MCNC: 4.1 G/DL (ref 3.5–5.2)
ALBUMIN/GLOB SERPL: 1.4 G/DL
ALP SERPL-CCNC: 95 U/L (ref 39–117)
ALT SERPL W P-5'-P-CCNC: 16 U/L (ref 1–33)
ANION GAP SERPL CALCULATED.3IONS-SCNC: 11.9 MMOL/L (ref 5–15)
AST SERPL-CCNC: 20 U/L (ref 1–32)
BASOPHILS # BLD AUTO: 0.03 10*3/MM3 (ref 0–0.2)
BASOPHILS NFR BLD AUTO: 0.5 % (ref 0–1.5)
BILIRUB SERPL-MCNC: 0.4 MG/DL (ref 0–1.2)
BUN SERPL-MCNC: 10 MG/DL (ref 8–23)
BUN/CREAT SERPL: 14.5 (ref 7–25)
CALCIUM SPEC-SCNC: 9.4 MG/DL (ref 8.6–10.5)
CHLORIDE SERPL-SCNC: 105 MMOL/L (ref 98–107)
CO2 SERPL-SCNC: 22.1 MMOL/L (ref 22–29)
CREAT SERPL-MCNC: 0.69 MG/DL (ref 0.57–1)
DEPRECATED RDW RBC AUTO: 41.6 FL (ref 37–54)
EGFRCR SERPLBLD CKD-EPI 2021: 89 ML/MIN/1.73
EOSINOPHIL # BLD AUTO: 0.06 10*3/MM3 (ref 0–0.4)
EOSINOPHIL NFR BLD AUTO: 1 % (ref 0.3–6.2)
ERYTHROCYTE [DISTWIDTH] IN BLOOD BY AUTOMATED COUNT: 13.3 % (ref 12.3–15.4)
GLOBULIN UR ELPH-MCNC: 2.9 GM/DL
GLUCOSE SERPL-MCNC: 223 MG/DL (ref 65–99)
HCT VFR BLD AUTO: 39.4 % (ref 34–46.6)
HGB BLD-MCNC: 12.9 G/DL (ref 12–15.9)
IMM GRANULOCYTES # BLD AUTO: 0.02 10*3/MM3 (ref 0–0.05)
IMM GRANULOCYTES NFR BLD AUTO: 0.3 % (ref 0–0.5)
LYMPHOCYTES # BLD AUTO: 1.8 10*3/MM3 (ref 0.7–3.1)
LYMPHOCYTES NFR BLD AUTO: 30.2 % (ref 19.6–45.3)
MCH RBC QN AUTO: 28 PG (ref 26.6–33)
MCHC RBC AUTO-ENTMCNC: 32.7 G/DL (ref 31.5–35.7)
MCV RBC AUTO: 85.5 FL (ref 79–97)
MONOCYTES # BLD AUTO: 0.51 10*3/MM3 (ref 0.1–0.9)
MONOCYTES NFR BLD AUTO: 8.6 % (ref 5–12)
NEUTROPHILS NFR BLD AUTO: 3.54 10*3/MM3 (ref 1.7–7)
NEUTROPHILS NFR BLD AUTO: 59.4 % (ref 42.7–76)
NRBC BLD AUTO-RTO: 0 /100 WBC (ref 0–0.2)
PLATELET # BLD AUTO: 217 10*3/MM3 (ref 140–450)
PMV BLD AUTO: 8.7 FL (ref 6–12)
POTASSIUM SERPL-SCNC: 4.4 MMOL/L (ref 3.5–5.2)
PROT SERPL-MCNC: 7 G/DL (ref 6–8.5)
RBC # BLD AUTO: 4.61 10*6/MM3 (ref 3.77–5.28)
SODIUM SERPL-SCNC: 139 MMOL/L (ref 136–145)
WBC NRBC COR # BLD AUTO: 5.96 10*3/MM3 (ref 3.4–10.8)

## 2024-03-29 PROCEDURE — 80053 COMPREHEN METABOLIC PANEL: CPT

## 2024-03-29 PROCEDURE — 85025 COMPLETE CBC W/AUTO DIFF WBC: CPT

## 2024-03-29 PROCEDURE — 36415 COLL VENOUS BLD VENIPUNCTURE: CPT

## 2024-04-25 ENCOUNTER — HOSPITAL ENCOUNTER (OUTPATIENT)
Dept: MAMMOGRAPHY | Facility: HOSPITAL | Age: 79
Discharge: HOME | End: 2024-04-25
Admitting: OBSTETRICS & GYNECOLOGY
Payer: MEDICARE

## 2024-04-25 DIAGNOSIS — Z12.31 BREAST CANCER SCREENING BY MAMMOGRAM: ICD-10-CM

## 2024-04-25 DIAGNOSIS — D05.12 DUCTAL CARCINOMA IN SITU (DCIS) OF LEFT BREAST: ICD-10-CM

## 2024-04-25 DIAGNOSIS — N95.2 ATROPHIC VAGINITIS: ICD-10-CM

## 2024-04-25 PROCEDURE — 77067 SCR MAMMO BI INCL CAD: CPT

## 2024-04-25 PROCEDURE — 77063 BREAST TOMOSYNTHESIS BI: CPT

## 2024-05-03 ENCOUNTER — TRANSCRIBE ORDERS (OUTPATIENT)
Dept: ADMINISTRATIVE | Facility: HOSPITAL | Age: 79
End: 2024-05-03
Payer: MEDICARE

## 2024-05-03 ENCOUNTER — LAB (OUTPATIENT)
Dept: LAB | Facility: HOSPITAL | Age: 79
End: 2024-05-03
Payer: MEDICARE

## 2024-05-03 DIAGNOSIS — E03.9 MYXEDEMA HEART DISEASE: ICD-10-CM

## 2024-05-03 DIAGNOSIS — E11.9 DIABETES MELLITUS WITHOUT COMPLICATION: ICD-10-CM

## 2024-05-03 DIAGNOSIS — I51.9 MYXEDEMA HEART DISEASE: ICD-10-CM

## 2024-05-03 DIAGNOSIS — E78.5 HYPERLIPIDEMIA, UNSPECIFIED HYPERLIPIDEMIA TYPE: Primary | ICD-10-CM

## 2024-05-03 DIAGNOSIS — E78.5 HYPERLIPIDEMIA, UNSPECIFIED HYPERLIPIDEMIA TYPE: ICD-10-CM

## 2024-05-03 LAB
ALBUMIN SERPL-MCNC: 4.1 G/DL (ref 3.5–5.2)
ALBUMIN/GLOB SERPL: 1.5 G/DL
ALP SERPL-CCNC: 95 U/L (ref 39–117)
ALT SERPL W P-5'-P-CCNC: 16 U/L (ref 1–33)
ANION GAP SERPL CALCULATED.3IONS-SCNC: 10.8 MMOL/L (ref 5–15)
AST SERPL-CCNC: 16 U/L (ref 1–32)
BILIRUB SERPL-MCNC: 0.4 MG/DL (ref 0–1.2)
BUN SERPL-MCNC: 11 MG/DL (ref 8–23)
BUN/CREAT SERPL: 16.4 (ref 7–25)
CALCIUM SPEC-SCNC: 9.3 MG/DL (ref 8.6–10.5)
CHLORIDE SERPL-SCNC: 105 MMOL/L (ref 98–107)
CHOLEST SERPL-MCNC: 167 MG/DL (ref 0–200)
CO2 SERPL-SCNC: 24.2 MMOL/L (ref 22–29)
CREAT SERPL-MCNC: 0.67 MG/DL (ref 0.57–1)
EGFRCR SERPLBLD CKD-EPI 2021: 89.6 ML/MIN/1.73
GLOBULIN UR ELPH-MCNC: 2.7 GM/DL
GLUCOSE SERPL-MCNC: 157 MG/DL (ref 65–99)
HBA1C MFR BLD: 7.4 % (ref 4.8–5.6)
HDLC SERPL-MCNC: 55 MG/DL (ref 40–60)
LDLC SERPL CALC-MCNC: 90 MG/DL (ref 0–100)
LDLC/HDLC SERPL: 1.59 {RATIO}
POTASSIUM SERPL-SCNC: 4 MMOL/L (ref 3.5–5.2)
PROT SERPL-MCNC: 6.8 G/DL (ref 6–8.5)
SODIUM SERPL-SCNC: 140 MMOL/L (ref 136–145)
T4 FREE SERPL-MCNC: 1.31 NG/DL (ref 0.93–1.7)
TRIGL SERPL-MCNC: 122 MG/DL (ref 0–150)
TSH SERPL DL<=0.05 MIU/L-ACNC: 1.46 UIU/ML (ref 0.27–4.2)
VLDLC SERPL-MCNC: 22 MG/DL (ref 5–40)

## 2024-05-03 PROCEDURE — 36415 COLL VENOUS BLD VENIPUNCTURE: CPT

## 2024-05-03 PROCEDURE — 84443 ASSAY THYROID STIM HORMONE: CPT

## 2024-05-03 PROCEDURE — 80053 COMPREHEN METABOLIC PANEL: CPT

## 2024-05-03 PROCEDURE — 83036 HEMOGLOBIN GLYCOSYLATED A1C: CPT

## 2024-05-03 PROCEDURE — 84439 ASSAY OF FREE THYROXINE: CPT

## 2024-05-03 PROCEDURE — 80061 LIPID PANEL: CPT

## 2024-05-08 ENCOUNTER — HOSPITAL ENCOUNTER (OUTPATIENT)
Facility: HOSPITAL | Age: 79
Setting detail: HOSPITAL OUTPATIENT SURGERY
Discharge: HOME | End: 2024-05-08
Attending: INTERNAL MEDICINE | Admitting: INTERNAL MEDICINE
Payer: MEDICARE

## 2024-05-08 ENCOUNTER — ANESTHESIA (OUTPATIENT)
Dept: GASTROENTEROLOGY | Facility: HOSPITAL | Age: 79
End: 2024-05-08
Payer: MEDICARE

## 2024-05-08 ENCOUNTER — ANESTHESIA EVENT (OUTPATIENT)
Dept: GASTROENTEROLOGY | Facility: HOSPITAL | Age: 79
End: 2024-05-08
Payer: MEDICARE

## 2024-05-08 VITALS
DIASTOLIC BLOOD PRESSURE: 71 MMHG | RESPIRATION RATE: 20 BRPM | BODY MASS INDEX: 30.36 KG/M2 | SYSTOLIC BLOOD PRESSURE: 138 MMHG | HEART RATE: 69 BPM | HEIGHT: 62 IN | WEIGHT: 165 LBS | OXYGEN SATURATION: 99 %

## 2024-05-08 DIAGNOSIS — Z86.010 HISTORY OF ADENOMATOUS POLYP OF COLON: ICD-10-CM

## 2024-05-08 LAB — GLUCOSE BLDC GLUCOMTR-MCNC: 121 MG/DL (ref 70–130)

## 2024-05-08 PROCEDURE — S0260 H&P FOR SURGERY: HCPCS | Performed by: INTERNAL MEDICINE

## 2024-05-08 PROCEDURE — 25010000002 PROPOFOL 1000 MG/100ML EMULSION: Performed by: NURSE ANESTHETIST, CERTIFIED REGISTERED

## 2024-05-08 PROCEDURE — 88305 TISSUE EXAM BY PATHOLOGIST: CPT | Performed by: INTERNAL MEDICINE

## 2024-05-08 PROCEDURE — 45381 COLONOSCOPY SUBMUCOUS NJX: CPT | Performed by: INTERNAL MEDICINE

## 2024-05-08 PROCEDURE — 25810000003 LACTATED RINGERS PER 1000 ML: Performed by: INTERNAL MEDICINE

## 2024-05-08 PROCEDURE — 45385 COLONOSCOPY W/LESION REMOVAL: CPT | Performed by: INTERNAL MEDICINE

## 2024-05-08 PROCEDURE — 82948 REAGENT STRIP/BLOOD GLUCOSE: CPT

## 2024-05-08 RX ORDER — SODIUM CHLORIDE, SODIUM LACTATE, POTASSIUM CHLORIDE, CALCIUM CHLORIDE 600; 310; 30; 20 MG/100ML; MG/100ML; MG/100ML; MG/100ML
30 INJECTION, SOLUTION INTRAVENOUS CONTINUOUS PRN
Status: DISCONTINUED | OUTPATIENT
Start: 2024-05-08 | End: 2024-05-08 | Stop reason: HOSPADM

## 2024-05-08 RX ORDER — PROPOFOL 10 MG/ML
INJECTION, EMULSION INTRAVENOUS CONTINUOUS PRN
Status: DISCONTINUED | OUTPATIENT
Start: 2024-05-08 | End: 2024-05-08 | Stop reason: SURG

## 2024-05-08 RX ORDER — LIDOCAINE HYDROCHLORIDE 20 MG/ML
INJECTION, SOLUTION INFILTRATION; PERINEURAL AS NEEDED
Status: DISCONTINUED | OUTPATIENT
Start: 2024-05-08 | End: 2024-05-08 | Stop reason: SURG

## 2024-05-08 RX ADMIN — SODIUM CHLORIDE, POTASSIUM CHLORIDE, SODIUM LACTATE AND CALCIUM CHLORIDE 30 ML/HR: 600; 310; 30; 20 INJECTION, SOLUTION INTRAVENOUS at 13:27

## 2024-05-08 RX ADMIN — PROPOFOL INJECTABLE EMULSION 50 MG: 10 INJECTION, EMULSION INTRAVENOUS at 14:15

## 2024-05-08 RX ADMIN — PROPOFOL INJECTABLE EMULSION 160 MCG/KG/MIN: 10 INJECTION, EMULSION INTRAVENOUS at 14:14

## 2024-05-08 RX ADMIN — LIDOCAINE HYDROCHLORIDE 60 MG: 20 INJECTION, SOLUTION INFILTRATION; PERINEURAL at 14:15

## 2024-05-08 NOTE — H&P
[facility]  Pre Procedure History & Physical    Chief Complaint:   History of colon polyps    Subjective     HPI:   Patient 79-year-old female with history of hypertension, hyperlipidemia, diabetes with history of colon polyps here for colonoscopy.    Past Medical History:   Past Medical History:   Diagnosis Date    Arthritis     Asthma     Breast cancer     Breast cancer, left     Chronic cough     Diabetes mellitus     Fibrocystic breast     GERD (gastroesophageal reflux disease)     Goiter     INWARD GOITER    Hearing loss     left    History of colon polyps     History of skin cancer     Hyperlipidemia     Hypertension     OFF MEDS X  4-5 YEARS    Lung nodule     FOLLOWED BY DR RAMÍREZ - HERNAN WITH REGULAR CT SCANS    Overactive bladder     Seasonal allergies     Vertigo        Past Surgical History:  Past Surgical History:   Procedure Laterality Date    BREAST CYST EXCISION Left 2002    BREAST LUMPECTOMY Left 8/18/2022    Procedure: left breast wire localized lumpectomy;  Surgeon: Sarah Juarez MD;  Location: [facility];  Service: General;  Laterality: Left;    CATARACT EXTRACTION EXTRACAPSULAR W/ INTRAOCULAR LENS IMPLANTATION      COLONOSCOPY N/A 03/06/2017    TA polyps, IH    COLONOSCOPY N/A 02/27/2019    Procedure: COLONOSCOPY TO CECUM WITH COLD BIOPSIES;  Surgeon: Marquez Alan MD;  Location: [facility] ENDOSCOPY;  Service: Gastroenterology    D & C HYSTEROSCOPY N/A 10/14/2020    Procedure: DILATATION AND CURETTAGE HYSTEROSCOPY WITH MYOSURE;  Surgeon: Mariano Calix MD;  Location: [facility] [place];  Service: Obstetrics/Gynecology;  Laterality: N/A;    DENTAL PROCEDURE      DILATATION AND CURETTAGE      MULTIPLE D/T MISCARRIAGES    ENDOSCOPY N/A 06/08/2022    Procedure: ESOPHAGOGASTRODUODENOSCOPY with biopsy;  Surgeon: Marquez Alan MD;  Location: [facility] ENDOSCOPY;  Service: Gastroenterology;  Laterality: N/A;  gastritis    EYE SURGERY  3/4/2015    ORIF ANKLE FRACTURE  Left     WITH INSTRUMENTATION    SKIN CANCER EXCISION Right     LEG       Family History:  Family History   Problem Relation Age of Onset    GI problems Mother         ileostomy    Dementia Mother     Pancreatic cancer Father     Arthritis Father     Diabetes Father     Malig Hyperthermia Neg Hx     Breast cancer Neg Hx     Ovarian cancer Neg Hx        Social History:   reports that she has never smoked. She has never used smokeless tobacco. She reports current alcohol use. She reports that she does not use drugs.    Medications:   Medications Prior to Admission   Medication Sig Dispense Refill Last Dose    albuterol sulfate  (90 Base) MCG/ACT inhaler Inhale 2 puffs Every 4 (Four) Hours As Needed for Wheezing.   Past Week    anastrozole (ARIMIDEX) 1 MG tablet TAKE ONE TABLET BY MOUTH DAILY 90 tablet 0 Past Week    Breztri Aerosphere 160-9-4.8 MCG/ACT aerosol inhaler 2 puffs.   Past Week    budesonide-formoterol (SYMBICORT) 160-4.5 MCG/ACT inhaler Inhale 2 puffs 2 (two) times a day.   Past Week    fexofenadine-pseudoephedrine (ALLEGRA-D 24) 180-240 MG per 24 hr tablet Take 1 tablet by mouth As Needed for Allergies.   Past Month    fluticasone (FLONASE) 50 MCG/ACT nasal spray 2 sprays into the nostril(s) as directed by provider As Needed for Rhinitis.   Past Month    glipiZIDE (GLUCOTROL) 5 MG ER tablet Take 1 tablet by mouth Daily.   Past Week    metFORMIN (GLUCOPHAGE) 500 MG tablet Take 1 tablet by mouth every night at bedtime.   Past Week    montelukast (SINGULAIR) 10 MG tablet Take 1 tablet by mouth Every Night.   Past Week    simvastatin (ZOCOR) 10 MG tablet Take 1 tablet by mouth Every Night.   Past Week    aspirin 81 MG EC tablet Take 1 tablet by mouth Daily.   More than a month    nortriptyline (PAMELOR) 10 MG capsule Take 1 capsule by mouth At Night As Needed (Diarrhea). 90 capsule 3 More than a month       Allergies:  Sulfa antibiotics    ROS:    Pertinent items are noted in HPI     Objective  "    Blood pressure 147/93, pulse 101, resp. rate 12, height 157.5 cm (62\"), weight 74.8 kg (165 lb), SpO2 96%, not currently breastfeeding.    Physical Exam   Constitutional: Pt is oriented to person, place, and time and well-developed, well-nourished, and in no distress.   Mouth/Throat: Oropharynx is clear and moist.   Neck: Normal range of motion.   Cardiovascular: Normal rate, regular rhythm and normal heart sounds.    Pulmonary/Chest: Effort normal and breath sounds normal.   Abdominal: Soft. Nontender  Skin: Skin is warm and dry.   Psychiatric: Mood, memory, affect and judgment normal.     Assessment & Plan     Diagnosis:  History colon polyps    Anticipated Surgical Procedure:  Colonoscopy    The risks, benefits, and alternatives of this procedure have been discussed with the patient or the responsible party- the patient understands and agrees to proceed.                                                          "

## 2024-05-08 NOTE — BRIEF OP NOTE
COLONOSCOPY  Progress Note    Olinda Baumann  5/8/2024    Pre-op Diagnosis:   History of adenomatous polyp of colon [Z86.010]       Post-Op Diagnosis Codes:     * History of adenomatous polyp of colon [Z86.010]     * Colon polyps [K63.5]     * Internal hemorrhoids [K64.8]    Procedure/CPT® Codes:        Procedure(s):  COLONOSCOPY to cecum and TI with saline injection and hot snare / cold snare polypectomies              Surgeon(s):  Marquez Alan MD    Anesthesia: Monitored Anesthesia Care    Staff:   Endo Technician: Ayaz Carcamo; Bee Juarez; Siria Rizvi, PCT  Endo Nurse: Harshil Chan RN         Estimated Blood Loss: minimal    Urine Voided: * No values recorded between 5/8/2024  2:03 PM and 5/8/2024  2:41 PM *    Specimens:                Specimens       ID Source Type Tests Collected By Collected At Frozen?    A Large Intestine, Right / Ascending Colon Polyp TISSUE PATHOLOGY EXAM   Marquez Alan MD 5/8/24 [id number]     Description: ascending colon polyp    This specimen was not marked as sent.    B Large Intestine, Sigmoid Colon Polyp TISSUE PATHOLOGY EXAM   Marquez Alan MD 5/8/24 1433     Description: sigmoid polyps    This specimen was not marked as sent.                  Drains: * No LDAs found *    Findings: Colonoscopy to the terminal ileum with normal ileum mucosa.  Ascending colon polyp removed saline lift snare cautery.  Colon polyps x 2 in the sigmoid removed cold snare.  Internal hemorrhoids noted.        Complications: None          Marquez Alan MD     Date: 5/8/2024  Time: 14:41 EDT

## 2024-05-09 LAB
LAB AP CASE REPORT: NORMAL
PATH REPORT.FINAL DX SPEC: NORMAL
PATH REPORT.GROSS SPEC: NORMAL

## 2024-05-20 ENCOUNTER — OFFICE VISIT (OUTPATIENT)
Dept: SURGERY | Facility: CLINIC | Age: 79
End: 2024-05-20
Payer: MEDICARE

## 2024-05-20 VITALS
DIASTOLIC BLOOD PRESSURE: 86 MMHG | HEIGHT: 62 IN | WEIGHT: 168 LBS | BODY MASS INDEX: 30.91 KG/M2 | SYSTOLIC BLOOD PRESSURE: 138 MMHG

## 2024-05-20 DIAGNOSIS — Z09 ENCOUNTER FOR FOLLOW-UP: ICD-10-CM

## 2024-05-20 DIAGNOSIS — D05.12 DUCTAL CARCINOMA IN SITU (DCIS) OF LEFT BREAST: Primary | ICD-10-CM

## 2024-05-20 NOTE — PROGRESS NOTES
Assessment/Plan:  79 y.o. female with a history of left breast DCIS: Grade III, ER+/AK+; [id number], Stage 0.      A multidisciplinary plan has been formulated for the patient:    (1) Breast Surgical Oncology:  - Status post left breast wire localized lumpectomy 8/2022 with Dr. Juarez.  - Bilateral screening mammogram 04/2024 BIRADS 1. Annual screening mammogram due 4/2025.   - Follow up in one year.    (2) Medical Oncology:  - Following with Dr. Rasmussen.  - Currently on Arimidex.     (3) Radiation Oncology:  - Followed with Dr. Harley.  - Patient decided not to proceed with radiation.    (4) Health Maintenance:  - Colon cancer screening: Colonoscopy 5/8/2024 Dr. Alan. History of sessile serrated polyp.     Chief Complaint: routine followup breast cancer    History of Present Illness:   7/5/2022 Seen by Dr. Juarez:  Ms. Olinda Baumann is a 79 y.o. year old lady, seen at the request of No ref. provider found for a new diagnosis of left breast cancer.    This was initially detected as an imaging abnormality. She has had annual mammograms each year. She has no prior history of breast biopsy. Her work-up is detailed in the oncologic history below.   She denies any breast lumps, pain, skin changes, or nipple discharge. She denies any family history of breast or ovarian cancer.     9/12/2022 Seen by Dr. Juarez:  she presents today for follow-up.  She is doing very well.  Her pain is controlled.  She has no concerns with her incision.  She complains of a few small aches along the lateral aspect of her chest wall.  He is getting back to her daily activities.    5/10/2023 She is here today for a follow up. She has recently been having trouble with her asthma and allergies. She has been using her inhaler and recently started using a nebulizer to help as well. Otherwise, denies any complaints. Denies any new breast concerns.  Denies any breast lumps, pain, or skin changes. She recently had her mammogram done and it was  benign. She is tolerating her Arimidex.     5/20/2024 She presents today for a follow-up. Denies any new breast masses or skin changes.  She has occasional tenderness near her incision, states this is usually only with certain clothes.  She is continuing to tolerate her Arimidex.  She is up-to-date on her annual mammogram.    Workup of Current Diagnosis:    4/12/2022 Bilateral Screening Mammogram:  IMPRESSION:  1. There are multiple microcalcifications seen in the anterior one-third retroareolar region of the left breast. Further evaluation with spot magnification CC and 90 degree lateral images is recommended.  2. There is an area of focal asymmetry in the middle third lateral aspect of the right breast. Further evaluation with spot compression CC mammographic and tomosynthesis images and possible targeted right breast sonography is recommended.  BI-RADS Category 0: Incomplete - Needs additional imaging evaluation.    5/12/2022 Bilateral Diagnostic Mammogram:  In the right breast in the middle third lateral aspect of the previous described area of focal asymmetry resolves with additional imaging. This is consistent with normal breast parenchyma. No suspicious findings in the right breast are visualized.  In the left breast additional imaging demonstrates the presence of multiple microcalcifications in the cluster with a linear distribution spanning a distance of approximately 1.8 cm seen in the retroareolar region of the left breast at the 6 clock position. There is a question of mild pleomorphism.  IMPRESSION:  1. There is a 1.8 cm cluster of linear microcalcifications in the retroareolar region of the left breast at the 6 o'clock position. Correlation with a stereo-tactic guided left breast biopsy is recommended.  2. There are no findings suspicious for malignancy in the right breast.  BI-RADS Category 4: Suspicious abnormality. Biopsy should be considered.    6/13/2022 Left Breast Stereotactic Biopsy:  The  calcifications in the anterior left breast was/were localized and targeted under stereotactic/tomosynthesis guidance via a(n) medial approach. 4 core specimens were obtained. Specimen radiography demonstrated the targeted calcifications. A tribell clip was then deployed at the biopsy site. The patient tolerated the procedure well with no immediate complications.  Post-procedural digital mammographic views of the left breast demonstrate the tribell clip at the expected location at the site of biopsy in the lower central anterior left breast at the lateral margin of residual calcifications.  IMPRESSION:  1. Stereotactic/Tomosynthesis guided core needle biopsy of a 1.8 cm group of calcifications at 6:00 in the retroareolar left breast, marked with a tribell biopsy clip, where there are residual calcifications. Pathology is malignant and concordant with the imaging assessment. Surgical consultation is recommended. Contrast-enhanced breast MRI should be considered to evaluate extent of disease.  Pathology:  Final Diagnosis   1. Breast, Left, Retro, 6 O'clock, Biopsy: Ductal carcinoma in situ (DCIS).               A. The ductal carcinoma in situ is of high nuclear grade with comedo, solid and cribriform architecture and expansive necrosis.                 B. Microcalcifications are associated with the ductal carcinoma in situ.               C. DCIS measures up to 3 mm on the slide.     7/2/2022 Bilateral Breast MRI:  IMPRESSION:  1. Biopsy-proven site of DCIS in the left breast in the retroareolar region at the 6 o'clock position with the Tri-Bell shaped metallic clip located within a postbiopsy cavity that measures up to 1.3 cm in greatest dimension. Only nonspecific enhancement surrounding the cavity is visualized. No evidence for left axillary adenopathy is appreciated and no other suspicious findings are seen within the left breast. If breast conservation therapy is desired then surgical excision of any remaining  suspicious microcalcifications at the biopsy site is recommended.  2. There are no findings suspicious for malignancy in the right breast.   BI-RADS CATEGORY 6: Known biopsy-proven malignancy.    8/18/2022 Left breast wire localized lumpectomy:  Final Diagnosis   1. Left Breast, Needle Localization Lumpectomy:               A. Biopsy site changes are identified and metallic clip retrieved.               B. No ductal carcinoma in-situ nor invasive carcinoma identified.               C. No lobular neoplasia (LCIS, ALH) identified (see comment).               D. Scattered adenosis, sclerosing adenosis, fibroadenomatous hyperplasia, fibrocystic change, usual ductal hyperplasia and focal clustered ductal cysts identified.  E. Rare calcification present in benign breast tissue.  F. Surgical margins are viable and negative for malignancy.  G. Rare microcalcification noted within benign breast tissue.   2. Left Breast, Additional Superior Margin:                 A. No atypical hyperplasia, in-situ carcinoma nor invasive carcinoma identified.               B. New margin is negative for malignancy by additional 8 mm.  3. Left Breast, Additional Inferior Margin:                 A. No atypical hyperplasia, in-situ carcinoma nor invasive carcinoma identified.               B. New margin is negative for malignancy by additional 10 mm.  4. Left Breast, Additional Medial Margin:                 A. No atypical hyperplasia, in-situ carcinoma nor invasive carcinoma identified.               B. New margin is negative for malignancy by additional 7 mm.  5. Left Breast, Additional Lateral Margin:                 A. No atypical hyperplasia, in-situ carcinoma nor invasive carcinoma identified.               B. New margin is negative for malignancy by additional 6 mm.  6. Left Breast, Additional Anterior Margin:                 A. No atypical hyperplasia, in-situ carcinoma nor invasive carcinoma identified.               B. New margin is  negative for malignancy by additional 12 mm.  7. Left Breast, Additional Posterior Margin:                 A. No atypical hyperplasia, in-situ carcinoma nor invasive carcinoma identified.               B. New margin is negative for malignancy by additional 12 mm.     2023 Bilateral Screening Mammogram:  There is scattered fibroglandular density which is symmetric. There are no findings to suggest malignancy. The right breast shows no change from 2023. The patient has had left breast surgery for breast cancer and there are minimal postsurgical changes that appear benign.  CONCLUSION: Benign bilateral mammogram.   BI-RADS Category 2: Benign findings.    2024 Bilateral Screening Mammogram:  FINDINGS:   Scattered fibroglandular densities are seen throughout both breasts in a pattern which is unchanged. No new or dominant masses, areas of architectural distortion or skin thickening. There is no evidence for axillary lymphadenopathy or nipple retraction.  IMPRESSION:  1. There is no evidence for malignancy or significant change in either breast. Routine followup mammography is recommended.  BI-RADS category 1: Negative.    Gynecologic History:   . P:0 AB:6  Age at first childbirth: N/A  Lactation/How long: N/A  Age at menarche: 13  Age at menopause: 51  Total years of oral contraceptive use: 5-6 years previously  Total years of hormone replacement therapy: No    Past Medical History:   Past Medical History:   Diagnosis Date    Arthritis     Asthma     Breast cancer     Breast cancer, left     Chronic cough     Diabetes mellitus     Fibrocystic breast     GERD (gastroesophageal reflux disease)     Goiter     INWARD GOITER    Hearing loss     left    History of colon polyps     History of skin cancer     Hyperlipidemia     Hypertension     OFF MEDS X  4-5 YEARS    Lung nodule     FOLLOWED BY DR RAMÍREZ - WATCHING WITH REGULAR CT SCANS    Overactive bladder     Seasonal allergies     Vertigo      Past  Surgical History:    Past Surgical History:   Procedure Laterality Date    BREAST CYST EXCISION Left 2002    BREAST LUMPECTOMY Left 8/18/2022    Procedure: left breast wire localized lumpectomy;  Surgeon: Sarah Juarez MD;  Location: [facility] [place];  Service: General;  Laterality: Left;    CATARACT EXTRACTION EXTRACAPSULAR W/ INTRAOCULAR LENS IMPLANTATION      COLONOSCOPY N/A 03/06/2017    TA polyps, IH    COLONOSCOPY N/A 02/27/2019    Procedure: COLONOSCOPY TO CECUM WITH COLD BIOPSIES;  Surgeon: Marquez Alan MD;  Location: [facility] ENDOSCOPY;  Service: Gastroenterology    COLONOSCOPY N/A 5/8/2024    Procedure: COLONOSCOPY to cecum and TI with saline injection and hot snare / cold snare polypectomies;  Surgeon: Marquez Alan MD;  Location: [facility] ENDOSCOPY;  Service: Gastroenterology;  Laterality: N/A;  Pre - h/o colon polyps.  Post - polyps    D & C HYSTEROSCOPY N/A 10/14/2020    Procedure: DILATATION AND CURETTAGE HYSTEROSCOPY WITH MYOSURE;  Surgeon: Mariano Calix MD;  Location: [facility] [place];  Service: Obstetrics/Gynecology;  Laterality: N/A;    DENTAL PROCEDURE      DILATATION AND CURETTAGE      MULTIPLE D/T MISCARRIAGES    ENDOSCOPY N/A 06/08/2022    Procedure: ESOPHAGOGASTRODUODENOSCOPY with biopsy;  Surgeon: Marquez Alan MD;  Location: [facility] ENDOSCOPY;  Service: Gastroenterology;  Laterality: N/A;  gastritis    EYE SURGERY  3/4/2015    ORIF ANKLE FRACTURE Left     WITH INSTRUMENTATION    SKIN CANCER EXCISION Right     LEG     Family History:    Family History   Problem Relation Age of Onset    GI problems Mother         ileostomy    Dementia Mother     Pancreatic cancer Father     Arthritis Father     Diabetes Father     Malig Hyperthermia Neg Hx     Breast cancer Neg Hx     Ovarian cancer Neg Hx      Social History:  Social History     Socioeconomic History    Marital status:      Spouse name: Kory   Tobacco Use    Smoking status: Never    Smokeless tobacco: Never   Vaping Use     Vaping status: Never Used   Substance and Sexual Activity    Alcohol use: Not Currently     Comment: RARELY    Drug use: Never    Sexual activity: Yes     Partners: Male     Birth control/protection: Post-menopausal, None     Allergies:   Allergies   Allergen Reactions    Sulfa Antibiotics Other (See Comments)     CHILDHOOD REACTION      Medications:     Current Outpatient Medications:     albuterol sulfate  (90 Base) MCG/ACT inhaler, Inhale 2 puffs Every 4 (Four) Hours As Needed for Wheezing., Disp: , Rfl:     anastrozole (ARIMIDEX) 1 MG tablet, TAKE ONE TABLET BY MOUTH DAILY, Disp: 90 tablet, Rfl: 0    aspirin 81 MG EC tablet, Take 1 tablet by mouth Daily., Disp: , Rfl:     Breztri Aerosphere 160-9-4.8 MCG/ACT aerosol inhaler, 2 puffs., Disp: , Rfl:     fexofenadine-pseudoephedrine (ALLEGRA-D 24) 180-240 MG per 24 hr tablet, Take 1 tablet by mouth As Needed for Allergies., Disp: , Rfl:     fluticasone (FLONASE) 50 MCG/ACT nasal spray, 2 sprays into the nostril(s) as directed by provider As Needed for Rhinitis., Disp: , Rfl:     glipiZIDE (GLUCOTROL) 5 MG ER tablet, Take 1 tablet by mouth Daily., Disp: , Rfl:     metFORMIN (GLUCOPHAGE) 500 MG tablet, Take 1 tablet by mouth every night at bedtime., Disp: , Rfl:     montelukast (SINGULAIR) 10 MG tablet, Take 1 tablet by mouth Every Night., Disp: , Rfl:     nortriptyline (PAMELOR) 10 MG capsule, Take 1 capsule by mouth At Night As Needed (Diarrhea)., Disp: 90 capsule, Rfl: 3    simvastatin (ZOCOR) 10 MG tablet, Take 1 tablet by mouth Every Night., Disp: , Rfl:     Laboratory Values:    5/3/2024 glucose 157, HgbA1C 7.40    Review of Systems:   Constitutional: Negative for fevers or chills  HENT: Negative for hearing loss or runny nose  Eyes: Negative for vision changes or scleral icterus  Respiratory: Negative for shortness of breath  Cardiovascular: Negative for chest pain or heart palpitations  Gastrointestinal: Negative for abdominal pain, nausea,  "vomiting, constipation, melena, or hematochezia  Genitourinary: Negative for hematuria or dysuria  Musculoskeletal: Negative for joint swelling or gait instability  Neurologic: Negative for tremors or seizures  Psychiatric: Negative for suicidal ideations or depression  All other systems reviewed and negative    Physical Exam:   ECO - Asymptomatic  Constitutional: Well-developed, well-nourished, no acute distress  Ht: 157.5cm (62\")  Wt: 76.2kg (168lb)  BMI: 30.73 BMI is >=30 and <35. (Class 1 Obesity). Information on healthy weight added to patient's after visit summary.   Eyes: Conjunctiva normal, sclera nonicteric  ENMT: Hearing grossly normal, oral mucosa moist  Neck: Supple, no palpable mass, trachea midline  Respiratory: Clear to auscultation, normal inspiratory effort  Cardiovascular: Regular rate, no peripheral edema, no jugular venous distention  Breast:   Right: No visible abnormalities on inspection while seated, with arms raised, or hands on hips. No masses, skin changes, or nipple abnormalities.  Left: No visible abnormalities on inspection while seated, with arms raised, or hands on hips. No masses, skin changes, or nipple abnormalities.  Left breast periareolar incision at 6:00 healed.    No clinical chest wall involvement.  Lymphatics (palpable nodes): No cervical, supraclavicular, or axillary lymphadenopathy  Skin: Warm, dry, no rash on visualized skin surfaces  Musculoskeletal: Symmetric strength, normal gait  Psychiatric: Alert and oriented ×3, normal affect    Stephanie Sheffield PA-C    [facility] - General Surgery   [address], [address]  [place], [place] [id number] [place] [address] [address]  [place] [address]    Office: [phone]  Fax: [phone]  "

## 2024-06-17 RX ORDER — ANASTROZOLE 1 MG/1
1 TABLET ORAL DAILY
Qty: 90 TABLET | Refills: 0 | Status: SHIPPED | OUTPATIENT
Start: 2024-06-17

## 2024-09-06 ENCOUNTER — TELEPHONE (OUTPATIENT)
Dept: GASTROENTEROLOGY | Facility: CLINIC | Age: 79
End: 2024-09-06

## 2024-09-06 NOTE — TELEPHONE ENCOUNTER
Caller: Olinda Baumann    Relationship: Self    Best call back number: [phone]    Requested Prescriptions:   nortriptyline (PAMELOR) 10 MG capsule      Pharmacy where request should be sent:    [facility]    Last office visit with prescribing clinician: Visit date not found   Last telemedicine visit with prescribing clinician: Visit date not found   Next office visit with prescribing clinician: Visit date not found     Additional details provided by patient: PT IS CALLING TO SEE IF SHE CAN GET A NEW REFILL ON THIS. PT STATED SHE HAS SOME OLDER PILLS FROM PREVIOUS PRESCRIPTION BUT IS ALMOST OUT.    Does the patient have less than a 3 day supply:  [x] Yes  [] No    Would you like a call back once the refill request has been completed: [x] Yes [] No    If the office needs to give you a call back, can they leave a voicemail: [x] Yes [] No    Kian Vyas Rep   09/06/24 10:51 EDT

## 2024-09-10 RX ORDER — NORTRIPTYLINE HCL 10 MG
10 CAPSULE ORAL NIGHTLY PRN
Qty: 90 CAPSULE | Refills: 3 | Status: SHIPPED | OUTPATIENT
Start: 2024-09-10

## 2024-09-13 RX ORDER — ANASTROZOLE 1 MG/1
1 TABLET ORAL DAILY
Qty: 90 TABLET | Refills: 1 | Status: SHIPPED | OUTPATIENT
Start: 2024-09-13

## 2024-09-18 ENCOUNTER — OFFICE VISIT (OUTPATIENT)
Dept: ONCOLOGY | Facility: CLINIC | Age: 79
End: 2024-09-18
Payer: MEDICARE

## 2024-09-18 ENCOUNTER — LAB (OUTPATIENT)
Dept: LAB | Facility: HOSPITAL | Age: 79
End: 2024-09-18
Payer: MEDICARE

## 2024-09-18 VITALS
DIASTOLIC BLOOD PRESSURE: 82 MMHG | HEIGHT: 62 IN | RESPIRATION RATE: 16 BRPM | SYSTOLIC BLOOD PRESSURE: 160 MMHG | HEART RATE: 81 BPM | WEIGHT: 169.4 LBS | BODY MASS INDEX: 31.17 KG/M2 | OXYGEN SATURATION: 95 % | TEMPERATURE: 98.4 F

## 2024-09-18 DIAGNOSIS — Z79.811 AROMATASE INHIBITOR USE: ICD-10-CM

## 2024-09-18 DIAGNOSIS — D05.12 DUCTAL CARCINOMA IN SITU (DCIS) OF LEFT BREAST: ICD-10-CM

## 2024-09-18 DIAGNOSIS — D05.12 DUCTAL CARCINOMA IN SITU (DCIS) OF LEFT BREAST: Primary | ICD-10-CM

## 2024-09-18 LAB
ALBUMIN SERPL-MCNC: 4 G/DL (ref 3.5–5.2)
ALBUMIN/GLOB SERPL: 1.4 G/DL
ALP SERPL-CCNC: 90 U/L (ref 39–117)
ALT SERPL W P-5'-P-CCNC: 14 U/L (ref 1–33)
ANION GAP SERPL CALCULATED.3IONS-SCNC: 13.6 MMOL/L (ref 5–15)
AST SERPL-CCNC: 19 U/L (ref 1–32)
BASOPHILS # BLD AUTO: 0.01 10*3/MM3 (ref 0–0.2)
BASOPHILS NFR BLD AUTO: 0.1 % (ref 0–1.5)
BILIRUB SERPL-MCNC: 0.4 MG/DL (ref 0–1.2)
BUN SERPL-MCNC: 10 MG/DL (ref 8–23)
BUN/CREAT SERPL: 15.9 (ref 7–25)
CALCIUM SPEC-SCNC: 9 MG/DL (ref 8.6–10.5)
CHLORIDE SERPL-SCNC: 106 MMOL/L (ref 98–107)
CO2 SERPL-SCNC: 24.4 MMOL/L (ref 22–29)
CREAT SERPL-MCNC: 0.63 MG/DL (ref 0.57–1)
DEPRECATED RDW RBC AUTO: 42.3 FL (ref 37–54)
EGFRCR SERPLBLD CKD-EPI 2021: 90.9 ML/MIN/1.73
EOSINOPHIL # BLD AUTO: 0.18 10*3/MM3 (ref 0–0.4)
EOSINOPHIL NFR BLD AUTO: 2.4 % (ref 0.3–6.2)
ERYTHROCYTE [DISTWIDTH] IN BLOOD BY AUTOMATED COUNT: 13.6 % (ref 12.3–15.4)
GLOBULIN UR ELPH-MCNC: 2.8 GM/DL
GLUCOSE SERPL-MCNC: 110 MG/DL (ref 65–99)
HCT VFR BLD AUTO: 37.9 % (ref 34–46.6)
HGB BLD-MCNC: 12.5 G/DL (ref 12–15.9)
IMM GRANULOCYTES # BLD AUTO: 0.01 10*3/MM3 (ref 0–0.05)
IMM GRANULOCYTES NFR BLD AUTO: 0.1 % (ref 0–0.5)
LYMPHOCYTES # BLD AUTO: 2.17 10*3/MM3 (ref 0.7–3.1)
LYMPHOCYTES NFR BLD AUTO: 29.2 % (ref 19.6–45.3)
MCH RBC QN AUTO: 28 PG (ref 26.6–33)
MCHC RBC AUTO-ENTMCNC: 33 G/DL (ref 31.5–35.7)
MCV RBC AUTO: 85 FL (ref 79–97)
MONOCYTES # BLD AUTO: 0.62 10*3/MM3 (ref 0.1–0.9)
MONOCYTES NFR BLD AUTO: 8.4 % (ref 5–12)
NEUTROPHILS NFR BLD AUTO: 4.43 10*3/MM3 (ref 1.7–7)
NEUTROPHILS NFR BLD AUTO: 59.8 % (ref 42.7–76)
NRBC BLD AUTO-RTO: 0 /100 WBC (ref 0–0.2)
PLATELET # BLD AUTO: 235 10*3/MM3 (ref 140–450)
PMV BLD AUTO: 8.1 FL (ref 6–12)
POTASSIUM SERPL-SCNC: 4 MMOL/L (ref 3.5–5.2)
PROT SERPL-MCNC: 6.8 G/DL (ref 6–8.5)
RBC # BLD AUTO: 4.46 10*6/MM3 (ref 3.77–5.28)
SODIUM SERPL-SCNC: 144 MMOL/L (ref 136–145)
WBC NRBC COR # BLD AUTO: 7.42 10*3/MM3 (ref 3.4–10.8)

## 2024-09-18 PROCEDURE — 36415 COLL VENOUS BLD VENIPUNCTURE: CPT

## 2024-09-18 PROCEDURE — 85025 COMPLETE CBC W/AUTO DIFF WBC: CPT

## 2024-09-18 PROCEDURE — 1126F AMNT PAIN NOTED NONE PRSNT: CPT | Performed by: INTERNAL MEDICINE

## 2024-09-18 PROCEDURE — 99213 OFFICE O/P EST LOW 20 MIN: CPT | Performed by: INTERNAL MEDICINE

## 2024-09-18 PROCEDURE — 80053 COMPREHEN METABOLIC PANEL: CPT

## 2024-10-21 ENCOUNTER — HOSPITAL ENCOUNTER (OUTPATIENT)
Dept: BONE DENSITY | Facility: HOSPITAL | Age: 79
Discharge: HOME | End: 2024-10-21
Admitting: NURSE PRACTITIONER
Payer: MEDICARE

## 2024-10-21 DIAGNOSIS — Z79.818 LONG TERM (CURRENT) USE OF OTHER AGENTS AFFECTING ESTROGEN RECEPTORS AND ESTROGEN LEVELS: ICD-10-CM

## 2024-10-21 DIAGNOSIS — Z00.00 PERIODIC HEALTH ASSESSMENT, GENERAL SCREENING, ADULT: ICD-10-CM

## 2024-10-21 PROCEDURE — 77080 DXA BONE DENSITY AXIAL: CPT

## 2024-11-22 ENCOUNTER — LAB (OUTPATIENT)
Dept: LAB | Facility: HOSPITAL | Age: 79
End: 2024-11-22
Payer: MEDICARE

## 2024-11-22 ENCOUNTER — TRANSCRIBE ORDERS (OUTPATIENT)
Dept: LAB | Facility: HOSPITAL | Age: 79
End: 2024-11-22
Payer: MEDICARE

## 2024-11-22 DIAGNOSIS — D05.12 DUCTAL CARCINOMA IN SITU (DCIS) OF LEFT BREAST: ICD-10-CM

## 2024-11-22 DIAGNOSIS — E11.9 DIABETES MELLITUS WITHOUT COMPLICATION: ICD-10-CM

## 2024-11-22 DIAGNOSIS — G62.9 PERIPHERAL NERVE DISORDER: ICD-10-CM

## 2024-11-22 DIAGNOSIS — G62.9 PERIPHERAL NERVE DISORDER: Primary | ICD-10-CM

## 2024-11-22 LAB
ALBUMIN SERPL-MCNC: 4.3 G/DL (ref 3.5–5.2)
ALBUMIN/GLOB SERPL: 1.5 G/DL
ALP SERPL-CCNC: 93 U/L (ref 39–117)
ALT SERPL W P-5'-P-CCNC: 16 U/L (ref 1–33)
ANION GAP SERPL CALCULATED.3IONS-SCNC: 14.2 MMOL/L (ref 5–15)
AST SERPL-CCNC: 19 U/L (ref 1–32)
BASOPHILS # BLD AUTO: 0.02 10*3/MM3 (ref 0–0.2)
BASOPHILS NFR BLD AUTO: 0.3 % (ref 0–1.5)
BILIRUB SERPL-MCNC: 0.4 MG/DL (ref 0–1.2)
BUN SERPL-MCNC: 10 MG/DL (ref 8–23)
BUN/CREAT SERPL: 14.9 (ref 7–25)
CALCIUM SPEC-SCNC: 9.5 MG/DL (ref 8.6–10.5)
CHLORIDE SERPL-SCNC: 104 MMOL/L (ref 98–107)
CO2 SERPL-SCNC: 23.8 MMOL/L (ref 22–29)
CREAT SERPL-MCNC: 0.67 MG/DL (ref 0.57–1)
DEPRECATED RDW RBC AUTO: 41.1 FL (ref 37–54)
EGFRCR SERPLBLD CKD-EPI 2021: 89.6 ML/MIN/1.73
EOSINOPHIL # BLD AUTO: 0.08 10*3/MM3 (ref 0–0.4)
EOSINOPHIL NFR BLD AUTO: 1 % (ref 0.3–6.2)
ERYTHROCYTE [DISTWIDTH] IN BLOOD BY AUTOMATED COUNT: 13.4 % (ref 12.3–15.4)
FOLATE SERPL-MCNC: 14.7 NG/ML (ref 4.78–24.2)
GLOBULIN UR ELPH-MCNC: 2.9 GM/DL
GLUCOSE SERPL-MCNC: 141 MG/DL (ref 65–99)
HBA1C MFR BLD: 7.1 % (ref 4.8–5.6)
HCT VFR BLD AUTO: 42.2 % (ref 34–46.6)
HGB BLD-MCNC: 13.3 G/DL (ref 12–15.9)
IMM GRANULOCYTES # BLD AUTO: 0.03 10*3/MM3 (ref 0–0.05)
IMM GRANULOCYTES NFR BLD AUTO: 0.4 % (ref 0–0.5)
LYMPHOCYTES # BLD AUTO: 2.66 10*3/MM3 (ref 0.7–3.1)
LYMPHOCYTES NFR BLD AUTO: 34.4 % (ref 19.6–45.3)
MCH RBC QN AUTO: 26.8 PG (ref 26.6–33)
MCHC RBC AUTO-ENTMCNC: 31.5 G/DL (ref 31.5–35.7)
MCV RBC AUTO: 85.1 FL (ref 79–97)
MONOCYTES # BLD AUTO: 0.71 10*3/MM3 (ref 0.1–0.9)
MONOCYTES NFR BLD AUTO: 9.2 % (ref 5–12)
NEUTROPHILS NFR BLD AUTO: 4.24 10*3/MM3 (ref 1.7–7)
NEUTROPHILS NFR BLD AUTO: 54.7 % (ref 42.7–76)
NRBC BLD AUTO-RTO: 0 /100 WBC (ref 0–0.2)
PLATELET # BLD AUTO: 258 10*3/MM3 (ref 140–450)
PMV BLD AUTO: 8.6 FL (ref 6–12)
POTASSIUM SERPL-SCNC: 4.2 MMOL/L (ref 3.5–5.2)
PROT SERPL-MCNC: 7.2 G/DL (ref 6–8.5)
RBC # BLD AUTO: 4.96 10*6/MM3 (ref 3.77–5.28)
SODIUM SERPL-SCNC: 142 MMOL/L (ref 136–145)
VIT B12 BLD-MCNC: 352 PG/ML (ref 211–946)
WBC NRBC COR # BLD AUTO: 7.74 10*3/MM3 (ref 3.4–10.8)

## 2024-11-22 PROCEDURE — 80053 COMPREHEN METABOLIC PANEL: CPT

## 2024-11-22 PROCEDURE — 83036 HEMOGLOBIN GLYCOSYLATED A1C: CPT

## 2024-11-22 PROCEDURE — 36415 COLL VENOUS BLD VENIPUNCTURE: CPT

## 2024-11-22 PROCEDURE — 82607 VITAMIN B-12: CPT

## 2024-11-22 PROCEDURE — 85025 COMPLETE CBC W/AUTO DIFF WBC: CPT

## 2024-11-22 PROCEDURE — 82746 ASSAY OF FOLIC ACID SERUM: CPT

## 2025-02-07 ENCOUNTER — LAB (OUTPATIENT)
Dept: LAB | Facility: HOSPITAL | Age: 80
End: 2025-02-07
Payer: MEDICARE

## 2025-02-07 ENCOUNTER — TRANSCRIBE ORDERS (OUTPATIENT)
Dept: ADMINISTRATIVE | Facility: HOSPITAL | Age: 80
End: 2025-02-07
Payer: MEDICARE

## 2025-02-07 DIAGNOSIS — R05.9 COUGH, UNSPECIFIED TYPE: Primary | ICD-10-CM

## 2025-02-07 DIAGNOSIS — R05.9 COUGH, UNSPECIFIED TYPE: ICD-10-CM

## 2025-02-07 LAB
B PARAPERT DNA SPEC QL NAA+PROBE: NOT DETECTED
B PERT DNA SPEC QL NAA+PROBE: NOT DETECTED
C PNEUM DNA NPH QL NAA+NON-PROBE: NOT DETECTED
FLUAV H1 2009 PAND RNA NPH QL NAA+PROBE: DETECTED
FLUBV RNA ISLT QL NAA+PROBE: NOT DETECTED
HADV DNA SPEC NAA+PROBE: NOT DETECTED
HCOV 229E RNA SPEC QL NAA+PROBE: NOT DETECTED
HCOV HKU1 RNA SPEC QL NAA+PROBE: NOT DETECTED
HCOV NL63 RNA SPEC QL NAA+PROBE: NOT DETECTED
HCOV OC43 RNA SPEC QL NAA+PROBE: NOT DETECTED
HMPV RNA NPH QL NAA+NON-PROBE: NOT DETECTED
HPIV1 RNA ISLT QL NAA+PROBE: NOT DETECTED
HPIV2 RNA SPEC QL NAA+PROBE: NOT DETECTED
HPIV3 RNA NPH QL NAA+PROBE: NOT DETECTED
HPIV4 P GENE NPH QL NAA+PROBE: NOT DETECTED
M PNEUMO IGG SER IA-ACNC: NOT DETECTED
RHINOVIRUS RNA SPEC NAA+PROBE: NOT DETECTED
RSV RNA NPH QL NAA+NON-PROBE: NOT DETECTED
SARS-COV-2 RNA NPH QL NAA+NON-PROBE: NOT DETECTED

## 2025-02-07 PROCEDURE — 0202U NFCT DS 22 TRGT SARS-COV-2: CPT

## 2025-03-04 DIAGNOSIS — D05.12 DUCTAL CARCINOMA IN SITU (DCIS) OF LEFT BREAST: Primary | ICD-10-CM

## 2025-03-05 ENCOUNTER — OFFICE VISIT (OUTPATIENT)
Dept: ONCOLOGY | Facility: CLINIC | Age: 80
End: 2025-03-05
Payer: MEDICARE

## 2025-03-05 ENCOUNTER — LAB (OUTPATIENT)
Dept: LAB | Facility: HOSPITAL | Age: 80
End: 2025-03-05
Payer: MEDICARE

## 2025-03-05 VITALS
HEIGHT: 62 IN | TEMPERATURE: 98.1 F | BODY MASS INDEX: 30.91 KG/M2 | HEART RATE: 90 BPM | WEIGHT: 168 LBS | SYSTOLIC BLOOD PRESSURE: 154 MMHG | DIASTOLIC BLOOD PRESSURE: 80 MMHG | OXYGEN SATURATION: 97 %

## 2025-03-05 DIAGNOSIS — Z79.811 AROMATASE INHIBITOR USE: ICD-10-CM

## 2025-03-05 DIAGNOSIS — D05.12 DUCTAL CARCINOMA IN SITU (DCIS) OF LEFT BREAST: ICD-10-CM

## 2025-03-05 DIAGNOSIS — Z12.31 SCREENING MAMMOGRAM, ENCOUNTER FOR: ICD-10-CM

## 2025-03-05 DIAGNOSIS — D05.12 DUCTAL CARCINOMA IN SITU (DCIS) OF LEFT BREAST: Primary | ICD-10-CM

## 2025-03-05 LAB
BASOPHILS # BLD AUTO: 0.02 10*3/MM3 (ref 0–0.2)
BASOPHILS NFR BLD AUTO: 0.3 % (ref 0–1.5)
DEPRECATED RDW RBC AUTO: 41.8 FL (ref 37–54)
EOSINOPHIL # BLD AUTO: 0.09 10*3/MM3 (ref 0–0.4)
EOSINOPHIL NFR BLD AUTO: 1.2 % (ref 0.3–6.2)
ERYTHROCYTE [DISTWIDTH] IN BLOOD BY AUTOMATED COUNT: 13.6 % (ref 12.3–15.4)
HCT VFR BLD AUTO: 39.4 % (ref 34–46.6)
HGB BLD-MCNC: 13.1 G/DL (ref 12–15.9)
IMM GRANULOCYTES # BLD AUTO: 0.04 10*3/MM3 (ref 0–0.05)
IMM GRANULOCYTES NFR BLD AUTO: 0.5 % (ref 0–0.5)
LYMPHOCYTES # BLD AUTO: 2.44 10*3/MM3 (ref 0.7–3.1)
LYMPHOCYTES NFR BLD AUTO: 31.6 % (ref 19.6–45.3)
MCH RBC QN AUTO: 28 PG (ref 26.6–33)
MCHC RBC AUTO-ENTMCNC: 33.2 G/DL (ref 31.5–35.7)
MCV RBC AUTO: 84.2 FL (ref 79–97)
MONOCYTES # BLD AUTO: 0.7 10*3/MM3 (ref 0.1–0.9)
MONOCYTES NFR BLD AUTO: 9.1 % (ref 5–12)
NEUTROPHILS NFR BLD AUTO: 4.42 10*3/MM3 (ref 1.7–7)
NEUTROPHILS NFR BLD AUTO: 57.3 % (ref 42.7–76)
NRBC BLD AUTO-RTO: 0 /100 WBC (ref 0–0.2)
PLATELET # BLD AUTO: 183 10*3/MM3 (ref 140–450)
PMV BLD AUTO: 9.7 FL (ref 6–12)
RBC # BLD AUTO: 4.68 10*6/MM3 (ref 3.77–5.28)
WBC NRBC COR # BLD AUTO: 7.71 10*3/MM3 (ref 3.4–10.8)

## 2025-03-05 PROCEDURE — 85025 COMPLETE CBC W/AUTO DIFF WBC: CPT

## 2025-03-05 PROCEDURE — 36415 COLL VENOUS BLD VENIPUNCTURE: CPT

## 2025-03-05 RX ORDER — ANASTROZOLE 1 MG/1
1 TABLET ORAL DAILY
Qty: 90 TABLET | Refills: 3 | Status: SHIPPED | OUTPATIENT
Start: 2025-03-05

## 2025-03-27 RX ORDER — ANASTROZOLE 1 MG/1
1 TABLET ORAL DAILY
Qty: 90 TABLET | Refills: 3 | Status: SHIPPED | OUTPATIENT
Start: 2025-03-27

## 2025-04-28 ENCOUNTER — HOSPITAL ENCOUNTER (OUTPATIENT)
Dept: MAMMOGRAPHY | Facility: HOSPITAL | Age: 80
Discharge: HOME | End: 2025-04-28
Admitting: NURSE PRACTITIONER
Payer: MEDICARE

## 2025-04-28 DIAGNOSIS — D05.12 DUCTAL CARCINOMA IN SITU (DCIS) OF LEFT BREAST: ICD-10-CM

## 2025-04-28 DIAGNOSIS — Z79.811 AROMATASE INHIBITOR USE: ICD-10-CM

## 2025-04-28 DIAGNOSIS — Z12.31 SCREENING MAMMOGRAM, ENCOUNTER FOR: ICD-10-CM

## 2025-04-28 PROCEDURE — 77063 BREAST TOMOSYNTHESIS BI: CPT

## 2025-04-28 PROCEDURE — 77067 SCR MAMMO BI INCL CAD: CPT

## 2025-05-01 ENCOUNTER — RESULTS FOLLOW-UP (OUTPATIENT)
Dept: ONCOLOGY | Facility: CLINIC | Age: 80
End: 2025-05-01
Payer: MEDICARE

## 2025-05-01 DIAGNOSIS — R92.8 ABNORMALITY OF LEFT BREAST ON SCREENING MAMMOGRAM: Primary | ICD-10-CM

## 2025-05-01 NOTE — TELEPHONE ENCOUNTER
Called pt and informed her of need for additional imaging. She v/u. Orders placed and message sent to scheduling.       ----- Message from Kayy Harrington sent at 4/30/2025  8:56 AM EDT -----  Can you please order a diagnostic mammogram and ultrasound for this patient and let her know we need some extra imaging.      Thanks    vamsi  ----- Message -----  From: Interface, Rad Results [place] In  Sent: 4/28/2025   1:54 PM EDT  To: Kayy Harrington, MOISES

## 2025-05-05 ENCOUNTER — TRANSCRIBE ORDERS (OUTPATIENT)
Dept: ADMINISTRATIVE | Facility: HOSPITAL | Age: 80
End: 2025-05-05
Payer: MEDICARE

## 2025-05-05 DIAGNOSIS — J98.4 RESTRICTIVE LUNG DISEASE: Primary | ICD-10-CM

## 2025-05-09 ENCOUNTER — TRANSCRIBE ORDERS (OUTPATIENT)
Dept: ADMINISTRATIVE | Facility: HOSPITAL | Age: 80
End: 2025-05-09
Payer: MEDICARE

## 2025-05-09 ENCOUNTER — LAB (OUTPATIENT)
Dept: LAB | Facility: HOSPITAL | Age: 80
End: 2025-05-09
Payer: MEDICARE

## 2025-05-09 DIAGNOSIS — E11.9 TYPE II DIABETES MELLITUS WITH HBA1C GOAL TO BE DETERMINED: ICD-10-CM

## 2025-05-09 DIAGNOSIS — E03.9 HYPOTHYROIDISM, UNSPECIFIED TYPE: ICD-10-CM

## 2025-05-09 DIAGNOSIS — E11.9 TYPE II DIABETES MELLITUS WITH HBA1C GOAL TO BE DETERMINED: Primary | ICD-10-CM

## 2025-05-09 LAB
ALBUMIN SERPL-MCNC: 4.1 G/DL (ref 3.5–5.2)
ALBUMIN/GLOB SERPL: 1.4 G/DL
ALP SERPL-CCNC: 95 U/L (ref 39–117)
ALT SERPL W P-5'-P-CCNC: 14 U/L (ref 1–33)
ANION GAP SERPL CALCULATED.3IONS-SCNC: 9.4 MMOL/L (ref 5–15)
AST SERPL-CCNC: 18 U/L (ref 1–32)
BASOPHILS # BLD AUTO: 0.01 10*3/MM3 (ref 0–0.2)
BASOPHILS NFR BLD AUTO: 0.1 % (ref 0–1.5)
BILIRUB SERPL-MCNC: 0.4 MG/DL (ref 0–1.2)
BUN SERPL-MCNC: 10 MG/DL (ref 8–23)
BUN/CREAT SERPL: 14.5 (ref 7–25)
CALCIUM SPEC-SCNC: 9.5 MG/DL (ref 8.6–10.5)
CHLORIDE SERPL-SCNC: 104 MMOL/L (ref 98–107)
CHOLEST SERPL-MCNC: 178 MG/DL (ref 0–200)
CO2 SERPL-SCNC: 24.6 MMOL/L (ref 22–29)
CREAT SERPL-MCNC: 0.69 MG/DL (ref 0.57–1)
DEPRECATED RDW RBC AUTO: 41.9 FL (ref 37–54)
EGFRCR SERPLBLD CKD-EPI 2021: 88.4 ML/MIN/1.73
EOSINOPHIL # BLD AUTO: 0.05 10*3/MM3 (ref 0–0.4)
EOSINOPHIL NFR BLD AUTO: 0.7 % (ref 0.3–6.2)
ERYTHROCYTE [DISTWIDTH] IN BLOOD BY AUTOMATED COUNT: 13.5 % (ref 12.3–15.4)
GLOBULIN UR ELPH-MCNC: 2.9 GM/DL
GLUCOSE SERPL-MCNC: 117 MG/DL (ref 65–99)
HBA1C MFR BLD: 7.4 % (ref 4.8–5.6)
HCT VFR BLD AUTO: 39.8 % (ref 34–46.6)
HDLC SERPL-MCNC: 57 MG/DL (ref 40–60)
HGB BLD-MCNC: 13.2 G/DL (ref 12–15.9)
IMM GRANULOCYTES # BLD AUTO: 0.02 10*3/MM3 (ref 0–0.05)
IMM GRANULOCYTES NFR BLD AUTO: 0.3 % (ref 0–0.5)
LDLC SERPL CALC-MCNC: 95 MG/DL (ref 0–100)
LDLC/HDLC SERPL: 1.59 {RATIO}
LYMPHOCYTES # BLD AUTO: 2.48 10*3/MM3 (ref 0.7–3.1)
LYMPHOCYTES NFR BLD AUTO: 32.8 % (ref 19.6–45.3)
MCH RBC QN AUTO: 28 PG (ref 26.6–33)
MCHC RBC AUTO-ENTMCNC: 33.2 G/DL (ref 31.5–35.7)
MCV RBC AUTO: 84.5 FL (ref 79–97)
MONOCYTES # BLD AUTO: 0.6 10*3/MM3 (ref 0.1–0.9)
MONOCYTES NFR BLD AUTO: 7.9 % (ref 5–12)
NEUTROPHILS NFR BLD AUTO: 4.41 10*3/MM3 (ref 1.7–7)
NEUTROPHILS NFR BLD AUTO: 58.2 % (ref 42.7–76)
NRBC BLD AUTO-RTO: 0 /100 WBC (ref 0–0.2)
PLATELET # BLD AUTO: 235 10*3/MM3 (ref 140–450)
PMV BLD AUTO: 8.4 FL (ref 6–12)
POTASSIUM SERPL-SCNC: 4.4 MMOL/L (ref 3.5–5.2)
PROT SERPL-MCNC: 7 G/DL (ref 6–8.5)
RBC # BLD AUTO: 4.71 10*6/MM3 (ref 3.77–5.28)
SODIUM SERPL-SCNC: 138 MMOL/L (ref 136–145)
T4 FREE SERPL-MCNC: 1.4 NG/DL (ref 0.92–1.68)
TRIGL SERPL-MCNC: 153 MG/DL (ref 0–150)
TSH SERPL DL<=0.05 MIU/L-ACNC: 1.41 UIU/ML (ref 0.27–4.2)
VLDLC SERPL-MCNC: 26 MG/DL (ref 5–40)
WBC NRBC COR # BLD AUTO: 7.57 10*3/MM3 (ref 3.4–10.8)

## 2025-05-09 PROCEDURE — 83036 HEMOGLOBIN GLYCOSYLATED A1C: CPT

## 2025-05-09 PROCEDURE — 80061 LIPID PANEL: CPT

## 2025-05-09 PROCEDURE — 84439 ASSAY OF FREE THYROXINE: CPT

## 2025-05-09 PROCEDURE — 80053 COMPREHEN METABOLIC PANEL: CPT

## 2025-05-09 PROCEDURE — 36415 COLL VENOUS BLD VENIPUNCTURE: CPT

## 2025-05-09 PROCEDURE — 85025 COMPLETE CBC W/AUTO DIFF WBC: CPT

## 2025-05-09 PROCEDURE — 84443 ASSAY THYROID STIM HORMONE: CPT

## 2025-05-13 ENCOUNTER — TRANSCRIBE ORDERS (OUTPATIENT)
Dept: ADMINISTRATIVE | Facility: HOSPITAL | Age: 80
End: 2025-05-13
Payer: MEDICARE

## 2025-05-13 DIAGNOSIS — R01.1 SYSTOLIC MURMUR: Primary | ICD-10-CM

## 2025-05-16 ENCOUNTER — OFFICE VISIT (OUTPATIENT)
Dept: SURGERY | Facility: CLINIC | Age: 80
End: 2025-05-16
Payer: MEDICARE

## 2025-05-16 VITALS
HEART RATE: 73 BPM | BODY MASS INDEX: 30.88 KG/M2 | WEIGHT: 167.8 LBS | HEIGHT: 62 IN | DIASTOLIC BLOOD PRESSURE: 70 MMHG | OXYGEN SATURATION: 98 % | SYSTOLIC BLOOD PRESSURE: 140 MMHG

## 2025-05-16 DIAGNOSIS — Z85.3 HISTORY OF BREAST CANCER: Primary | ICD-10-CM

## 2025-05-16 RX ORDER — LATANOPROST 50 UG/ML
SOLUTION/ DROPS OPHTHALMIC
COMMUNITY
Start: 2025-05-06

## 2025-05-19 NOTE — PROGRESS NOTES
Assessment/Plan:  80 y.o. female with a history of left breast DCIS: Grade III, ER+/GA+; [id number], Stage 0.      A multidisciplinary plan has been formulated for the patient:    (1) Breast Surgical Oncology:  - Status post left breast wire localized lumpectomy 8/2022.  - Bilateral screening mammogram 04/2025 BIRADS 0.  Diagnostic mammogram of the left breast scheduled for 5/27/2025.  Will follow-up results.  - Follow up in one year.    (2) Medical Oncology:  - Following with Dr. Rasmussen. Currently on Arimidex.     (3) Radiation Oncology:  - Followed with Dr. Harley.  - Patient decided not to proceed with radiation.    (4) Health Maintenance:  - Colon cancer screening: Colonoscopy 5/8/2024 Dr. Alan. History of sessile serrated polyp.     Chief Complaint: routine followup breast cancer    History of Present Illness:   7/5/2022 Seen by Dr. Juarez:  Ms. Olinda aBumann is a 80 y.o. year old lady, seen at the request of No ref. provider found for a new diagnosis of left breast cancer.    This was initially detected as an imaging abnormality. She has had annual mammograms each year. She has no prior history of breast biopsy. Her work-up is detailed in the oncologic history below.   She denies any breast lumps, pain, skin changes, or nipple discharge. She denies any family history of breast or ovarian cancer.     9/12/2022 Seen by Dr. Juarez:  she presents today for follow-up.  She is doing very well.  Her pain is controlled.  She has no concerns with her incision.  She complains of a few small aches along the lateral aspect of her chest wall.  He is getting back to her daily activities.    5/10/2023 She is here today for a follow up. She has recently been having trouble with her asthma and allergies. She has been using her inhaler and recently started using a nebulizer to help as well. Otherwise, denies any complaints. Denies any new breast concerns.  Denies any breast lumps, pain, or skin changes. She recently had her  mammogram done and it was benign. She is tolerating her Arimidex.     5/20/2024 She presents today for a follow-up. Denies any new breast masses or skin changes.  She has occasional tenderness near her incision, states this is usually only with certain clothes.  She is continuing to tolerate her Arimidex.  She is up-to-date on her annual mammogram.    5/16/2025 she presents today for follow-up.  She is done well since I last saw her.  She has no concerns with her breast exam.  She denies masses or skin changes.  She did recently have a mammogram and is recommended for a follow-up.  She is following with Dr. Rasmussen and on Arimidex.  Having some weight gain but otherwise no issues.  She is up-to-date with her primary care doctor.    Workup of Current Diagnosis:    4/12/2022 Bilateral Screening Mammogram:  IMPRESSION:  1. There are multiple microcalcifications seen in the anterior one-third retroareolar region of the left breast. Further evaluation with spot magnification CC and 90 degree lateral images is recommended.  2. There is an area of focal asymmetry in the middle third lateral aspect of the right breast. Further evaluation with spot compression CC mammographic and tomosynthesis images and possible targeted right breast sonography is recommended.  BI-RADS Category 0: Incomplete - Needs additional imaging evaluation.    5/12/2022 Bilateral Diagnostic Mammogram:  In the right breast in the middle third lateral aspect of the previous described area of focal asymmetry resolves with additional imaging. This is consistent with normal breast parenchyma. No suspicious findings in the right breast are visualized.  In the left breast additional imaging demonstrates the presence of multiple microcalcifications in the cluster with a linear distribution spanning a distance of approximately 1.8 cm seen in the retroareolar region of the left breast at the 6 clock position. There is a question of mild  pleomorphism.  IMPRESSION:  1. There is a 1.8 cm cluster of linear microcalcifications in the retroareolar region of the left breast at the 6 o'clock position. Correlation with a stereo-tactic guided left breast biopsy is recommended.  2. There are no findings suspicious for malignancy in the right breast.  BI-RADS Category 4: Suspicious abnormality. Biopsy should be considered.    6/13/2022 Left Breast Stereotactic Biopsy:  The calcifications in the anterior left breast was/were localized and targeted under stereotactic/tomosynthesis guidance via a(n) medial approach. 4 core specimens were obtained. Specimen radiography demonstrated the targeted calcifications. A tribell clip was then deployed at the biopsy site. The patient tolerated the procedure well with no immediate complications.  Post-procedural digital mammographic views of the left breast demonstrate the tribell clip at the expected location at the site of biopsy in the lower central anterior left breast at the lateral margin of residual calcifications.  IMPRESSION:  1. Stereotactic/Tomosynthesis guided core needle biopsy of a 1.8 cm group of calcifications at 6:00 in the retroareolar left breast, marked with a tribell biopsy clip, where there are residual calcifications. Pathology is malignant and concordant with the imaging assessment. Surgical consultation is recommended. Contrast-enhanced breast MRI should be considered to evaluate extent of disease.  Pathology:  Final Diagnosis   1. Breast, Left, Retro, 6 O'clock, Biopsy: Ductal carcinoma in situ (DCIS).               A. The ductal carcinoma in situ is of high nuclear grade with comedo, solid and cribriform architecture and expansive necrosis.                 B. Microcalcifications are associated with the ductal carcinoma in situ.               C. DCIS measures up to 3 mm on the slide.     7/2/2022 Bilateral Breast MRI:  IMPRESSION:  1. Biopsy-proven site of DCIS in the left breast in the retroareolar  region at the 6 o'clock position with the Tri-Bell shaped metallic clip located within a postbiopsy cavity that measures up to 1.3 cm in greatest dimension. Only nonspecific enhancement surrounding the cavity is visualized. No evidence for left axillary adenopathy is appreciated and no other suspicious findings are seen within the left breast. If breast conservation therapy is desired then surgical excision of any remaining suspicious microcalcifications at the biopsy site is recommended.  2. There are no findings suspicious for malignancy in the right breast.   BI-RADS CATEGORY 6: Known biopsy-proven malignancy.    8/18/2022 Left breast wire localized lumpectomy:  Final Diagnosis   1. Left Breast, Needle Localization Lumpectomy:               A. Biopsy site changes are identified and metallic clip retrieved.               B. No ductal carcinoma in-situ nor invasive carcinoma identified.               C. No lobular neoplasia (LCIS, ALH) identified (see comment).               D. Scattered adenosis, sclerosing adenosis, fibroadenomatous hyperplasia, fibrocystic change, usual ductal hyperplasia and focal clustered ductal cysts identified.  E. Rare calcification present in benign breast tissue.  F. Surgical margins are viable and negative for malignancy.  G. Rare microcalcification noted within benign breast tissue.   2. Left Breast, Additional Superior Margin:                 A. No atypical hyperplasia, in-situ carcinoma nor invasive carcinoma identified.               B. New margin is negative for malignancy by additional 8 mm.  3. Left Breast, Additional Inferior Margin:                 A. No atypical hyperplasia, in-situ carcinoma nor invasive carcinoma identified.               B. New margin is negative for malignancy by additional 10 mm.  4. Left Breast, Additional Medial Margin:                 A. No atypical hyperplasia, in-situ carcinoma nor invasive carcinoma identified.               B. New margin is  negative for malignancy by additional 7 mm.  5. Left Breast, Additional Lateral Margin:                 A. No atypical hyperplasia, in-situ carcinoma nor invasive carcinoma identified.               B. New margin is negative for malignancy by additional 6 mm.  6. Left Breast, Additional Anterior Margin:                 A. No atypical hyperplasia, in-situ carcinoma nor invasive carcinoma identified.               B. New margin is negative for malignancy by additional 12 mm.  7. Left Breast, Additional Posterior Margin:                 A. No atypical hyperplasia, in-situ carcinoma nor invasive carcinoma identified.               B. New margin is negative for malignancy by additional 12 mm.     4/24/2023 Bilateral Screening Mammogram:  There is scattered fibroglandular density which is symmetric. There are no findings to suggest malignancy. The right breast shows no change from 04/24/2023. The patient has had left breast surgery for breast cancer and there are minimal postsurgical changes that appear benign.  CONCLUSION: Benign bilateral mammogram.   BI-RADS Category 2: Benign findings.    4/25/2024 Bilateral Screening Mammogram:  FINDINGS:   Scattered fibroglandular densities are seen throughout both breasts in a pattern which is unchanged. No new or dominant masses, areas of architectural distortion or skin thickening. There is no evidence for axillary lymphadenopathy or nipple retraction.  IMPRESSION:  1. There is no evidence for malignancy or significant change in either breast. Routine followup mammography is recommended.  BI-RADS category 1: Negative.    4/28/2025 Bilateral Screening Mammogram:  BREAST  DENSITY: There are scattered areas of fibroglandular density. There is a partially calcified nodule in the posterior third of the upper outer right breast most consistent with a degenerative fibroadenoma that is unchanged from 4/25/2024. There are multiple clustered microcalcifications in the middle third of the  slightly upper slightly outer left breast which are new since the previous exam and further evaluation with magnification imaging and true lateral view and possible directed left breast ultrasound is recommended.    IMPRESSION/RECOMMENDATION(S):  Incomplete mammogram showing new clustered microcalcifications in left breast for which further imaging is recommended as described above. Recommend annual screening mammogram in one year.  BI-RADS Category 0: Incomplete : Needs Additional Imaging Evaluation     Gynecologic History:   . P:0 AB:6  Age at first childbirth: N/A  Lactation/How long: N/A  Age at menarche: 13  Age at menopause: 51  Total years of oral contraceptive use: 5-6 years previously  Total years of hormone replacement therapy: No    Past Medical History:   Past Medical History:   Diagnosis Date    Arthritis     Asthma     Breast cancer     Breast cancer, left     Chronic cough     Diabetes mellitus     Fibrocystic breast     Fracture of ankle     Pin on both sides    GERD (gastroesophageal reflux disease)     Goiter     INWARD GOITER    Hearing loss     left    History of colon polyps     History of skin cancer     Hyperlipidemia     Hypertension     OFF MEDS X  4-5 YEARS    Lung nodule     FOLLOWED BY DR RAMÍREZ - WATCHING WITH REGULAR CT SCANS    Overactive bladder     Rotator cuff syndrome 9/3/2019    Seasonal allergies     Vertigo      Past Surgical History:    Past Surgical History:   Procedure Laterality Date    ANKLE OPEN REDUCTION INTERNAL FIXATION  2002    BREAST CYST EXCISION Left     BREAST LUMPECTOMY Left 2022    Procedure: left breast wire localized lumpectomy;  Surgeon: Sarah Juarez MD;  Location[place] [facility] [place] [facility] [place];  Service: General;  Laterality: Left;    CATARACT EXTRACTION EXTRACAPSULAR W/ INTRAOCULAR LENS IMPLANTATION      COLONOSCOPY N/A 2017    TA polyps, IH    COLONOSCOPY N/A 2019    Procedure: COLONOSCOPY TO CECUM WITH COLD BIOPSIES;  Surgeon:  Marquez Alan MD;  Location: [facility] ENDOSCOPY;  Service: Gastroenterology    COLONOSCOPY N/A 05/08/2024    Procedure: COLONOSCOPY to cecum and TI with saline injection and hot snare / cold snare polypectomies;  Surgeon: Marquez Alan MD;  Location: [facility] ENDOSCOPY;  Service: Gastroenterology;  Laterality: N/A;  Pre - h/o colon polyps.  Post - polyps    D & C HYSTEROSCOPY N/A 10/14/2020    Procedure: DILATATION AND CURETTAGE HYSTEROSCOPY WITH MYOSURE;  Surgeon: Mariano Calix MD;  Location: [facility] [place];  Service: Obstetrics/Gynecology;  Laterality: N/A;    DENTAL PROCEDURE      DILATATION AND CURETTAGE      MULTIPLE D/T MISCARRIAGES    ENDOSCOPY N/A 06/08/2022    Procedure: ESOPHAGOGASTRODUODENOSCOPY with biopsy;  Surgeon: Marquez Alan MD;  Location: [facility] ENDOSCOPY;  Service: Gastroenterology;  Laterality: N/A;  gastritis    EYE SURGERY  3/4/2015    FOOT SURGERY  September 2002    ORIF ANKLE FRACTURE Left     WITH INSTRUMENTATION    SKIN CANCER EXCISION Right     LEG     Family History:    Family History   Problem Relation Age of Onset    GI problems Mother         ileostomy    Dementia Mother     Pancreatic cancer Father     Arthritis Father     Diabetes Father     Malig Hyperthermia Neg Hx     Breast cancer Neg Hx     Ovarian cancer Neg Hx      Social History:  Social History     Socioeconomic History    Marital status:      Spouse name: Kory   Tobacco Use    Smoking status: Never    Smokeless tobacco: Never   Vaping Use    Vaping status: Never Used   Substance and Sexual Activity    Alcohol use: Never     Comment: RARELY    Drug use: Never    Sexual activity: Yes     Partners: Male     Birth control/protection: Post-menopausal, None     Allergies:   Allergies   Allergen Reactions    Sulfa Antibiotics Other (See Comments)     CHILDHOOD REACTION      Medications:     Current Outpatient Medications:     albuterol sulfate  (90 Base) MCG/ACT inhaler, Inhale 2 puffs Every 4 (Four)  Hours As Needed for Wheezing., Disp: , Rfl:     anastrozole (ARIMIDEX) 1 MG tablet, Take 1 tablet by mouth Daily., Disp: 90 tablet, Rfl: 3    aspirin 81 MG EC tablet, Take 1 tablet by mouth Daily., Disp: , Rfl:     fexofenadine-pseudoephedrine (ALLEGRA-D 24) 180-240 MG per 24 hr tablet, Take 1 tablet by mouth As Needed for Allergies., Disp: , Rfl:     fluticasone (FLONASE) 50 MCG/ACT nasal spray, Administer 2 sprays into the nostril(s) as directed by provider As Needed for Rhinitis., Disp: , Rfl:     glipiZIDE (GLUCOTROL) 5 MG ER tablet, Take 1 tablet by mouth Daily., Disp: , Rfl:     latanoprost (XALATAN) 0.005 % ophthalmic solution, , Disp: , Rfl:     metFORMIN (GLUCOPHAGE) 500 MG tablet, Take 1 tablet by mouth every night at bedtime., Disp: , Rfl:     montelukast (SINGULAIR) 10 MG tablet, Take 1 tablet by mouth Every Night., Disp: , Rfl:     nortriptyline (PAMELOR) 10 MG capsule, Take 1 capsule by mouth At Night As Needed (Diarrhea)., Disp: 90 capsule, Rfl: 3    simvastatin (ZOCOR) 10 MG tablet, Take 1 tablet by mouth Every Night., Disp: , Rfl:     Breztri Aerosphere 160-9-4.8 MCG/ACT aerosol inhaler, 2 puffs. (Patient not taking: Reported on 2025), Disp: , Rfl:     Laboratory Values:    Labs from 2025 reviewed by me; hemoglobin A1c 7.4    Review of Systems:   Constitutional: Negative for fevers or chills  HENT: Negative for hearing loss or runny nose  Eyes: Negative for vision changes or scleral icterus  Respiratory: Negative for shortness of breath  Cardiovascular: Negative for chest pain or heart palpitations  Gastrointestinal: Negative for abdominal pain, nausea, vomiting, constipation, melena, or hematochezia  Genitourinary: Negative for hematuria or dysuria  Musculoskeletal: Negative for joint swelling or gait instability  Neurologic: Negative for tremors or seizures  Psychiatric: Negative for suicidal ideations or depression  All other systems reviewed and negative    Physical Exam:   ECO -  Asymptomatic  Constitutional: Well-developed, well-nourished, no acute distress  Eyes: Conjunctiva normal, sclera nonicteric  ENMT: Hearing grossly normal, oral mucosa moist  Neck: Supple, no palpable mass, trachea midline  Respiratory: Clear to auscultation, normal inspiratory effort  Cardiovascular: Regular rate, no peripheral edema, no jugular venous distention  Breast:   Right: No visible abnormalities on inspection while seated, with arms raised, or hands on hips. No masses, skin changes, or nipple abnormalities.  Left: No visible abnormalities on inspection while seated, with arms raised, or hands on hips. No masses, skin changes, or nipple abnormalities.  Left breast periareolar incision at 6:00 healed.    No clinical chest wall involvement.  Lymphatics (palpable nodes): No cervical, supraclavicular, or axillary lymphadenopathy  Skin: Warm, dry, no rash on visualized skin surfaces  Musculoskeletal: Symmetric strength, normal gait  Psychiatric: Alert and oriented ×3, normal affect    Sarah Juarez MD   [facility] - General Surgery   [address], [address]  [address], [address]  [place] [address]    Office: [phone]  Fax: [phone]

## 2025-05-27 ENCOUNTER — HOSPITAL ENCOUNTER (OUTPATIENT)
Dept: ULTRASOUND IMAGING | Facility: HOSPITAL | Age: 80
End: 2025-05-27
Payer: MEDICARE

## 2025-05-27 ENCOUNTER — HOSPITAL ENCOUNTER (OUTPATIENT)
Dept: MAMMOGRAPHY | Facility: HOSPITAL | Age: 80
Discharge: HOME | End: 2025-05-27
Admitting: NURSE PRACTITIONER
Payer: MEDICARE

## 2025-05-27 DIAGNOSIS — R92.8 ABNORMALITY OF LEFT BREAST ON SCREENING MAMMOGRAM: ICD-10-CM

## 2025-05-27 PROCEDURE — 77065 DX MAMMO INCL CAD UNI: CPT

## 2025-05-27 PROCEDURE — G0279 TOMOSYNTHESIS, MAMMO: HCPCS

## 2025-05-28 ENCOUNTER — TELEPHONE (OUTPATIENT)
Dept: ONCOLOGY | Facility: CLINIC | Age: 80
End: 2025-05-28
Payer: MEDICARE

## 2025-05-28 DIAGNOSIS — D05.12 DUCTAL CARCINOMA IN SITU (DCIS) OF LEFT BREAST: ICD-10-CM

## 2025-05-28 DIAGNOSIS — R92.1 BREAST CALCIFICATIONS ON MAMMOGRAM: Primary | ICD-10-CM

## 2025-05-28 NOTE — TELEPHONE ENCOUNTER
Called pt and informed her of mammogram results. She v/u. Orders placed and message sent to scheduling.     ----- Message from Isabela Zayas sent at 5/27/2025  4:33 PM EDT -----  Regarding: FW: matt Johnson - could you please place this order and let patient know (if not already aware from radiology) that she needs additional evaluation. Can put order under [facility] or I'm happy to sign and call her with results. Thanks.  ----- Message -----  From: Renetta Chavez MD  Sent: 5/27/2025   2:12 PM EDT  To: Isabela Zayas, MOISES; Kayy Glez #  Subject: matt santizo                                          Good afternoon,Suspicious 1.2 cm grouped calcifications in the middle left breast. Recommend further evaluation with stereotactic core needle biopsy.Thanks

## 2025-06-03 NOTE — PROGRESS NOTES
06/10/25 0002   Pre-Procedure Phone Call   Procedure Time Verified Yes   Arrival Time 1130   Procedure Location Verified Yes   Medical History Reviewed No   NPO Status Reinforced No     Left voicemail message for Olinda. Informed her to arrive one hour prior to biopsy, can eat and drink and drive self to and from, advised re type of clothing and timeframe for results to come back. Mamm nurse provided our contact number should she need to reach us.

## 2025-06-10 ENCOUNTER — APPOINTMENT (OUTPATIENT)
Dept: CARDIOLOGY | Facility: HOSPITAL | Age: 80
End: 2025-06-10
Payer: MEDICARE

## 2025-06-10 ENCOUNTER — ANCILLARY PROCEDURE (OUTPATIENT)
Dept: LAB | Facility: HOSPITAL | Age: 80
End: 2025-06-10
Payer: MEDICARE

## 2025-06-10 ENCOUNTER — HOSPITAL ENCOUNTER (OUTPATIENT)
Dept: MAMMOGRAPHY | Facility: HOSPITAL | Age: 80
Discharge: HOME | End: 2025-06-10
Payer: MEDICARE

## 2025-06-10 ENCOUNTER — TRANSCRIBE ORDERS (OUTPATIENT)
Dept: LAB | Facility: HOSPITAL | Age: 80
End: 2025-06-10
Payer: MEDICARE

## 2025-06-10 VITALS
HEART RATE: 76 BPM | DIASTOLIC BLOOD PRESSURE: 84 MMHG | TEMPERATURE: 97.3 F | OXYGEN SATURATION: 100 % | RESPIRATION RATE: 17 BRPM | SYSTOLIC BLOOD PRESSURE: 153 MMHG

## 2025-06-10 DIAGNOSIS — R92.1 BREAST CALCIFICATIONS ON MAMMOGRAM: ICD-10-CM

## 2025-06-10 DIAGNOSIS — D05.12 DUCTAL CARCINOMA IN SITU (DCIS) OF LEFT BREAST: ICD-10-CM

## 2025-06-10 DIAGNOSIS — R92.1 BREAST CALCIFICATIONS ON MAMMOGRAM: Primary | ICD-10-CM

## 2025-06-10 PROCEDURE — 88305 TISSUE EXAM BY PATHOLOGIST: CPT | Performed by: INTERNAL MEDICINE

## 2025-06-10 PROCEDURE — A4648 IMPLANTABLE TISSUE MARKER: HCPCS

## 2025-06-10 PROCEDURE — 76098 X-RAY EXAM SURGICAL SPECIMEN: CPT

## 2025-06-10 PROCEDURE — 25010000002 LIDOCAINE-EPINEPHRINE (PF) 1 %-1:200000 SOLUTION: Performed by: INTERNAL MEDICINE

## 2025-06-10 PROCEDURE — 25010000002 LIDOCAINE 1 % SOLUTION: Performed by: INTERNAL MEDICINE

## 2025-06-10 RX ORDER — LIDOCAINE HYDROCHLORIDE 10 MG/ML
2 INJECTION, SOLUTION INFILTRATION; PERINEURAL ONCE
Status: COMPLETED | OUTPATIENT
Start: 2025-06-10 | End: 2025-06-10

## 2025-06-10 RX ADMIN — Medication 2 ML: at 12:48

## 2025-06-10 RX ADMIN — LIDOCAINE HYDROCHLORIDE 20 ML: 10; .005 INJECTION, SOLUTION EPIDURAL; INFILTRATION; INTRACAUDAL; PERINEURAL at 12:49

## 2025-06-10 NOTE — NURSING NOTE
Dr. Bazzi in to speak with patient. Patient consented and site marked. All patient questions addressed.

## 2025-06-10 NOTE — NURSING NOTE
Biopsy sites to Left breast assessed. Scant amount of dried blood visible under the glue. Dermabond dry and intact. No firmness or swelling noted at or around biopsy site.  Ice pack with protective covering applied to biopsy site. Discharge instructions discussed with patient. Patient verbalized understanding. No distress noted. To home via private vehicle, accompanied by her .

## 2025-06-10 NOTE — NURSING NOTE
Vital Signs Stable. Patient denies pain. Medical/surgical history and medications reviewed. No distress noted. Procedural education completed with patient's questions addressed.

## 2025-06-11 ENCOUNTER — TELEPHONE (OUTPATIENT)
Dept: MAMMOGRAPHY | Facility: HOSPITAL | Age: 80
End: 2025-06-11
Payer: MEDICARE

## 2025-06-11 NOTE — TELEPHONE ENCOUNTER
Post bx call: This RN placed call to pt to follow up with her following her stereotactic guided biopsy of the left breast on 6/10/25. Pt reports she is doing well other than expected soreness and has no questions or concerns.

## 2025-06-16 ENCOUNTER — RESULTS FOLLOW-UP (OUTPATIENT)
Dept: MAMMOGRAPHY | Facility: HOSPITAL | Age: 80
End: 2025-06-16
Payer: MEDICARE

## 2025-06-18 ENCOUNTER — HOSPITAL ENCOUNTER (OUTPATIENT)
Dept: CT IMAGING | Facility: HOSPITAL | Age: 80
Discharge: HOME | End: 2025-06-18
Admitting: INTERNAL MEDICINE
Payer: MEDICARE

## 2025-06-18 DIAGNOSIS — J98.4 RESTRICTIVE LUNG DISEASE: ICD-10-CM

## 2025-06-18 PROCEDURE — 71250 CT THORAX DX C-: CPT

## 2025-06-27 ENCOUNTER — HOSPITAL ENCOUNTER (OUTPATIENT)
Dept: CARDIOLOGY | Facility: HOSPITAL | Age: 80
Discharge: HOME | End: 2025-06-27
Payer: MEDICARE

## 2025-06-27 VITALS
WEIGHT: 167 LBS | HEIGHT: 62 IN | BODY MASS INDEX: 30.73 KG/M2 | DIASTOLIC BLOOD PRESSURE: 74 MMHG | HEART RATE: 101 BPM | SYSTOLIC BLOOD PRESSURE: 157 MMHG

## 2025-06-27 DIAGNOSIS — R01.1 SYSTOLIC MURMUR: ICD-10-CM

## 2025-06-27 LAB
AORTIC DIMENSIONLESS INDEX: 0.76 (DI)
ASCENDING AORTA: 3.1 CM
AV MEAN PRESS GRAD SYS DOP V1V2: 5 MMHG
AV VMAX SYS DOP: 142.6 CM/SEC
BH CV ECHO MEAS - ACS: 1.52 CM
BH CV ECHO MEAS - AO MAX PG: 8.1 MMHG
BH CV ECHO MEAS - AO ROOT AREA (BSA CORRECTED): 1.6 CM2
BH CV ECHO MEAS - AO ROOT DIAM: 2.9 CM
BH CV ECHO MEAS - AO V2 VTI: 28 CM
BH CV ECHO MEAS - AVA(I,D): 2.3 CM2
BH CV ECHO MEAS - EDV(CUBED): 51.4 ML
BH CV ECHO MEAS - EDV(MOD-SP2): 77 ML
BH CV ECHO MEAS - EDV(MOD-SP4): 58 ML
BH CV ECHO MEAS - EF(MOD-SP2): 74 %
BH CV ECHO MEAS - EF(MOD-SP4): 70.7 %
BH CV ECHO MEAS - ESV(MOD-SP2): 20 ML
BH CV ECHO MEAS - ESV(MOD-SP4): 17 ML
BH CV ECHO MEAS - FS: 49.1 %
BH CV ECHO MEAS - IVS/LVPW: 0.79 CM
BH CV ECHO MEAS - IVSD: 0.71 CM
BH CV ECHO MEAS - LAT PEAK E' VEL: 7.3 CM/SEC
BH CV ECHO MEAS - LV DIASTOLIC VOL/BSA (35-75): 32.8 CM2
BH CV ECHO MEAS - LV MASS(C)D: 82.5 GRAMS
BH CV ECHO MEAS - LV MAX PG: 4.4 MMHG
BH CV ECHO MEAS - LV MEAN PG: 2.33 MMHG
BH CV ECHO MEAS - LV SYSTOLIC VOL/BSA (12-30): 9.6 CM2
BH CV ECHO MEAS - LV V1 MAX: 104.3 CM/SEC
BH CV ECHO MEAS - LV V1 VTI: 21.3 CM
BH CV ECHO MEAS - LVIDD: 3.7 CM
BH CV ECHO MEAS - LVIDS: 1.89 CM
BH CV ECHO MEAS - LVOT AREA: 3 CM2
BH CV ECHO MEAS - LVOT DIAM: 1.97 CM
BH CV ECHO MEAS - LVPWD: 0.89 CM
BH CV ECHO MEAS - MED PEAK E' VEL: 6.4 CM/SEC
BH CV ECHO MEAS - MV A DUR: 0.14 SEC
BH CV ECHO MEAS - MV A MAX VEL: 117.9 CM/SEC
BH CV ECHO MEAS - MV DEC SLOPE: 431.9 CM/SEC2
BH CV ECHO MEAS - MV DEC TIME: 0.16 SEC
BH CV ECHO MEAS - MV E MAX VEL: 61.8 CM/SEC
BH CV ECHO MEAS - MV E/A: 0.52
BH CV ECHO MEAS - MV MAX PG: 4.9 MMHG
BH CV ECHO MEAS - MV MEAN PG: 1.7 MMHG
BH CV ECHO MEAS - MV P1/2T: 47.6 MSEC
BH CV ECHO MEAS - MV V2 VTI: 22.3 CM
BH CV ECHO MEAS - MVA(P1/2T): 4.6 CM2
BH CV ECHO MEAS - MVA(VTI): 2.9 CM2
BH CV ECHO MEAS - PA V2 MAX: 91.3 CM/SEC
BH CV ECHO MEAS - PULM A REVS DUR: 0.11 SEC
BH CV ECHO MEAS - PULM A REVS VEL: 39.4 CM/SEC
BH CV ECHO MEAS - PULM DIAS VEL: 24.2 CM/SEC
BH CV ECHO MEAS - PULM S/D: 1.29
BH CV ECHO MEAS - PULM SYS VEL: 31.3 CM/SEC
BH CV ECHO MEAS - QP/QS: 0.59
BH CV ECHO MEAS - RAP SYSTOLE: 3 MMHG
BH CV ECHO MEAS - RV MAX PG: 1.64 MMHG
BH CV ECHO MEAS - RV V1 MAX: 63.9 CM/SEC
BH CV ECHO MEAS - RV V1 VTI: 11.4 CM
BH CV ECHO MEAS - RVOT DIAM: 2.06 CM
BH CV ECHO MEAS - SV(LVOT): 64.5 ML
BH CV ECHO MEAS - SV(MOD-SP2): 57 ML
BH CV ECHO MEAS - SV(MOD-SP4): 41 ML
BH CV ECHO MEAS - SV(RVOT): 38.1 ML
BH CV ECHO MEAS - SVI(LVOT): 36.4 ML/M2
BH CV ECHO MEAS - SVI(MOD-SP2): 32.2 ML/M2
BH CV ECHO MEAS - SVI(MOD-SP4): 23.2 ML/M2
BH CV ECHO MEAS - TAPSE (>1.6): 1.43 CM
BH CV ECHO MEASUREMENTS AVERAGE E/E' RATIO: 9.02
BH CV XLRA - RV BASE: 2.7 CM
BH CV XLRA - RV LENGTH: 6 CM
BH CV XLRA - RV MID: 1.77 CM
BH CV XLRA - TDI S': 7.9 CM/SEC
LEFT ATRIUM VOLUME INDEX: 16.4 ML/M2
LV EF BIPLANE MOD: 71.6 %
SINUS: 2.9 CM
STJ: 2.4 CM

## 2025-06-27 PROCEDURE — 25510000001 PERFLUTREN 6.52 MG/ML SUSPENSION 2 ML VIAL: Performed by: INTERNAL MEDICINE

## 2025-06-27 PROCEDURE — 93306 TTE W/DOPPLER COMPLETE: CPT

## 2025-06-27 RX ADMIN — PERFLUTREN 1 ML: 6.52 INJECTION, SUSPENSION INTRAVENOUS at 14:47

## 2025-08-04 ENCOUNTER — APPOINTMENT (OUTPATIENT)
Dept: GENERAL RADIOLOGY | Facility: HOSPITAL | Age: 80
End: 2025-08-04
Payer: MEDICARE

## 2025-08-04 ENCOUNTER — APPOINTMENT (OUTPATIENT)
Dept: CARDIOLOGY | Facility: HOSPITAL | Age: 80
End: 2025-08-04
Payer: MEDICARE

## 2025-08-04 ENCOUNTER — HOSPITAL ENCOUNTER (EMERGENCY)
Facility: HOSPITAL | Age: 80
Discharge: HOME | End: 2025-08-04
Attending: EMERGENCY MEDICINE | Admitting: EMERGENCY MEDICINE
Payer: MEDICARE

## 2025-08-04 VITALS
TEMPERATURE: 97.8 F | HEIGHT: 62 IN | BODY MASS INDEX: 30.54 KG/M2 | HEART RATE: 66 BPM | SYSTOLIC BLOOD PRESSURE: 138 MMHG | DIASTOLIC BLOOD PRESSURE: 74 MMHG | OXYGEN SATURATION: 100 % | RESPIRATION RATE: 16 BRPM

## 2025-08-04 DIAGNOSIS — M54.31 SCIATICA OF RIGHT SIDE: Primary | ICD-10-CM

## 2025-08-04 LAB
BH CV LOWER VASCULAR LEFT COMMON FEMORAL AUGMENT: NORMAL
BH CV LOWER VASCULAR LEFT COMMON FEMORAL COMPETENT: NORMAL
BH CV LOWER VASCULAR LEFT COMMON FEMORAL COMPRESS: NORMAL
BH CV LOWER VASCULAR LEFT COMMON FEMORAL PHASIC: NORMAL
BH CV LOWER VASCULAR LEFT COMMON FEMORAL SPONT: NORMAL
BH CV LOWER VASCULAR RIGHT COMMON FEMORAL AUGMENT: NORMAL
BH CV LOWER VASCULAR RIGHT COMMON FEMORAL COMPETENT: NORMAL
BH CV LOWER VASCULAR RIGHT COMMON FEMORAL COMPRESS: NORMAL
BH CV LOWER VASCULAR RIGHT COMMON FEMORAL PHASIC: NORMAL
BH CV LOWER VASCULAR RIGHT COMMON FEMORAL SPONT: NORMAL
BH CV LOWER VASCULAR RIGHT DISTAL FEMORAL COMPRESS: NORMAL
BH CV LOWER VASCULAR RIGHT GASTRONEMIUS COMPRESS: NORMAL
BH CV LOWER VASCULAR RIGHT GREATER SAPH AK COMPRESS: NORMAL
BH CV LOWER VASCULAR RIGHT GREATER SAPH BK COMPRESS: NORMAL
BH CV LOWER VASCULAR RIGHT LESSER SAPH COMPRESS: NORMAL
BH CV LOWER VASCULAR RIGHT MID FEMORAL AUGMENT: NORMAL
BH CV LOWER VASCULAR RIGHT MID FEMORAL COMPETENT: NORMAL
BH CV LOWER VASCULAR RIGHT MID FEMORAL COMPRESS: NORMAL
BH CV LOWER VASCULAR RIGHT MID FEMORAL PHASIC: NORMAL
BH CV LOWER VASCULAR RIGHT MID FEMORAL SPONT: NORMAL
BH CV LOWER VASCULAR RIGHT PERONEAL COMPRESS: NORMAL
BH CV LOWER VASCULAR RIGHT POPLITEAL AUGMENT: NORMAL
BH CV LOWER VASCULAR RIGHT POPLITEAL COMPETENT: NORMAL
BH CV LOWER VASCULAR RIGHT POPLITEAL COMPRESS: NORMAL
BH CV LOWER VASCULAR RIGHT POPLITEAL PHASIC: NORMAL
BH CV LOWER VASCULAR RIGHT POPLITEAL SPONT: NORMAL
BH CV LOWER VASCULAR RIGHT POSTERIOR TIBIAL COMPRESS: NORMAL
BH CV LOWER VASCULAR RIGHT PROFUNDA FEMORAL COMPRESS: NORMAL
BH CV LOWER VASCULAR RIGHT PROXIMAL FEMORAL COMPRESS: NORMAL
BH CV LOWER VASCULAR RIGHT SAPHENOFEMORAL JUNCTION COMPRESS: NORMAL
BH CV VAS PRELIMINARY FINDINGS SCRIPTING: 1

## 2025-08-04 PROCEDURE — 72110 X-RAY EXAM L-2 SPINE 4/>VWS: CPT

## 2025-08-04 PROCEDURE — 93971 EXTREMITY STUDY: CPT | Performed by: SURGERY

## 2025-08-04 PROCEDURE — 73610 X-RAY EXAM OF ANKLE: CPT

## 2025-08-04 PROCEDURE — 99284 EMERGENCY DEPT VISIT MOD MDM: CPT

## 2025-08-04 PROCEDURE — 73502 X-RAY EXAM HIP UNI 2-3 VIEWS: CPT

## 2025-08-04 PROCEDURE — 93971 EXTREMITY STUDY: CPT

## 2025-08-04 RX ORDER — HYDROCODONE BITARTRATE AND ACETAMINOPHEN 5; 325 MG/1; MG/1
1 TABLET ORAL ONCE
Refills: 0 | Status: COMPLETED | OUTPATIENT
Start: 2025-08-04 | End: 2025-08-04

## 2025-08-04 RX ORDER — HYDROCODONE BITARTRATE AND ACETAMINOPHEN 5; 325 MG/1; MG/1
1 TABLET ORAL EVERY 6 HOURS PRN
Qty: 12 TABLET | Refills: 0 | Status: SHIPPED | OUTPATIENT
Start: 2025-08-04

## 2025-08-04 RX ORDER — CYCLOBENZAPRINE HCL 5 MG
5 TABLET ORAL 3 TIMES DAILY PRN
Qty: 20 TABLET | Refills: 0 | Status: SHIPPED | OUTPATIENT
Start: 2025-08-04

## 2025-08-04 RX ADMIN — HYDROCODONE BITARTRATE AND ACETAMINOPHEN 1 TABLET: 5; 325 TABLET ORAL at 11:34

## 2025-08-19 ENCOUNTER — TRANSCRIBE ORDERS (OUTPATIENT)
Dept: ADMINISTRATIVE | Facility: HOSPITAL | Age: 80
End: 2025-08-19
Payer: MEDICARE

## 2025-08-19 DIAGNOSIS — M79.606 PAIN OF LOWER EXTREMITY, UNSPECIFIED LATERALITY: Primary | ICD-10-CM

## 2025-08-20 ENCOUNTER — HOSPITAL ENCOUNTER (OUTPATIENT)
Dept: MRI IMAGING | Facility: HOSPITAL | Age: 80
Discharge: HOME | End: 2025-08-20
Admitting: INTERNAL MEDICINE
Payer: MEDICARE

## 2025-08-20 DIAGNOSIS — M79.606 PAIN OF LOWER EXTREMITY, UNSPECIFIED LATERALITY: ICD-10-CM

## 2025-08-20 PROCEDURE — 72148 MRI LUMBAR SPINE W/O DYE: CPT

## 2025-08-20 PROCEDURE — 76014 MR SFTY IMPLT&/FB ASMT STF 1: CPT

## 2025-08-26 ENCOUNTER — HOSPITAL ENCOUNTER (OUTPATIENT)
Dept: GENERAL RADIOLOGY | Facility: HOSPITAL | Age: 80
Discharge: HOME | End: 2025-08-26
Payer: MEDICARE

## 2025-08-26 ENCOUNTER — ANESTHESIA (OUTPATIENT)
Dept: PAIN MEDICINE | Facility: HOSPITAL | Age: 80
End: 2025-08-26
Payer: MEDICARE

## 2025-08-26 ENCOUNTER — HOSPITAL ENCOUNTER (OUTPATIENT)
Dept: PAIN MEDICINE | Facility: HOSPITAL | Age: 80
Discharge: HOME | End: 2025-08-26
Payer: MEDICARE

## 2025-08-26 ENCOUNTER — ANESTHESIA EVENT (OUTPATIENT)
Dept: PAIN MEDICINE | Facility: HOSPITAL | Age: 80
End: 2025-08-26
Payer: MEDICARE

## 2025-08-26 VITALS
DIASTOLIC BLOOD PRESSURE: 76 MMHG | TEMPERATURE: 97.3 F | RESPIRATION RATE: 16 BRPM | WEIGHT: 163 LBS | SYSTOLIC BLOOD PRESSURE: 162 MMHG | HEIGHT: 62 IN | BODY MASS INDEX: 30 KG/M2 | HEART RATE: 74 BPM | OXYGEN SATURATION: 97 %

## 2025-08-26 DIAGNOSIS — R52 PAIN: ICD-10-CM

## 2025-08-26 DIAGNOSIS — M54.16 LUMBAR RADICULOPATHY: Primary | ICD-10-CM

## 2025-08-26 PROCEDURE — 25010000002 METHYLPREDNISOLONE PER 80 MG: Performed by: ANESTHESIOLOGY

## 2025-08-26 PROCEDURE — 25010000002 FENTANYL CITRATE (PF) 50 MCG/ML SOLUTION: Performed by: ANESTHESIOLOGY

## 2025-08-26 PROCEDURE — 25510000001 IOPAMIDOL 41 % SOLUTION: Performed by: ANESTHESIOLOGY

## 2025-08-26 PROCEDURE — 77003 FLUOROGUIDE FOR SPINE INJECT: CPT

## 2025-08-26 RX ORDER — SODIUM CHLORIDE 0.9 % (FLUSH) 0.9 %
10 SYRINGE (ML) INJECTION AS NEEDED
Status: DISCONTINUED | OUTPATIENT
Start: 2025-08-26 | End: 2025-08-27 | Stop reason: HOSPADM

## 2025-08-26 RX ORDER — IOPAMIDOL 408 MG/ML
10 INJECTION, SOLUTION INTRATHECAL
Status: COMPLETED | OUTPATIENT
Start: 2025-08-26 | End: 2025-08-26

## 2025-08-26 RX ORDER — LIDOCAINE HYDROCHLORIDE 10 MG/ML
1 INJECTION, SOLUTION INFILTRATION; PERINEURAL ONCE
Status: DISCONTINUED | OUTPATIENT
Start: 2025-08-26 | End: 2025-08-27 | Stop reason: HOSPADM

## 2025-08-26 RX ORDER — METHYLPREDNISOLONE ACETATE 80 MG/ML
80 INJECTION, SUSPENSION INTRA-ARTICULAR; INTRALESIONAL; INTRAMUSCULAR; SOFT TISSUE ONCE
Status: COMPLETED | OUTPATIENT
Start: 2025-08-26 | End: 2025-08-26

## 2025-08-26 RX ORDER — MIDAZOLAM HYDROCHLORIDE 1 MG/ML
1 INJECTION, SOLUTION INTRAMUSCULAR; INTRAVENOUS ONCE AS NEEDED
Status: DISCONTINUED | OUTPATIENT
Start: 2025-08-26 | End: 2025-08-27 | Stop reason: HOSPADM

## 2025-08-26 RX ORDER — SODIUM CHLORIDE 9 MG/ML
40 INJECTION, SOLUTION INTRAVENOUS AS NEEDED
Status: DISCONTINUED | OUTPATIENT
Start: 2025-08-26 | End: 2025-08-27 | Stop reason: HOSPADM

## 2025-08-26 RX ORDER — SODIUM CHLORIDE 0.9 % (FLUSH) 0.9 %
10 SYRINGE (ML) INJECTION EVERY 12 HOURS SCHEDULED
Status: DISCONTINUED | OUTPATIENT
Start: 2025-08-26 | End: 2025-08-27 | Stop reason: HOSPADM

## 2025-08-26 RX ORDER — LIDOCAINE HYDROCHLORIDE 10 MG/ML
0.5 INJECTION, SOLUTION INFILTRATION; PERINEURAL ONCE AS NEEDED
Status: DISCONTINUED | OUTPATIENT
Start: 2025-08-26 | End: 2025-08-27 | Stop reason: HOSPADM

## 2025-08-26 RX ORDER — FENTANYL CITRATE 50 UG/ML
50 INJECTION, SOLUTION INTRAMUSCULAR; INTRAVENOUS AS NEEDED
Refills: 0 | Status: DISCONTINUED | OUTPATIENT
Start: 2025-08-26 | End: 2025-08-27 | Stop reason: HOSPADM

## 2025-08-26 RX ADMIN — FENTANYL CITRATE 50 MCG: 50 INJECTION, SOLUTION INTRAMUSCULAR; INTRAVENOUS at 12:23

## 2025-08-26 RX ADMIN — IOPAMIDOL 10 ML: 408 INJECTION, SOLUTION INTRATHECAL at 12:24

## 2025-08-26 RX ADMIN — METHYLPREDNISOLONE ACETATE 80 MG: 80 INJECTION, SUSPENSION INTRA-ARTICULAR; INTRALESIONAL; INTRAMUSCULAR; SOFT TISSUE at 12:24

## 2025-08-27 ENCOUNTER — TRANSCRIBE ORDERS (OUTPATIENT)
Dept: PHYSICAL THERAPY | Facility: CLINIC | Age: 80
End: 2025-08-27
Payer: MEDICARE

## 2025-08-27 DIAGNOSIS — M54.16 RADICULOPATHY, LUMBAR REGION: Primary | ICD-10-CM

## (undated) DEVICE — SNAR POLYP CAPTIVATOR HEX 27MM 240CM

## (undated) DEVICE — GLV SURG BIOGEL LTX PF 8

## (undated) DEVICE — Device: Brand: DEFENDO AIR/WATER/SUCTION AND BIOPSY VALVE

## (undated) DEVICE — KT ORCA ORCAPOD DISP STRL

## (undated) DEVICE — TUBING, SUCTION, 1/4" X 10', STRAIGHT: Brand: MEDLINE

## (undated) DEVICE — THE SINGLE USE ETRAP – POLYP TRAP IS USED FOR SUCTION RETRIEVAL OF ENDOSCOPICALLY REMOVED POLYPS.: Brand: ETRAP

## (undated) DEVICE — TRAP FLD MINIVAC MEGADYNE 100ML

## (undated) DEVICE — SEAL HYSTERSCOPE/OUTFLOW CHANNEL MYOSURE

## (undated) DEVICE — THE CARR-LOCKE INJECTION NEEDLE IS A SINGLE USE, DISPOSABLE, FLEXIBLE SHEATH INJECTION NEEDLE USED FOR THE INJECTION OF VARIOUS TYPES OF MEDIA THROUGH FLEXIBLE ENDOSCOPES.

## (undated) DEVICE — CANN O2 ETCO2 FITS ALL CONN CO2 SMPL A/ 7IN DISP LF

## (undated) DEVICE — CANN NASL CO2 TRULINK W/O2 A/

## (undated) DEVICE — LN SMPL CO2 SHTRM SD STREAM W/M LUER

## (undated) DEVICE — NDL HYPO ECLPS SFTY 22G 1 1/2IN

## (undated) DEVICE — ADAPT CLN BIOGUARD AIR/H2O DISP

## (undated) DEVICE — THE TORRENT IRRIGATION SCOPE CONNECTOR IS USED WITH THE TORRENT IRRIGATION TUBING TO PROVIDE IRRIGATION FLUIDS SUCH AS STERILE WATER DURING GASTROINTESTINAL ENDOSCOPIC PROCEDURES WHEN USED IN CONJUNCTION WITH AN IRRIGATION PUMP (OR ELECTROSURGICAL UNIT).: Brand: TORRENT

## (undated) DEVICE — GLV SURG BIOGEL LTX PF 6 1/2

## (undated) DEVICE — PATIENT RETURN ELECTRODE, SINGLE-USE, CONTACT QUALITY MONITORING, ADULT, WITH 9FT CORD, FOR PATIENTS WEIGING OVER 33LBS. (15KG): Brand: MEGADYNE

## (undated) DEVICE — PK CHST BRST 40

## (undated) DEVICE — SENSR O2 OXIMAX FNGR A/ 18IN NONSTR

## (undated) DEVICE — SUT VIC 3/0 SH 27IN J416H

## (undated) DEVICE — SUT MNCRYL PLS ANTIB UD 4/0 PS2 18IN

## (undated) DEVICE — TBG PENCL TELESCP MEGADYNE SMOKE EVAC 10FT

## (undated) DEVICE — SINGLE-USE BIOPSY FORCEPS: Brand: RADIAL JAW 4

## (undated) DEVICE — OSC HYSTEROSCOPY: Brand: MEDLINE INDUSTRIES, INC.

## (undated) DEVICE — ADHS SKIN SURG TISS VISC PREMIERPRO EXOFIN HI/VISC FAST/DRY

## (undated) DEVICE — SNAR POLYP SENSATION STDOVL 27 240 BX40

## (undated) DEVICE — BITEBLOCK OMNI BLOC

## (undated) DEVICE — FRCP BX RADJAW4 NDL 2.8 240CM LG OG BX40

## (undated) DEVICE — APPL CHLORAPREP HI/LITE 26ML ORNG

## (undated) DEVICE — SUT SILK 2/0 FS BLK 18IN 685G

## (undated) DEVICE — DEV TISS REMOV MYOSURE/LITE HYSTEROSCP

## (undated) DEVICE — MSK PROC CURAPLEX O2 2/ADAPT 7FT